# Patient Record
Sex: FEMALE | Race: WHITE | Employment: OTHER | URBAN - NONMETROPOLITAN AREA
[De-identification: names, ages, dates, MRNs, and addresses within clinical notes are randomized per-mention and may not be internally consistent; named-entity substitution may affect disease eponyms.]

---

## 2017-01-10 ENCOUNTER — APPOINTMENT (OUTPATIENT)
Dept: LAB | Age: 62
End: 2017-01-10
Attending: INTERNAL MEDICINE
Payer: COMMERCIAL

## 2017-01-10 DIAGNOSIS — I10 ESSENTIAL HYPERTENSION WITH GOAL BLOOD PRESSURE LESS THAN 140/90: ICD-10-CM

## 2017-01-10 DIAGNOSIS — E11.9 TYPE 2 DIABETES MELLITUS WITHOUT COMPLICATION, WITHOUT LONG-TERM CURRENT USE OF INSULIN (HCC): ICD-10-CM

## 2017-01-10 LAB
CREAT UR-SCNC: 206 MG/DL
MICROALBUMIN UR-MCNC: 7.98 MG/DL
MICROALBUMIN/CREAT 24H UR-RTO: 38.7 UG/MG (ref ?–30)

## 2017-01-10 PROCEDURE — 36415 COLL VENOUS BLD VENIPUNCTURE: CPT | Performed by: INTERNAL MEDICINE

## 2017-01-10 PROCEDURE — 83036 HEMOGLOBIN GLYCOSYLATED A1C: CPT | Performed by: INTERNAL MEDICINE

## 2017-01-10 PROCEDURE — 80053 COMPREHEN METABOLIC PANEL: CPT | Performed by: INTERNAL MEDICINE

## 2017-01-10 PROCEDURE — 82570 ASSAY OF URINE CREATININE: CPT

## 2017-01-10 PROCEDURE — 85025 COMPLETE CBC W/AUTO DIFF WBC: CPT | Performed by: INTERNAL MEDICINE

## 2017-01-10 PROCEDURE — 80061 LIPID PANEL: CPT | Performed by: INTERNAL MEDICINE

## 2017-01-10 PROCEDURE — 82043 UR ALBUMIN QUANTITATIVE: CPT

## 2017-01-11 ENCOUNTER — TELEPHONE (OUTPATIENT)
Dept: FAMILY MEDICINE CLINIC | Facility: CLINIC | Age: 62
End: 2017-01-11

## 2017-01-17 ENCOUNTER — OFFICE VISIT (OUTPATIENT)
Dept: FAMILY MEDICINE CLINIC | Facility: CLINIC | Age: 62
End: 2017-01-17

## 2017-01-17 VITALS
DIASTOLIC BLOOD PRESSURE: 70 MMHG | SYSTOLIC BLOOD PRESSURE: 120 MMHG | WEIGHT: 232 LBS | HEIGHT: 61 IN | BODY MASS INDEX: 43.8 KG/M2 | TEMPERATURE: 98 F | RESPIRATION RATE: 16 BRPM

## 2017-01-17 DIAGNOSIS — E11.9 TYPE 2 DIABETES MELLITUS WITHOUT COMPLICATION, WITHOUT LONG-TERM CURRENT USE OF INSULIN (HCC): Primary | ICD-10-CM

## 2017-01-17 DIAGNOSIS — I10 ESSENTIAL HYPERTENSION WITH GOAL BLOOD PRESSURE LESS THAN 140/90: ICD-10-CM

## 2017-01-17 DIAGNOSIS — E78.00 HYPERCHOLESTEREMIA: ICD-10-CM

## 2017-01-17 PROCEDURE — 99214 OFFICE O/P EST MOD 30 MIN: CPT | Performed by: INTERNAL MEDICINE

## 2017-01-17 RX ORDER — SIMVASTATIN 20 MG
20 TABLET ORAL NIGHTLY
Qty: 90 TABLET | Refills: 3 | Status: SHIPPED | OUTPATIENT
Start: 2017-01-17 | End: 2017-02-10

## 2017-01-17 RX ORDER — LISINOPRIL 20 MG/1
20 TABLET ORAL DAILY
Qty: 90 TABLET | Refills: 3 | Status: SHIPPED | OUTPATIENT
Start: 2017-01-17 | End: 2018-01-19

## 2017-01-17 RX ORDER — METFORMIN HYDROCHLORIDE 500 MG/1
500 TABLET, EXTENDED RELEASE ORAL DAILY
Qty: 90 TABLET | Refills: 3 | Status: SHIPPED | OUTPATIENT
Start: 2017-01-17 | End: 2017-02-10

## 2017-01-17 RX ORDER — BISOPROLOL FUMARATE AND HYDROCHLOROTHIAZIDE 6.25; 1 MG/1; MG/1
2 TABLET ORAL DAILY
Qty: 180 TABLET | Refills: 3 | Status: SHIPPED | OUTPATIENT
Start: 2017-01-17 | End: 2018-01-19

## 2017-01-17 NOTE — PROGRESS NOTES
HPI:   Laura Jane is a 64year old female who presents for recheck of her diabetes. Patient’s FBS have been 140. Last visit with ophthalmologist OVERDUE. Pt has been checking her feet on a regular basis. Pt denies any tingling of the feet.  Pt de CHOLECYSTECTOMY        Social History:   Smoking Status: Never Smoker                      Alcohol Use: Yes           0.0 oz/week       0 Standard drinks or equivalent per week       Comment: occ    Exercise: none.   Diet: doesn't watch     REVIEW OF SYS

## 2017-02-10 ENCOUNTER — TELEPHONE (OUTPATIENT)
Dept: FAMILY MEDICINE CLINIC | Facility: CLINIC | Age: 62
End: 2017-02-10

## 2017-02-10 DIAGNOSIS — I10 ESSENTIAL HYPERTENSION WITH GOAL BLOOD PRESSURE LESS THAN 140/90: ICD-10-CM

## 2017-02-10 DIAGNOSIS — E78.00 HYPERCHOLESTEREMIA: ICD-10-CM

## 2017-02-10 DIAGNOSIS — E11.9 TYPE 2 DIABETES MELLITUS WITHOUT COMPLICATION, WITHOUT LONG-TERM CURRENT USE OF INSULIN (HCC): Primary | ICD-10-CM

## 2017-02-10 RX ORDER — METFORMIN HYDROCHLORIDE 500 MG/1
500 TABLET, EXTENDED RELEASE ORAL DAILY
Qty: 90 TABLET | Refills: 3 | Status: SHIPPED | OUTPATIENT
Start: 2017-02-10 | End: 2017-04-13

## 2017-02-10 RX ORDER — SIMVASTATIN 20 MG
20 TABLET ORAL NIGHTLY
Qty: 90 TABLET | Refills: 3 | Status: SHIPPED | OUTPATIENT
Start: 2017-02-10 | End: 2017-02-10

## 2017-02-10 RX ORDER — METFORMIN HYDROCHLORIDE 500 MG/1
500 TABLET, EXTENDED RELEASE ORAL DAILY
Qty: 90 TABLET | Refills: 3 | Status: SHIPPED | OUTPATIENT
Start: 2017-02-10 | End: 2017-02-10

## 2017-02-10 RX ORDER — SIMVASTATIN 20 MG
20 TABLET ORAL NIGHTLY
Qty: 90 TABLET | Refills: 3 | Status: SHIPPED | OUTPATIENT
Start: 2017-02-10 | End: 2017-07-27

## 2017-02-17 ENCOUNTER — PATIENT OUTREACH (OUTPATIENT)
Dept: FAMILY MEDICINE CLINIC | Facility: CLINIC | Age: 62
End: 2017-02-17

## 2017-04-11 ENCOUNTER — APPOINTMENT (OUTPATIENT)
Dept: LAB | Age: 62
End: 2017-04-11
Attending: INTERNAL MEDICINE
Payer: COMMERCIAL

## 2017-04-11 DIAGNOSIS — E11.9 TYPE 2 DIABETES MELLITUS WITHOUT COMPLICATION, WITHOUT LONG-TERM CURRENT USE OF INSULIN (HCC): ICD-10-CM

## 2017-04-11 PROCEDURE — 80053 COMPREHEN METABOLIC PANEL: CPT | Performed by: INTERNAL MEDICINE

## 2017-04-11 PROCEDURE — 36415 COLL VENOUS BLD VENIPUNCTURE: CPT | Performed by: INTERNAL MEDICINE

## 2017-04-11 PROCEDURE — 83036 HEMOGLOBIN GLYCOSYLATED A1C: CPT | Performed by: INTERNAL MEDICINE

## 2017-04-13 ENCOUNTER — OFFICE VISIT (OUTPATIENT)
Dept: FAMILY MEDICINE CLINIC | Facility: CLINIC | Age: 62
End: 2017-04-13

## 2017-04-13 ENCOUNTER — TELEPHONE (OUTPATIENT)
Dept: FAMILY MEDICINE CLINIC | Facility: CLINIC | Age: 62
End: 2017-04-13

## 2017-04-13 VITALS
BODY MASS INDEX: 43 KG/M2 | WEIGHT: 230 LBS | TEMPERATURE: 98 F | SYSTOLIC BLOOD PRESSURE: 124 MMHG | DIASTOLIC BLOOD PRESSURE: 74 MMHG | OXYGEN SATURATION: 96 % | HEART RATE: 82 BPM

## 2017-04-13 DIAGNOSIS — I10 ESSENTIAL HYPERTENSION: ICD-10-CM

## 2017-04-13 DIAGNOSIS — E11.9 TYPE 2 DIABETES MELLITUS WITHOUT COMPLICATION, WITHOUT LONG-TERM CURRENT USE OF INSULIN (HCC): Primary | ICD-10-CM

## 2017-04-13 DIAGNOSIS — E66.01 MORBID OBESITY WITH BMI OF 40.0-44.9, ADULT (HCC): ICD-10-CM

## 2017-04-13 DIAGNOSIS — E78.00 HYPERCHOLESTEREMIA: ICD-10-CM

## 2017-04-13 PROCEDURE — 99214 OFFICE O/P EST MOD 30 MIN: CPT | Performed by: INTERNAL MEDICINE

## 2017-04-13 RX ORDER — METFORMIN HYDROCHLORIDE 750 MG/1
750 TABLET, EXTENDED RELEASE ORAL DAILY
Qty: 90 TABLET | Refills: 3 | Status: SHIPPED | OUTPATIENT
Start: 2017-04-13 | End: 2017-04-13

## 2017-04-13 RX ORDER — METFORMIN HYDROCHLORIDE 750 MG/1
750 TABLET, EXTENDED RELEASE ORAL DAILY
Qty: 90 TABLET | Refills: 3 | Status: SHIPPED | OUTPATIENT
Start: 2017-04-13 | End: 2018-01-19

## 2017-04-15 NOTE — PROGRESS NOTES
HPI:   Trung Fall is a 64year old female who presents for recheck of her diabetes. Patient’s FBS have been 150's. Last visit with ophthalmologist was last year. Pt has been checking her feet on a regular basis.  Pt denies any tingling of the fee Unspecified essential hypertension    • Other and unspecified hyperlipidemia           Past Surgical History    CHOLECYSTECTOMY        Social History:   Smoking Status: Never Smoker                      Alcohol Use: Yes           0.0 oz/week       0 Standa

## 2017-04-21 ENCOUNTER — MED REC SCAN ONLY (OUTPATIENT)
Dept: FAMILY MEDICINE CLINIC | Facility: CLINIC | Age: 62
End: 2017-04-21

## 2017-07-26 ENCOUNTER — APPOINTMENT (OUTPATIENT)
Dept: LAB | Age: 62
End: 2017-07-26
Attending: INTERNAL MEDICINE
Payer: COMMERCIAL

## 2017-07-26 DIAGNOSIS — M79.10 MYALGIA: ICD-10-CM

## 2017-07-26 DIAGNOSIS — E11.9 TYPE 2 DIABETES MELLITUS WITHOUT COMPLICATION, WITHOUT LONG-TERM CURRENT USE OF INSULIN (HCC): Primary | ICD-10-CM

## 2017-07-26 DIAGNOSIS — E11.9 TYPE 2 DIABETES MELLITUS WITHOUT COMPLICATION, WITHOUT LONG-TERM CURRENT USE OF INSULIN (HCC): ICD-10-CM

## 2017-07-26 LAB
ALBUMIN SERPL-MCNC: 3.7 G/DL (ref 3.5–4.8)
ALP LIVER SERPL-CCNC: 60 U/L (ref 50–130)
ALT SERPL-CCNC: 36 U/L (ref 14–54)
AST SERPL-CCNC: 19 U/L (ref 15–41)
BILIRUB SERPL-MCNC: 0.7 MG/DL (ref 0.1–2)
BUN BLD-MCNC: 18 MG/DL (ref 8–20)
CALCIUM BLD-MCNC: 9.6 MG/DL (ref 8.3–10.3)
CHLORIDE: 101 MMOL/L (ref 101–111)
CO2: 28 MMOL/L (ref 22–32)
CREAT BLD-MCNC: 0.82 MG/DL (ref 0.55–1.02)
EST. AVERAGE GLUCOSE BLD GHB EST-MCNC: 163 MG/DL (ref 68–126)
GLUCOSE BLD-MCNC: 193 MG/DL (ref 70–99)
HBA1C MFR BLD HPLC: 7.3 % (ref ?–5.7)
M PROTEIN MFR SERPL ELPH: 7.2 G/DL (ref 6.1–8.3)
POTASSIUM SERPL-SCNC: 4.4 MMOL/L (ref 3.6–5.1)
SODIUM SERPL-SCNC: 139 MMOL/L (ref 136–144)

## 2017-07-26 PROCEDURE — 80053 COMPREHEN METABOLIC PANEL: CPT | Performed by: INTERNAL MEDICINE

## 2017-07-26 PROCEDURE — 83036 HEMOGLOBIN GLYCOSYLATED A1C: CPT | Performed by: INTERNAL MEDICINE

## 2017-07-26 PROCEDURE — 80061 LIPID PANEL: CPT | Performed by: INTERNAL MEDICINE

## 2017-07-26 PROCEDURE — 36415 COLL VENOUS BLD VENIPUNCTURE: CPT | Performed by: INTERNAL MEDICINE

## 2017-07-27 ENCOUNTER — OFFICE VISIT (OUTPATIENT)
Dept: FAMILY MEDICINE CLINIC | Facility: CLINIC | Age: 62
End: 2017-07-27

## 2017-07-27 VITALS
TEMPERATURE: 98 F | WEIGHT: 231 LBS | DIASTOLIC BLOOD PRESSURE: 70 MMHG | SYSTOLIC BLOOD PRESSURE: 122 MMHG | RESPIRATION RATE: 18 BRPM | HEIGHT: 61 IN | HEART RATE: 90 BPM | BODY MASS INDEX: 43.61 KG/M2

## 2017-07-27 DIAGNOSIS — E78.00 HYPERCHOLESTEREMIA: ICD-10-CM

## 2017-07-27 DIAGNOSIS — M79.10 MYALGIA: Primary | ICD-10-CM

## 2017-07-27 DIAGNOSIS — E11.9 TYPE 2 DIABETES MELLITUS WITHOUT COMPLICATION, WITHOUT LONG-TERM CURRENT USE OF INSULIN (HCC): ICD-10-CM

## 2017-07-27 LAB
CHOLEST SMN-MCNC: 234 MG/DL (ref ?–200)
HDLC SERPL-MCNC: 61 MG/DL (ref 45–?)
HDLC SERPL: 3.84 {RATIO} (ref ?–4.44)
LDLC SERPL CALC-MCNC: 132 MG/DL (ref ?–130)
LDLC SERPL-MCNC: 41 MG/DL (ref 5–40)
NONHDLC SERPL-MCNC: 173 MG/DL (ref ?–130)
TRIGLYCERIDES: 203 MG/DL (ref ?–150)

## 2017-07-27 PROCEDURE — 99214 OFFICE O/P EST MOD 30 MIN: CPT | Performed by: INTERNAL MEDICINE

## 2017-07-27 NOTE — PROGRESS NOTES
Love Jacobs is a 58year old female. HPI:   Patient presents for recheck of her hypertension.  Pt has been taking medications as instructed, no medication side effects, home BP monitoring in the range of 233-391'P systolic and 27-53'G diastolic Smokeless tobacco: Never Used                      Alcohol use: Yes           0.0 oz/week     Comment: occ        REVIEW OF SYSTEMS:   GENERAL HEALTH: feels well otherwise  SKIN: denies any unusual skin lesions or rashes  RESPIRATORY: denies shortness

## 2018-01-16 ENCOUNTER — LABORATORY ENCOUNTER (OUTPATIENT)
Dept: LAB | Age: 63
End: 2018-01-16
Attending: INTERNAL MEDICINE
Payer: COMMERCIAL

## 2018-01-16 DIAGNOSIS — M79.10 MYALGIA: ICD-10-CM

## 2018-01-16 DIAGNOSIS — E11.9 TYPE 2 DIABETES MELLITUS WITHOUT COMPLICATIONS (HCC): ICD-10-CM

## 2018-01-16 DIAGNOSIS — I10 ESSENTIAL HYPERTENSION: Primary | ICD-10-CM

## 2018-01-16 LAB
ALBUMIN SERPL-MCNC: 3.6 G/DL (ref 3.5–4.8)
ALP LIVER SERPL-CCNC: 62 U/L (ref 50–130)
ALT SERPL-CCNC: 47 U/L (ref 14–54)
AST SERPL-CCNC: 30 U/L (ref 15–41)
BILIRUB SERPL-MCNC: 0.7 MG/DL (ref 0.1–2)
BUN BLD-MCNC: 16 MG/DL (ref 8–20)
CALCIUM BLD-MCNC: 9.5 MG/DL (ref 8.3–10.3)
CHLORIDE: 98 MMOL/L (ref 101–111)
CHOLEST SMN-MCNC: 276 MG/DL (ref ?–200)
CO2: 29 MMOL/L (ref 22–32)
CREAT BLD-MCNC: 0.82 MG/DL (ref 0.55–1.02)
EST. AVERAGE GLUCOSE BLD GHB EST-MCNC: 171 MG/DL (ref 68–126)
GLUCOSE BLD-MCNC: 195 MG/DL (ref 70–99)
HBA1C MFR BLD HPLC: 7.6 % (ref ?–5.7)
HDLC SERPL-MCNC: 64 MG/DL (ref 45–?)
HDLC SERPL: 4.31 {RATIO} (ref ?–4.44)
LDLC SERPL CALC-MCNC: 158 MG/DL (ref ?–130)
M PROTEIN MFR SERPL ELPH: 7.3 G/DL (ref 6.1–8.3)
NONHDLC SERPL-MCNC: 212 MG/DL (ref ?–130)
POTASSIUM SERPL-SCNC: 4.6 MMOL/L (ref 3.6–5.1)
SODIUM SERPL-SCNC: 137 MMOL/L (ref 136–144)
TRIGL SERPL-MCNC: 270 MG/DL (ref ?–150)
TSI SER-ACNC: 4.87 MIU/ML (ref 0.35–5.5)
VLDLC SERPL CALC-MCNC: 54 MG/DL (ref 5–40)

## 2018-01-16 PROCEDURE — 80053 COMPREHEN METABOLIC PANEL: CPT | Performed by: INTERNAL MEDICINE

## 2018-01-16 PROCEDURE — 83036 HEMOGLOBIN GLYCOSYLATED A1C: CPT | Performed by: INTERNAL MEDICINE

## 2018-01-16 PROCEDURE — 80061 LIPID PANEL: CPT | Performed by: INTERNAL MEDICINE

## 2018-01-16 PROCEDURE — 84443 ASSAY THYROID STIM HORMONE: CPT | Performed by: INTERNAL MEDICINE

## 2018-01-16 PROCEDURE — 36415 COLL VENOUS BLD VENIPUNCTURE: CPT | Performed by: INTERNAL MEDICINE

## 2018-01-18 ENCOUNTER — OFFICE VISIT (OUTPATIENT)
Dept: FAMILY MEDICINE CLINIC | Facility: CLINIC | Age: 63
End: 2018-01-18

## 2018-01-18 VITALS
BODY MASS INDEX: 43.23 KG/M2 | HEART RATE: 73 BPM | OXYGEN SATURATION: 95 % | HEIGHT: 61 IN | SYSTOLIC BLOOD PRESSURE: 140 MMHG | TEMPERATURE: 98 F | WEIGHT: 229 LBS | DIASTOLIC BLOOD PRESSURE: 90 MMHG

## 2018-01-18 DIAGNOSIS — E11.9 TYPE 2 DIABETES MELLITUS WITHOUT COMPLICATION, WITHOUT LONG-TERM CURRENT USE OF INSULIN (HCC): ICD-10-CM

## 2018-01-18 DIAGNOSIS — Z00.00 WELL ADULT EXAM: Primary | ICD-10-CM

## 2018-01-18 PROCEDURE — 99396 PREV VISIT EST AGE 40-64: CPT | Performed by: INTERNAL MEDICINE

## 2018-01-18 NOTE — PROGRESS NOTES
HPI:   Carmen Yanez is a 58year old female who presents for a complete physical exam. Symptoms: denies discharge, itching, burning or dysuria, is menopausal. Patient complains of nothing.        Immunization History  Administered            Date(s) other[other] [OTHER] Father      AAA      Social History:   Smoking status: Never Smoker                                                              Smokeless tobacco: Never Used                      Alcohol use: Yes           0.0 oz/week     Comment: occ oil.  Pt referred for screening colonoscopy. Pt info handouts given for: exercise, low fat diet and breast self-exam. Pt' s weight is Body mass index is 43.27 kg/m². , recommended low fat diet and aerobic exercise 30 minutes three times weekly.   The patient

## 2018-01-19 ENCOUNTER — TELEPHONE (OUTPATIENT)
Dept: FAMILY MEDICINE CLINIC | Facility: CLINIC | Age: 63
End: 2018-01-19

## 2018-01-19 DIAGNOSIS — I10 ESSENTIAL HYPERTENSION WITH GOAL BLOOD PRESSURE LESS THAN 140/90: ICD-10-CM

## 2018-01-19 DIAGNOSIS — E78.00 HYPERCHOLESTEREMIA: ICD-10-CM

## 2018-01-19 DIAGNOSIS — E11.9 TYPE 2 DIABETES MELLITUS WITHOUT COMPLICATION, WITHOUT LONG-TERM CURRENT USE OF INSULIN (HCC): ICD-10-CM

## 2018-01-19 RX ORDER — LISINOPRIL 20 MG/1
20 TABLET ORAL DAILY
Qty: 90 TABLET | Refills: 0 | Status: SHIPPED | OUTPATIENT
Start: 2018-01-19 | End: 2018-04-16

## 2018-01-19 RX ORDER — METFORMIN HYDROCHLORIDE 750 MG/1
750 TABLET, EXTENDED RELEASE ORAL DAILY
Qty: 90 TABLET | Refills: 0 | Status: SHIPPED | OUTPATIENT
Start: 2018-01-19 | End: 2018-04-16

## 2018-01-19 RX ORDER — BISOPROLOL FUMARATE AND HYDROCHLOROTHIAZIDE 6.25; 1 MG/1; MG/1
2 TABLET ORAL DAILY
Qty: 180 TABLET | Refills: 0 | Status: SHIPPED | OUTPATIENT
Start: 2018-01-19 | End: 2018-04-16

## 2018-01-19 NOTE — TELEPHONE ENCOUNTER
bisoprolol-hydrochlorothiazide 10-6.25 MG Oral Tab   lisinopril 20 MG Oral Tab   MetFORMIN HCl  MG Oral Tablet 24 Hr   PLEASE SEND REFILL TO [facility] IN [place], DR ARAMBULA TOLD HER SHE WOULD SEND THEM IN YESTERDAY AND THE PHARMACY NEVER GOT THEM.   SHE N

## 2018-04-16 DIAGNOSIS — I10 ESSENTIAL HYPERTENSION WITH GOAL BLOOD PRESSURE LESS THAN 140/90: ICD-10-CM

## 2018-04-16 DIAGNOSIS — E11.9 TYPE 2 DIABETES MELLITUS WITHOUT COMPLICATION, WITHOUT LONG-TERM CURRENT USE OF INSULIN (HCC): ICD-10-CM

## 2018-04-16 DIAGNOSIS — E78.00 HYPERCHOLESTEREMIA: ICD-10-CM

## 2018-04-16 RX ORDER — BISOPROLOL FUMARATE AND HYDROCHLOROTHIAZIDE 6.25; 1 MG/1; MG/1
TABLET ORAL
Qty: 180 TABLET | Refills: 0 | Status: SHIPPED | OUTPATIENT
Start: 2018-04-16 | End: 2018-07-18

## 2018-04-16 RX ORDER — LISINOPRIL 20 MG/1
TABLET ORAL
Qty: 90 TABLET | Refills: 0 | Status: SHIPPED | OUTPATIENT
Start: 2018-04-16 | End: 2018-07-18

## 2018-04-16 RX ORDER — METFORMIN HYDROCHLORIDE 750 MG/1
TABLET, EXTENDED RELEASE ORAL
Qty: 90 TABLET | Refills: 0 | Status: SHIPPED | OUTPATIENT
Start: 2018-04-16 | End: 2018-07-10

## 2018-04-16 NOTE — TELEPHONE ENCOUNTER
Last office visit:  1/18/2018  Last A1C 1/16/2018:  7.6  Last CMP: 1/16/2018  Last B/P 1/18/2018:  140/90    Last refill for Metformin HCL ER: 1/19/2018  Last refill for Bisoprolol-Hydrochlorothiazide:  1/19/2018  Last refill for Lisinopril:  1/19/2018

## 2018-07-10 ENCOUNTER — LABORATORY ENCOUNTER (OUTPATIENT)
Dept: LAB | Age: 63
End: 2018-07-10
Attending: INTERNAL MEDICINE
Payer: COMMERCIAL

## 2018-07-10 DIAGNOSIS — E11.9 DIABETES MELLITUS (HCC): ICD-10-CM

## 2018-07-10 DIAGNOSIS — M79.10 MYALGIA: Primary | ICD-10-CM

## 2018-07-10 DIAGNOSIS — I10 ESSENTIAL HYPERTENSION: ICD-10-CM

## 2018-07-10 LAB
ALBUMIN SERPL-MCNC: 3.4 G/DL (ref 3.5–4.8)
ALP LIVER SERPL-CCNC: 55 U/L (ref 50–130)
ALT SERPL-CCNC: 35 U/L (ref 14–54)
AST SERPL-CCNC: 18 U/L (ref 15–41)
BILIRUB SERPL-MCNC: 0.5 MG/DL (ref 0.1–2)
BUN BLD-MCNC: 15 MG/DL (ref 8–20)
CALCIUM BLD-MCNC: 9.3 MG/DL (ref 8.3–10.3)
CHLORIDE: 102 MMOL/L (ref 101–111)
CHOLEST SMN-MCNC: 247 MG/DL (ref ?–200)
CO2: 28 MMOL/L (ref 22–32)
CREAT BLD-MCNC: 0.81 MG/DL (ref 0.55–1.02)
EST. AVERAGE GLUCOSE BLD GHB EST-MCNC: 171 MG/DL (ref 68–126)
GLUCOSE BLD-MCNC: 182 MG/DL (ref 70–99)
HBA1C MFR BLD HPLC: 7.6 % (ref ?–5.7)
HDLC SERPL-MCNC: 54 MG/DL (ref 45–?)
HDLC SERPL: 4.57 {RATIO} (ref ?–4.44)
LDLC SERPL CALC-MCNC: 143 MG/DL (ref ?–130)
M PROTEIN MFR SERPL ELPH: 7.1 G/DL (ref 6.1–8.3)
NONHDLC SERPL-MCNC: 193 MG/DL (ref ?–130)
POTASSIUM SERPL-SCNC: 4.1 MMOL/L (ref 3.6–5.1)
SODIUM SERPL-SCNC: 141 MMOL/L (ref 136–144)
TRIGL SERPL-MCNC: 252 MG/DL (ref ?–150)
VLDLC SERPL CALC-MCNC: 50 MG/DL (ref 5–40)

## 2018-07-10 PROCEDURE — 83036 HEMOGLOBIN GLYCOSYLATED A1C: CPT | Performed by: INTERNAL MEDICINE

## 2018-07-10 PROCEDURE — 36415 COLL VENOUS BLD VENIPUNCTURE: CPT | Performed by: INTERNAL MEDICINE

## 2018-07-10 PROCEDURE — 80053 COMPREHEN METABOLIC PANEL: CPT | Performed by: INTERNAL MEDICINE

## 2018-07-10 PROCEDURE — 80061 LIPID PANEL: CPT | Performed by: INTERNAL MEDICINE

## 2018-07-11 ENCOUNTER — OFFICE VISIT (OUTPATIENT)
Dept: FAMILY MEDICINE CLINIC | Facility: CLINIC | Age: 63
End: 2018-07-11

## 2018-07-11 VITALS
OXYGEN SATURATION: 94 % | TEMPERATURE: 98 F | DIASTOLIC BLOOD PRESSURE: 90 MMHG | BODY MASS INDEX: 43.47 KG/M2 | SYSTOLIC BLOOD PRESSURE: 140 MMHG | WEIGHT: 230.25 LBS | HEART RATE: 70 BPM | HEIGHT: 61 IN

## 2018-07-11 DIAGNOSIS — G47.33 OSA (OBSTRUCTIVE SLEEP APNEA): ICD-10-CM

## 2018-07-11 DIAGNOSIS — E11.9 TYPE 2 DIABETES MELLITUS WITHOUT COMPLICATION, WITHOUT LONG-TERM CURRENT USE OF INSULIN (HCC): ICD-10-CM

## 2018-07-11 DIAGNOSIS — Z01.810 PREOP CARDIOVASCULAR EXAM: Primary | ICD-10-CM

## 2018-07-11 PROCEDURE — 93000 ELECTROCARDIOGRAM COMPLETE: CPT | Performed by: INTERNAL MEDICINE

## 2018-07-11 PROCEDURE — 99244 OFF/OP CNSLTJ NEW/EST MOD 40: CPT | Performed by: INTERNAL MEDICINE

## 2018-07-11 NOTE — PROGRESS NOTES
Ina Villarreal is a 61year old female who presents for a pre-operative physical exam. Patient is to have cataract surgery, to be done by Dr. Karon Szymanski at [address] [place] [address] on 8/1 and 8/15. HPI:   Pt complains of poor vision at night.    She has well co chest pain on exertion  GI: denies abdominal pain,denies heartburn  : denies dysuria, vaginal discharge or itching,periods regular denies nocturia or changes in stream  MUSCULOSKELETAL: denies back pain  NEURO: denies headaches  PSYCHE: denies depression

## 2018-07-13 ENCOUNTER — TELEPHONE (OUTPATIENT)
Dept: FAMILY MEDICINE CLINIC | Facility: CLINIC | Age: 63
End: 2018-07-13

## 2018-07-13 NOTE — TELEPHONE ENCOUNTER
Pt called stating she received a call from our office about her sleep study. There is no documentation stating our office called. A referral was placed from Dr. Yola Duffy for sleep study with Dr. Darby Rodrigez at [facility].  Pt was provided with Dr. Anthony Fisher of

## 2018-07-18 DIAGNOSIS — I10 ESSENTIAL HYPERTENSION WITH GOAL BLOOD PRESSURE LESS THAN 140/90: ICD-10-CM

## 2018-07-18 DIAGNOSIS — E11.9 TYPE 2 DIABETES MELLITUS WITHOUT COMPLICATION, WITHOUT LONG-TERM CURRENT USE OF INSULIN (HCC): ICD-10-CM

## 2018-07-18 DIAGNOSIS — E78.00 HYPERCHOLESTEREMIA: ICD-10-CM

## 2018-07-18 RX ORDER — LISINOPRIL 20 MG/1
20 TABLET ORAL DAILY
Qty: 90 TABLET | Refills: 0 | Status: SHIPPED | OUTPATIENT
Start: 2018-07-18 | End: 2018-10-14

## 2018-07-18 RX ORDER — BISOPROLOL FUMARATE AND HYDROCHLOROTHIAZIDE 6.25; 1 MG/1; MG/1
2 TABLET ORAL DAILY
Qty: 180 TABLET | Refills: 0
Start: 2018-07-18

## 2018-07-18 RX ORDER — BISOPROLOL FUMARATE AND HYDROCHLOROTHIAZIDE 6.25; 1 MG/1; MG/1
2 TABLET ORAL DAILY
Qty: 180 TABLET | Refills: 0 | Status: SHIPPED | OUTPATIENT
Start: 2018-07-18 | End: 2018-10-14

## 2018-07-18 NOTE — TELEPHONE ENCOUNTER
From: Valentino Davis  Sent: 7/18/2018 9:43 AM CDT  Subject: Medication Renewal Request    Glendale Ryan would like a refill of the following medications:     bisoprolol-hydrochlorothiazide 10-6.25 MG Oral Tab Lizet Foster MD]    Preferred ph

## 2018-07-18 NOTE — TELEPHONE ENCOUNTER
From: Aubrey Benitez  Sent: 7/18/2018 9:05 AM CDT  Subject: Medication Renewal Request    Aubrey Benitez would like a refill of the following medications:     BISOPROLOL-HYDROCHLOROTHIAZIDE 10-6.25 MG Oral Tab Robby Chadwick MD]    Preferred ph

## 2018-07-18 NOTE — TELEPHONE ENCOUNTER
From: Ricarda Krabbe  Sent: 7/18/2018 9:09 AM CDT  Subject: Medication Renewal Request    Ricarda Krabbe would like a refill of the following medications:     LISINOPRIL 20 MG Oral Tab Andie Gabriel MD]    Preferred pharmacy: [facility]

## 2018-07-19 ENCOUNTER — OFFICE VISIT (OUTPATIENT)
Dept: FAMILY MEDICINE CLINIC | Facility: CLINIC | Age: 63
End: 2018-07-19
Payer: COMMERCIAL

## 2018-07-19 VITALS
RESPIRATION RATE: 20 BRPM | HEART RATE: 72 BPM | DIASTOLIC BLOOD PRESSURE: 74 MMHG | BODY MASS INDEX: 43.57 KG/M2 | WEIGHT: 227.81 LBS | SYSTOLIC BLOOD PRESSURE: 126 MMHG | TEMPERATURE: 99 F | HEIGHT: 60.75 IN

## 2018-07-19 DIAGNOSIS — G47.10 HYPERSOMNIA: ICD-10-CM

## 2018-07-19 DIAGNOSIS — R06.83 SNORING: Primary | ICD-10-CM

## 2018-07-19 PROCEDURE — 99214 OFFICE O/P EST MOD 30 MIN: CPT | Performed by: FAMILY MEDICINE

## 2018-07-19 RX ORDER — BISOPROLOL FUMARATE AND HYDROCHLOROTHIAZIDE 6.25; 1 MG/1; MG/1
1 TABLET ORAL DAILY
Refills: 0 | COMMUNITY
Start: 2018-07-18 | End: 2019-11-05

## 2018-07-19 RX ORDER — LISINOPRIL 20 MG/1
20 TABLET ORAL DAILY
Refills: 0 | COMMUNITY
Start: 2018-07-18 | End: 2019-11-05

## 2018-07-19 RX ORDER — METFORMIN HYDROCHLORIDE 750 MG/1
750 TABLET, EXTENDED RELEASE ORAL
Refills: 0 | COMMUNITY
Start: 2018-07-11 | End: 2018-10-18

## 2018-07-19 RX ORDER — LATANOPROST 50 UG/ML
1 SOLUTION/ DROPS OPHTHALMIC NIGHTLY
COMMUNITY

## 2018-07-19 NOTE — PROGRESS NOTES
[facility]  SLEEP PROGRESS NOTE        HPI:   This is a 61year old female coming in for Patient presents with:  Consult: Sleep Consult      HPI:   Pt states that she is here because she notices that she doesn't feel rested when she wake be heard through closed doors)? 1   Do you often feel tired, fatigued, or sleepy during the day? 1   Has anyone observed you stop breathing during your sleep? 1   Do you have or are you being treated for high blood pressure?  1   BMI more than 35kg/mg2? 1 both eyes nightly. Disp:  Rfl:    bisoprolol-hydrochlorothiazide 10-6.25 MG Oral Tab Take 2 tablets by mouth daily. Disp: 180 tablet Rfl: 0   lisinopril 20 MG Oral Tab Take 1 tablet (20 mg total) by mouth daily.  Disp: 90 tablet Rfl: 0   MetFORMIN HCl ER 75 140/90        Vital signs reviewed. Physical Exam   Constitutional: She is oriented to person, place, and time. She appears well-developed and well-nourished. No distress. HENT:   Head: Normocephalic and atraumatic.    Right Ear: External ear normal.   L are no Patient Instructions on file for this visit. Advised if still with sleep apnea and not using CPAP has a  7 fold increase in risk of heart attack, stroke, abnormal heart rhythm  and death,  increased risk of driving accidents.    Advised to

## 2018-07-25 ENCOUNTER — OFFICE VISIT (OUTPATIENT)
Dept: SLEEP CENTER | Facility: HOSPITAL | Age: 63
End: 2018-07-25
Attending: FAMILY MEDICINE
Payer: COMMERCIAL

## 2018-07-25 DIAGNOSIS — G47.10 HYPERSOMNIA: ICD-10-CM

## 2018-07-25 DIAGNOSIS — R06.83 SNORING: ICD-10-CM

## 2018-07-25 PROCEDURE — 95806 SLEEP STUDY UNATT&RESP EFFT: CPT

## 2018-07-30 NOTE — PROCEDURES
[address] [address],[facility] [address]       Accredited by the [facility] of Sleep Medicine (AASM)    PATIENT'S NAME:        Paddy Roberts  ATTENDING PHYSICIAN:   Luiz Bhandari. MD Migel  REFERRING PHYSICIAN:   Luiz Bhandari.  Leeroy Mejia MD severity of sleep-disordered breathing, I recommend that patient undergo definitive therapy by CPAP titration in the lab. 2.   The patient should avoid alcohol and sedative medications as they may worsen her obstructive sleep apnea.     3.   The patient

## 2018-07-31 ENCOUNTER — TELEPHONE (OUTPATIENT)
Dept: FAMILY MEDICINE CLINIC | Facility: CLINIC | Age: 63
End: 2018-07-31

## 2018-07-31 NOTE — TELEPHONE ENCOUNTER
----- Message from Zulema Hickman MD sent at 7/31/2018  8:37 AM CDT -----  Severe sleep apnea. Please update flow sheet and assure patient is scheduled for follow up. Alternatively patient can be scheduled for cpap titration study if she desires.

## 2018-07-31 NOTE — TELEPHONE ENCOUNTER
Flowsheet updated      Your appointments     Date & Time Appointment Department [facility])    Aug 09, 2018 11:00 AM CDT Sleep Follow Up with Nury Butt MD [address] Maty Guevara ([address])        Agnes Franco

## 2018-08-09 ENCOUNTER — OFFICE VISIT (OUTPATIENT)
Dept: FAMILY MEDICINE CLINIC | Facility: CLINIC | Age: 63
End: 2018-08-09
Payer: COMMERCIAL

## 2018-08-09 VITALS
HEIGHT: 61 IN | SYSTOLIC BLOOD PRESSURE: 126 MMHG | WEIGHT: 227.38 LBS | DIASTOLIC BLOOD PRESSURE: 80 MMHG | HEART RATE: 64 BPM | BODY MASS INDEX: 42.93 KG/M2 | RESPIRATION RATE: 20 BRPM | TEMPERATURE: 99 F

## 2018-08-09 DIAGNOSIS — G47.33 OBSTRUCTIVE SLEEP APNEA: ICD-10-CM

## 2018-08-09 DIAGNOSIS — G47.33 OSA (OBSTRUCTIVE SLEEP APNEA): Primary | ICD-10-CM

## 2018-08-09 PROCEDURE — 99214 OFFICE O/P EST MOD 30 MIN: CPT | Performed by: FAMILY MEDICINE

## 2018-08-09 RX ORDER — BROMFENAC SODIUM 0.7 MG/ML
SOLUTION/ DROPS OPHTHALMIC
Refills: 0 | COMMUNITY
Start: 2018-07-26 | End: 2018-10-18 | Stop reason: ALTCHOICE

## 2018-08-09 RX ORDER — MOXIFLOXACIN 5 MG/ML
SOLUTION/ DROPS OPHTHALMIC
Refills: 0 | COMMUNITY
Start: 2018-08-07 | End: 2018-10-18 | Stop reason: ALTCHOICE

## 2018-08-09 RX ORDER — PREDNISOLONE ACETATE 10 MG/ML
SUSPENSION/ DROPS OPHTHALMIC
Refills: 0 | COMMUNITY
Start: 2018-08-08 | End: 2018-10-18 | Stop reason: ALTCHOICE

## 2018-08-09 NOTE — PROGRESS NOTES
[facility]        HPI:   This is a 61year old female coming in for Patient presents with: Follow - Up: f/up on home sleep study      HPI:  Patient is present with  and here to discuss sleep study.    Reviewed equivalent: 1 - 2 per week     Comment: occ    Family History:  Family History   Problem Relation Age of Onset   • Heart Disorder Father    • Other[other] [OTHER] Father      AD   • other[other] [OTHER] Father      AAA     Allergies:    Prednisone apnea. Cardiovascular: Negative. Gastrointestinal: Negative. Endocrine: Negative. Genitourinary: Negative. Musculoskeletal: Negative. Skin: Negative. Allergic/Immunologic: Negative. Neurological: Negative.     Hematological: Negative normal reflexes. Skin: Skin is warm and dry. She is not diaphoretic. Psychiatric: She has a normal mood and affect.  Her behavior is normal. Judgment and thought content normal.       ASSESSMENT AND PLAN:   1. SARBJIT (obstructive sleep apnea)  - OP REFERAL notified to call with any questions, complications, allergies, or worsening or changing symptoms. Parent is to call with any side effects or complications from the treatments as a result of today.        Riddhi Valdes MD  8/9/2018  3:02 PM

## 2018-08-09 NOTE — PATIENT INSTRUCTIONS
You have been scheduled for a sleep study at the [address] [place] [address]. You will receive a phone call from the Sleep lab to review forms and will receive forms to fill out in the mail.      You should have heard from our precert department within the next

## 2018-08-23 ENCOUNTER — OFFICE VISIT (OUTPATIENT)
Dept: SLEEP CENTER | Age: 63
End: 2018-08-23
Attending: FAMILY MEDICINE
Payer: COMMERCIAL

## 2018-08-23 ENCOUNTER — TELEPHONE (OUTPATIENT)
Dept: FAMILY MEDICINE CLINIC | Facility: CLINIC | Age: 63
End: 2018-08-23

## 2018-08-23 DIAGNOSIS — G47.33 OSA (OBSTRUCTIVE SLEEP APNEA): ICD-10-CM

## 2018-08-23 PROCEDURE — 95811 POLYSOM 6/>YRS CPAP 4/> PARM: CPT

## 2018-08-23 NOTE — TELEPHONE ENCOUNTER
----- Message from King De Leon sent at 8/23/2018  2:21 PM CDT -----  Regarding: No approval Necessary [id number]  Per Gregoria Jenkins at [facility] [phone] this members plan does not require Moreno Vergara REF # [id number]

## 2018-08-23 NOTE — TELEPHONE ENCOUNTER
Per Jenelle Rutledge at [facility] Referral prior auth done on patient's sleep study  Patient and her  both notified  Patient is good to go to her appointment tonight for sleep study at [facility] sleep lab    Patient and  expressed understanding    Darlene Greenberg

## 2018-08-23 NOTE — TELEPHONE ENCOUNTER
wants to know if the sleep study for tonight is going to be covered by his insurance, said she was told she was supposed to get a call to let her know but has not yet

## 2018-08-28 NOTE — PROCEDURES
[address] [address],[facility] [address]       Accredited by the [facility] of Sleep Medicine (AASM)    PATIENT'S NAME:        Chadwick Melissa  ATTENDING PHYSICIAN:   Aamir Johnson. MD Migel  REFERRING PHYSICIAN:   Aamir Johnson.  Edinson Weeks MD final pressure of 15 cm of water, the patient's AHI was 4. Her lowest oxygen saturation nini was 89.7%. Baseline oxygen saturation was 92.7%, the lowest oxygenation was 74%.     LIMB ACTIVITY:  There were 18 limb movements recorded, 8 were classified as pm for a CPAP Titration study. Patient was oriented to testing procedure and testing bedroom. Previous  sleep study was an HST performed on  07/25/2018 and showed an overall AHI of 60.5, and an SAO2 nini of  61%.   Patient Medical History: kraig, snoring, With Snoring 41 9.2 11 9.5 52 9.3   Spontaneous 8 1.8 1 0.9 9 1.6   Total 79 17.8 14 12.1 93 16.6       OXYGEN SATURATION SUMMARY     Wake REM NREM Total Record   Minimum OSat (%) 84.0% 74.0% 80.1% 74.0%   Maximum OSat (%) 97.6% 96.3% 97.8% 97.8%   [place] NREM (min) Wake (min) Apnea Index Hypop Index AHI Desat Index Sleep Eff% Min OSat   5 44.5 0.0 13.5 31.0 - 17.8 17.8 17.8 30.3% 87.8   6 21.0 0.0 18.0 3.0 - 13.3 13.3 10.0 85.7% 87.2   7 16.0 0.0 13.0 3.0 - 27.7 27.7 36.9 81.3% 87.3   8 63.5 0.0 13.5 50.0 English

## 2018-08-30 ENCOUNTER — TELEPHONE (OUTPATIENT)
Dept: FAMILY MEDICINE CLINIC | Facility: CLINIC | Age: 63
End: 2018-08-30

## 2018-08-30 DIAGNOSIS — G47.33 OSA (OBSTRUCTIVE SLEEP APNEA): Primary | ICD-10-CM

## 2018-08-30 NOTE — TELEPHONE ENCOUNTER
----- Message from Gemma Perez MD sent at 8/30/2018  1:49 PM CDT -----  Please notify patient. Have orders for CPAP at 15 sent to [facility]. cflex 3. Update flowsheet,   Follow up appointment 2 weeks after using machine.    See if we can set her up in

## 2018-09-01 NOTE — TELEPHONE ENCOUNTER
Script and all related documents faxed to [facility] for   Patient notified and expressed understanding    June Forrest, 09/01/18, 12:41 PM

## 2018-10-14 DIAGNOSIS — E78.00 HYPERCHOLESTEREMIA: ICD-10-CM

## 2018-10-14 DIAGNOSIS — E11.9 TYPE 2 DIABETES MELLITUS WITHOUT COMPLICATION, WITHOUT LONG-TERM CURRENT USE OF INSULIN (HCC): ICD-10-CM

## 2018-10-14 DIAGNOSIS — I10 ESSENTIAL HYPERTENSION WITH GOAL BLOOD PRESSURE LESS THAN 140/90: ICD-10-CM

## 2018-10-15 RX ORDER — LISINOPRIL 20 MG/1
TABLET ORAL
Qty: 90 TABLET | Refills: 0 | Status: SHIPPED | OUTPATIENT
Start: 2018-10-15 | End: 2018-10-18

## 2018-10-15 RX ORDER — BISOPROLOL FUMARATE AND HYDROCHLOROTHIAZIDE 6.25; 1 MG/1; MG/1
TABLET ORAL
Qty: 180 TABLET | Refills: 0 | Status: SHIPPED | OUTPATIENT
Start: 2018-10-15 | End: 2018-10-18

## 2018-10-18 ENCOUNTER — OFFICE VISIT (OUTPATIENT)
Dept: FAMILY MEDICINE CLINIC | Facility: CLINIC | Age: 63
End: 2018-10-18
Payer: COMMERCIAL

## 2018-10-18 VITALS
DIASTOLIC BLOOD PRESSURE: 78 MMHG | HEIGHT: 61 IN | WEIGHT: 232 LBS | OXYGEN SATURATION: 95 % | SYSTOLIC BLOOD PRESSURE: 110 MMHG | RESPIRATION RATE: 22 BRPM | BODY MASS INDEX: 43.8 KG/M2 | TEMPERATURE: 98 F | HEART RATE: 66 BPM

## 2018-10-18 DIAGNOSIS — G47.33 OBSTRUCTIVE SLEEP APNEA: Primary | ICD-10-CM

## 2018-10-18 PROCEDURE — 99214 OFFICE O/P EST MOD 30 MIN: CPT | Performed by: FAMILY MEDICINE

## 2018-10-18 NOTE — PROGRESS NOTES
[facility]        HPI:   This is a 61year old female coming in for Patient presents with:  Sleep Problem: SARBJIT follow up      HPI: Patient is overall doing well with her CPAP.   She feels much better and has more ener Past Medical History:   Diagnosis Date   • Diabetes ([facility] [address]    • Essential hypertension    • Other and unspecified hyperlipidemia    • Type II or unspecified type diabetes mellitus without mention of complication, not stated as uncontrolled     [address] Morbid obesity with BMI of 40.0-44.9, adult ([facility] [address]     Obstructive sleep apnea      REVIEW OF SYSTEMS:   Review of Systems   Constitutional: Positive for fatigue. HENT: Negative. Negative for congestion. Eyes: Negative.     Respiratory: Positive for ap motion. Neck supple. No JVD present. No tracheal deviation present. No thyromegaly present. Cardiovascular: Normal rate, regular rhythm, normal heart sounds and intact distal pulses.    Pulmonary/Chest: Effort normal.   Lymphadenopathy:     She has no cer complications, allergies, or worsening or changing symptoms. Parent is to call with any side effects or complications from the treatments as a result of today.        Dat Mullen MD  10/18/2018  1:05 PM

## 2018-11-29 ENCOUNTER — TELEPHONE (OUTPATIENT)
Dept: FAMILY MEDICINE CLINIC | Facility: CLINIC | Age: 63
End: 2018-11-29

## 2018-11-29 DIAGNOSIS — Z12.31 SCREENING MAMMOGRAM, ENCOUNTER FOR: Primary | ICD-10-CM

## 2018-11-29 NOTE — TELEPHONE ENCOUNTER
PT WOULD LIKE TO SCHEDULE HER ROMANA AT 2018 [facility]. ORDERS NEED TO BE UPDATED IN CHART.  PLEASE CALL

## 2018-12-03 ENCOUNTER — HOSPITAL ENCOUNTER (OUTPATIENT)
Dept: MAMMOGRAPHY | Age: 63
Discharge: HOME | End: 2018-12-03
Attending: INTERNAL MEDICINE
Payer: COMMERCIAL

## 2018-12-03 DIAGNOSIS — Z12.31 SCREENING MAMMOGRAM, ENCOUNTER FOR: ICD-10-CM

## 2018-12-03 PROCEDURE — 77067 SCR MAMMO BI INCL CAD: CPT | Performed by: INTERNAL MEDICINE

## 2019-01-03 DIAGNOSIS — I10 ESSENTIAL HYPERTENSION WITH GOAL BLOOD PRESSURE LESS THAN 140/90: ICD-10-CM

## 2019-01-03 DIAGNOSIS — E11.9 TYPE 2 DIABETES MELLITUS WITHOUT COMPLICATION, WITHOUT LONG-TERM CURRENT USE OF INSULIN (HCC): ICD-10-CM

## 2019-01-03 DIAGNOSIS — E78.00 HYPERCHOLESTEREMIA: ICD-10-CM

## 2019-01-03 RX ORDER — BISOPROLOL FUMARATE AND HYDROCHLOROTHIAZIDE 6.25; 1 MG/1; MG/1
TABLET ORAL
Qty: 180 TABLET | Refills: 0 | Status: SHIPPED | OUTPATIENT
Start: 2019-01-03 | End: 2019-03-29

## 2019-01-03 RX ORDER — METFORMIN HYDROCHLORIDE 750 MG/1
TABLET, EXTENDED RELEASE ORAL
Qty: 180 TABLET | Refills: 1 | Status: SHIPPED | OUTPATIENT
Start: 2019-01-03 | End: 2019-07-08

## 2019-01-03 RX ORDER — LISINOPRIL 20 MG/1
TABLET ORAL
Qty: 90 TABLET | Refills: 0 | Status: SHIPPED | OUTPATIENT
Start: 2019-01-03 | End: 2019-04-19

## 2019-01-03 NOTE — TELEPHONE ENCOUNTER
Last OV 7/13/18  Last B/P 110/78 on 10/18/18  Last labs were 7/10/18  Last ordered : Bisoprolol-hctz 10/15/18 #180  Lisinopril 10/15/18 #90  Metformin 750 was ordered bid with notation she takes qd with breakfast.

## 2019-03-14 ENCOUNTER — LABORATORY ENCOUNTER (OUTPATIENT)
Dept: LAB | Age: 64
End: 2019-03-14
Attending: INTERNAL MEDICINE
Payer: COMMERCIAL

## 2019-03-14 DIAGNOSIS — I10 ESSENTIAL HYPERTENSION: ICD-10-CM

## 2019-03-14 DIAGNOSIS — E78.00 HYPERCHOLESTEREMIA: Primary | ICD-10-CM

## 2019-03-14 DIAGNOSIS — E11.9 TYPE 2 DIABETES MELLITUS WITHOUT COMPLICATION, WITHOUT LONG-TERM CURRENT USE OF INSULIN (HCC): ICD-10-CM

## 2019-03-14 DIAGNOSIS — E66.01 MORBID OBESITY WITH BMI OF 40.0-44.9, ADULT (HCC): ICD-10-CM

## 2019-03-14 LAB
ALBUMIN SERPL-MCNC: 3.8 G/DL (ref 3.4–5)
ALBUMIN/GLOB SERPL: 1.1 {RATIO} (ref 1–2)
ALP LIVER SERPL-CCNC: 56 U/L (ref 50–130)
ALT SERPL-CCNC: 42 U/L (ref 13–56)
ANION GAP SERPL CALC-SCNC: 7 MMOL/L (ref 0–18)
AST SERPL-CCNC: 18 U/L (ref 15–37)
BASOPHILS # BLD AUTO: 0.05 X10(3) UL (ref 0–0.2)
BASOPHILS NFR BLD AUTO: 0.7 %
BILIRUB SERPL-MCNC: 0.6 MG/DL (ref 0.1–2)
BUN BLD-MCNC: 16 MG/DL (ref 7–18)
BUN/CREAT SERPL: 19.5 (ref 10–20)
CALCIUM BLD-MCNC: 9.4 MG/DL (ref 8.5–10.1)
CHLORIDE SERPL-SCNC: 102 MMOL/L (ref 98–107)
CHOLEST SMN-MCNC: 265 MG/DL (ref ?–200)
CO2 SERPL-SCNC: 29 MMOL/L (ref 21–32)
CREAT BLD-MCNC: 0.82 MG/DL (ref 0.55–1.02)
CREAT UR-SCNC: 128 MG/DL
DEPRECATED RDW RBC AUTO: 44.4 FL (ref 35.1–46.3)
EOSINOPHIL # BLD AUTO: 0.3 X10(3) UL (ref 0–0.7)
EOSINOPHIL NFR BLD AUTO: 4.1 %
ERYTHROCYTE [DISTWIDTH] IN BLOOD BY AUTOMATED COUNT: 12.6 % (ref 11–15)
EST. AVERAGE GLUCOSE BLD GHB EST-MCNC: 148 MG/DL (ref 68–126)
GLOBULIN PLAS-MCNC: 3.6 G/DL (ref 2.8–4.4)
GLUCOSE BLD-MCNC: 159 MG/DL (ref 70–99)
HBA1C MFR BLD HPLC: 6.8 % (ref ?–5.7)
HCT VFR BLD AUTO: 41.4 % (ref 35–48)
HDLC SERPL-MCNC: 64 MG/DL (ref 40–59)
HGB BLD-MCNC: 13.3 G/DL (ref 12–16)
IMM GRANULOCYTES # BLD AUTO: 0.03 X10(3) UL (ref 0–1)
IMM GRANULOCYTES NFR BLD: 0.4 %
LDLC SERPL CALC-MCNC: 152 MG/DL (ref ?–100)
LYMPHOCYTES # BLD AUTO: 2.03 X10(3) UL (ref 1–4)
LYMPHOCYTES NFR BLD AUTO: 28 %
M PROTEIN MFR SERPL ELPH: 7.4 G/DL (ref 6.4–8.2)
MCH RBC QN AUTO: 30.8 PG (ref 26–34)
MCHC RBC AUTO-ENTMCNC: 32.1 G/DL (ref 31–37)
MCV RBC AUTO: 95.8 FL (ref 80–100)
MICROALBUMIN UR-MCNC: 2.23 MG/DL
MICROALBUMIN/CREAT 24H UR-RTO: 17.4 UG/MG (ref ?–30)
MONOCYTES # BLD AUTO: 0.53 X10(3) UL (ref 0.1–1)
MONOCYTES NFR BLD AUTO: 7.3 %
NEUTROPHILS # BLD AUTO: 4.3 X10 (3) UL (ref 1.5–7.7)
NEUTROPHILS # BLD AUTO: 4.3 X10(3) UL (ref 1.5–7.7)
NEUTROPHILS NFR BLD AUTO: 59.5 %
NONHDLC SERPL-MCNC: 201 MG/DL (ref ?–130)
OSMOLALITY SERPL CALC.SUM OF ELEC: 291 MOSM/KG (ref 275–295)
PLATELET # BLD AUTO: 234 10(3)UL (ref 150–450)
POTASSIUM SERPL-SCNC: 3.9 MMOL/L (ref 3.5–5.1)
RBC # BLD AUTO: 4.32 X10(6)UL (ref 3.8–5.3)
SODIUM SERPL-SCNC: 138 MMOL/L (ref 136–145)
TRIGL SERPL-MCNC: 247 MG/DL (ref 30–149)
VLDLC SERPL CALC-MCNC: 49 MG/DL (ref 0–30)
WBC # BLD AUTO: 7.2 X10(3) UL (ref 4–11)

## 2019-03-14 PROCEDURE — 85025 COMPLETE CBC W/AUTO DIFF WBC: CPT | Performed by: INTERNAL MEDICINE

## 2019-03-14 PROCEDURE — 83036 HEMOGLOBIN GLYCOSYLATED A1C: CPT | Performed by: INTERNAL MEDICINE

## 2019-03-14 PROCEDURE — 80053 COMPREHEN METABOLIC PANEL: CPT | Performed by: INTERNAL MEDICINE

## 2019-03-14 PROCEDURE — 80061 LIPID PANEL: CPT | Performed by: INTERNAL MEDICINE

## 2019-03-14 PROCEDURE — 82570 ASSAY OF URINE CREATININE: CPT | Performed by: INTERNAL MEDICINE

## 2019-03-14 PROCEDURE — 82043 UR ALBUMIN QUANTITATIVE: CPT | Performed by: INTERNAL MEDICINE

## 2019-03-14 PROCEDURE — 36415 COLL VENOUS BLD VENIPUNCTURE: CPT | Performed by: INTERNAL MEDICINE

## 2019-03-15 ENCOUNTER — TELEPHONE (OUTPATIENT)
Dept: FAMILY MEDICINE CLINIC | Facility: CLINIC | Age: 64
End: 2019-03-15

## 2019-03-15 NOTE — TELEPHONE ENCOUNTER
IN PT LAB SUMMARY, DR Susie Darden SUGGESTED PT TRY NEW MEDICATION FOR CHOLESTEROL.  PT HAS QUESTIONS, PLEASE CALL

## 2019-03-29 DIAGNOSIS — I10 ESSENTIAL HYPERTENSION WITH GOAL BLOOD PRESSURE LESS THAN 140/90: ICD-10-CM

## 2019-03-29 DIAGNOSIS — E11.9 TYPE 2 DIABETES MELLITUS WITHOUT COMPLICATION, WITHOUT LONG-TERM CURRENT USE OF INSULIN (HCC): ICD-10-CM

## 2019-03-29 DIAGNOSIS — E78.00 HYPERCHOLESTEREMIA: ICD-10-CM

## 2019-03-29 RX ORDER — BISOPROLOL FUMARATE AND HYDROCHLOROTHIAZIDE 6.25; 1 MG/1; MG/1
TABLET ORAL
Qty: 180 TABLET | Refills: 0 | Status: SHIPPED | OUTPATIENT
Start: 2019-03-29 | End: 2019-04-19

## 2019-03-29 NOTE — TELEPHONE ENCOUNTER
Bisoprolol-HCTZ    Last office visit: 7/11/2018  Last refill: 1/3/2019, #180  Labs completed: 3/14/2019    Future Appointments   Date Time Provider Trista Dunn   4/19/2019 10:00 AM ZENON Hernandez [facility] [place]   5/9/2019  8:50 AM Mock,

## 2019-04-19 ENCOUNTER — OFFICE VISIT (OUTPATIENT)
Dept: FAMILY MEDICINE CLINIC | Facility: CLINIC | Age: 64
End: 2019-04-19
Payer: COMMERCIAL

## 2019-04-19 VITALS
BODY MASS INDEX: 43.52 KG/M2 | DIASTOLIC BLOOD PRESSURE: 80 MMHG | HEIGHT: 61 IN | TEMPERATURE: 98 F | RESPIRATION RATE: 16 BRPM | SYSTOLIC BLOOD PRESSURE: 138 MMHG | HEART RATE: 66 BPM | WEIGHT: 230.5 LBS | OXYGEN SATURATION: 96 %

## 2019-04-19 DIAGNOSIS — I10 ESSENTIAL HYPERTENSION: ICD-10-CM

## 2019-04-19 DIAGNOSIS — Z01.419 ROUTINE GYNECOLOGICAL EXAMINATION: Primary | ICD-10-CM

## 2019-04-19 DIAGNOSIS — E78.00 HYPERCHOLESTEREMIA: ICD-10-CM

## 2019-04-19 DIAGNOSIS — E11.9 TYPE 2 DIABETES MELLITUS WITHOUT COMPLICATION, WITHOUT LONG-TERM CURRENT USE OF INSULIN (HCC): ICD-10-CM

## 2019-04-19 DIAGNOSIS — Z85.828 HISTORY OF BASAL CELL CARCINOMA OF EYELID: ICD-10-CM

## 2019-04-19 PROCEDURE — 99396 PREV VISIT EST AGE 40-64: CPT | Performed by: NURSE PRACTITIONER

## 2019-04-19 PROCEDURE — 87624 HPV HI-RISK TYP POOLED RSLT: CPT | Performed by: NURSE PRACTITIONER

## 2019-04-19 PROCEDURE — 88175 CYTOPATH C/V AUTO FLUID REDO: CPT | Performed by: NURSE PRACTITIONER

## 2019-04-19 NOTE — PROGRESS NOTES
HPI:   Here for annual gyne exam. She had blood work last month and was started on Crestor. She is tolerating the medication well.        Wt Readings from Last 6 Encounters:  04/19/19 : 230 lb 8 oz  10/18/18 : 232 lb  08/09/18 : 227 lb 6.4 oz  07/19/18 : 22 Types: 1 - 2 Standard drinks or equivalent per week      Comment: occ    Drug use: No       REVIEW OF SYSTEMS:   Review of Systems   Constitutional: Negative for appetite change, chills, fatigue, fever and unexpected weight change.    HENT: Negative for Pulmonary/Chest: Effort normal and breath sounds normal. No respiratory distress. Abdominal: Soft. Bowel sounds are normal. There is no hepatosplenomegaly.    Genitourinary: Vagina normal and uterus normal. There is no rash or tenderness on the right labi

## 2019-05-05 DIAGNOSIS — E11.9 TYPE 2 DIABETES MELLITUS WITHOUT COMPLICATION, WITHOUT LONG-TERM CURRENT USE OF INSULIN (HCC): ICD-10-CM

## 2019-05-05 DIAGNOSIS — E78.00 HYPERCHOLESTEREMIA: ICD-10-CM

## 2019-05-05 DIAGNOSIS — I10 ESSENTIAL HYPERTENSION WITH GOAL BLOOD PRESSURE LESS THAN 140/90: ICD-10-CM

## 2019-05-06 RX ORDER — LISINOPRIL 20 MG/1
TABLET ORAL
Qty: 90 TABLET | Refills: 0 | Status: SHIPPED | OUTPATIENT
Start: 2019-05-06 | End: 2019-05-09

## 2019-05-06 NOTE — TELEPHONE ENCOUNTER
1/3/19 90 day   Last BP 4/19/19   138/80  Last OV with Dr Eulalia Sawyer 7/11/18  Labs done march 2019

## 2019-05-09 ENCOUNTER — OFFICE VISIT (OUTPATIENT)
Dept: FAMILY MEDICINE CLINIC | Facility: CLINIC | Age: 64
End: 2019-05-09
Payer: COMMERCIAL

## 2019-05-09 VITALS
DIASTOLIC BLOOD PRESSURE: 82 MMHG | SYSTOLIC BLOOD PRESSURE: 128 MMHG | HEART RATE: 60 BPM | OXYGEN SATURATION: 94 % | WEIGHT: 227.38 LBS | TEMPERATURE: 97 F | BODY MASS INDEX: 43 KG/M2

## 2019-05-09 DIAGNOSIS — G47.33 OBSTRUCTIVE SLEEP APNEA: Primary | ICD-10-CM

## 2019-05-09 PROCEDURE — 99214 OFFICE O/P EST MOD 30 MIN: CPT | Performed by: FAMILY MEDICINE

## 2019-05-09 NOTE — PROGRESS NOTES
[facility]        HPI:   This is a 61year old female coming in for Patient presents with:  Obstructive Sleep Apnea (SARBJIT): 6 month follow up      HPI:  States she is doing well with her cpap.   She is having a harder sleep? - -   Do you have or are you being treated for high blood pressure? - -   BMI more than 35kg/mg2? - -   Age over 48years old? - -   Neck circumference >16 inches (40 cm)?  - -   Gender Male? - -   Score and SARBJIT Risk - -   Obstructive Sleep Apnea Ris 0   latanoprost 0.005 % Ophthalmic Solution Place 1 drop into both eyes nightly.  Disp:  Rfl:       Counseling given: Not Answered         Problem List:  Patient Active Problem List:     Type 2 diabetes mellitus without complication (HCC)     ABUNDIO (hard of h normal.   Mouth/Throat: Oropharynx is clear and moist. No oropharyngeal exudate. Mal 4 tonsils 0. B/l cerumen,   B/l hearing aids. Eyes: Conjunctivae and EOM are normal. Right eye exhibits no discharge. Left eye exhibits no discharge.  No scleral ict any questions, complications, allergies, or worsening or changing symptoms. Parent is to call with any side effects or complications from the treatments as a result of today.      \" This note was created utilizing Dragon speech recognition software. Eren Casiano

## 2019-05-10 ENCOUNTER — OFFICE VISIT (OUTPATIENT)
Dept: FAMILY MEDICINE CLINIC | Facility: CLINIC | Age: 64
End: 2019-05-10
Payer: COMMERCIAL

## 2019-05-10 VITALS
OXYGEN SATURATION: 94 % | SYSTOLIC BLOOD PRESSURE: 122 MMHG | DIASTOLIC BLOOD PRESSURE: 88 MMHG | BODY MASS INDEX: 43.8 KG/M2 | WEIGHT: 232 LBS | HEART RATE: 68 BPM | TEMPERATURE: 98 F | HEIGHT: 61 IN

## 2019-05-10 DIAGNOSIS — H61.23 BILATERAL IMPACTED CERUMEN: Primary | ICD-10-CM

## 2019-05-10 PROCEDURE — 99213 OFFICE O/P EST LOW 20 MIN: CPT | Performed by: FAMILY MEDICINE

## 2019-05-10 NOTE — PROGRESS NOTES
Rhett Elder is a 61year old female. Patient presents with:  Ear Wax: right .  inrm 6    Chief Complaint Reviewed and Verified  Nursing Notes Reviewed and Verified  Tobacco Reviewed  Allergies Reviewed  Medications Reviewed  Problem List Reviewe 110/78  08/09/18 : 126/80  07/19/18 : 126/74      Wt Readings from Last 6 Encounters:  05/10/19 : 232 lb  05/09/19 : 227 lb 6.4 oz  04/19/19 : 230 lb 8 oz  10/18/18 : 232 lb  08/09/18 : 227 lb 6.4 oz  07/19/18 : 227 lb 12.8 oz      REVIEW OF SYSTEMS:   GEN prescriptions requested or ordered in this encounter           Claudeen Brodie, M.D., FAAFP      The patient indicates understanding of these issues and agrees to the plan.

## 2019-06-14 RX ORDER — ROSUVASTATIN CALCIUM 10 MG/1
TABLET, COATED ORAL
Qty: 90 TABLET | Refills: 0 | Status: SHIPPED | OUTPATIENT
Start: 2019-06-14 | End: 2019-08-26

## 2019-06-14 NOTE — TELEPHONE ENCOUNTER
LOV- 4-19-19 with PJ for wellness  Last refill- 3/15/2019 #90 with no refills  Labs- 3/14/2019  Medication refilled per protocol.

## 2019-07-08 DIAGNOSIS — E11.9 TYPE 2 DIABETES MELLITUS WITHOUT COMPLICATION, WITHOUT LONG-TERM CURRENT USE OF INSULIN (HCC): ICD-10-CM

## 2019-07-08 DIAGNOSIS — E78.00 HYPERCHOLESTEREMIA: ICD-10-CM

## 2019-07-08 DIAGNOSIS — I10 ESSENTIAL HYPERTENSION WITH GOAL BLOOD PRESSURE LESS THAN 140/90: ICD-10-CM

## 2019-07-08 RX ORDER — BISOPROLOL FUMARATE AND HYDROCHLOROTHIAZIDE 6.25; 1 MG/1; MG/1
TABLET ORAL
Qty: 180 TABLET | Refills: 1 | Status: SHIPPED | OUTPATIENT
Start: 2019-07-08 | End: 2020-01-06

## 2019-07-08 RX ORDER — METFORMIN HYDROCHLORIDE 750 MG/1
TABLET, EXTENDED RELEASE ORAL
Qty: 180 TABLET | Refills: 1 | Status: SHIPPED | OUTPATIENT
Start: 2019-07-08 | End: 2020-01-06

## 2019-07-08 NOTE — TELEPHONE ENCOUNTER
Last office visit: 5/10/2019  Last refill:    Bisoprolol-HCTZ: 3/29/2019, #180   Metformin: 1/3/2019, #180, 1 refill  Labs completed: 3/14/2019    Future Appointments   Date Time Provider Trista Dunn   11/14/2019  9:30 AM Jessica Ricks MD [facility]

## 2019-08-02 DIAGNOSIS — E78.00 HYPERCHOLESTEREMIA: ICD-10-CM

## 2019-08-02 DIAGNOSIS — I10 ESSENTIAL HYPERTENSION WITH GOAL BLOOD PRESSURE LESS THAN 140/90: ICD-10-CM

## 2019-08-02 DIAGNOSIS — E11.9 TYPE 2 DIABETES MELLITUS WITHOUT COMPLICATION, WITHOUT LONG-TERM CURRENT USE OF INSULIN (HCC): ICD-10-CM

## 2019-08-02 RX ORDER — LISINOPRIL 20 MG/1
TABLET ORAL
Qty: 90 TABLET | Refills: 0 | Status: SHIPPED | OUTPATIENT
Start: 2019-08-02 | End: 2019-10-28

## 2019-08-02 NOTE — TELEPHONE ENCOUNTER
Lov 5/10/19  Last refill # 20 x 0  Last lab 3/14/19 Cmp  Future Appointments   Date Time Provider Trista Dunn   11/14/2019  9:30 AM Reji De La Paz MD [facility] Elinor Loud

## 2019-08-26 RX ORDER — ROSUVASTATIN CALCIUM 10 MG/1
TABLET, COATED ORAL
Qty: 90 TABLET | Refills: 0 | Status: SHIPPED | OUTPATIENT
Start: 2019-08-26 | End: 2019-12-14

## 2019-08-26 NOTE — TELEPHONE ENCOUNTER
Last office visit: 5/10/19  Last refill: 6/14/19  Last lab: 3/14/19  Future Appointments   Date Time Provider Trista Marshalli   9/5/2019 10:00 AM Abiola Aguilar MD [facility] Kaylen Toddrt   11/14/2019  9:30 AM Verner Cones, MD [facility] Tonja Srinivasan

## 2019-10-28 DIAGNOSIS — I10 ESSENTIAL HYPERTENSION WITH GOAL BLOOD PRESSURE LESS THAN 140/90: ICD-10-CM

## 2019-10-28 DIAGNOSIS — E11.9 TYPE 2 DIABETES MELLITUS WITHOUT COMPLICATION, WITHOUT LONG-TERM CURRENT USE OF INSULIN (HCC): ICD-10-CM

## 2019-10-28 DIAGNOSIS — E78.00 HYPERCHOLESTEREMIA: ICD-10-CM

## 2019-10-28 RX ORDER — LISINOPRIL 20 MG/1
TABLET ORAL
Qty: 90 TABLET | Refills: 0 | Status: SHIPPED | OUTPATIENT
Start: 2019-10-28 | End: 2020-01-13

## 2019-10-29 ENCOUNTER — APPOINTMENT (OUTPATIENT)
Dept: LAB | Age: 64
End: 2019-10-29
Attending: INTERNAL MEDICINE
Payer: COMMERCIAL

## 2019-10-29 DIAGNOSIS — E11.9 TYPE 2 DIABETES MELLITUS WITHOUT COMPLICATION, WITHOUT LONG-TERM CURRENT USE OF INSULIN (HCC): ICD-10-CM

## 2019-10-29 DIAGNOSIS — E11.9 TYPE 2 DIABETES MELLITUS WITHOUT COMPLICATION, WITHOUT LONG-TERM CURRENT USE OF INSULIN (HCC): Primary | ICD-10-CM

## 2019-10-29 DIAGNOSIS — E78.00 HYPERCHOLESTEREMIA: ICD-10-CM

## 2019-10-29 PROCEDURE — 83036 HEMOGLOBIN GLYCOSYLATED A1C: CPT | Performed by: INTERNAL MEDICINE

## 2019-10-29 PROCEDURE — 80061 LIPID PANEL: CPT | Performed by: INTERNAL MEDICINE

## 2019-10-29 PROCEDURE — 36415 COLL VENOUS BLD VENIPUNCTURE: CPT | Performed by: INTERNAL MEDICINE

## 2019-10-29 PROCEDURE — 80053 COMPREHEN METABOLIC PANEL: CPT | Performed by: INTERNAL MEDICINE

## 2019-11-05 ENCOUNTER — OFFICE VISIT (OUTPATIENT)
Dept: FAMILY MEDICINE CLINIC | Facility: CLINIC | Age: 64
End: 2019-11-05
Payer: COMMERCIAL

## 2019-11-05 VITALS
DIASTOLIC BLOOD PRESSURE: 70 MMHG | BODY MASS INDEX: 43.79 KG/M2 | SYSTOLIC BLOOD PRESSURE: 130 MMHG | HEIGHT: 62 IN | WEIGHT: 238 LBS | RESPIRATION RATE: 20 BRPM | HEART RATE: 82 BPM | OXYGEN SATURATION: 96 % | TEMPERATURE: 97 F

## 2019-11-05 DIAGNOSIS — E11.9 TYPE 2 DIABETES MELLITUS WITHOUT COMPLICATION, WITHOUT LONG-TERM CURRENT USE OF INSULIN (HCC): Primary | ICD-10-CM

## 2019-11-05 DIAGNOSIS — M17.0 PRIMARY OSTEOARTHRITIS OF BOTH KNEES: ICD-10-CM

## 2019-11-05 PROCEDURE — 99214 OFFICE O/P EST MOD 30 MIN: CPT | Performed by: INTERNAL MEDICINE

## 2019-11-05 RX ORDER — MELOXICAM 15 MG/1
15 TABLET ORAL DAILY
Qty: 90 TABLET | Refills: 0 | Status: SHIPPED | OUTPATIENT
Start: 2019-11-05 | End: 2020-02-10

## 2019-11-05 NOTE — PROGRESS NOTES
HPI:   Eusebio Pandya is a 59year old female who presents for recheck of her diabetes. Patient’s FBS have been unknown. Last visit with ophthalmologist was last year. Pt has been checking her feet on a regular basis.  Pt denies any tingling of the Tablet 24 Hr TAKE 2 TABLETS BY MOUTH ONCE DAILY WITH BREAKFAST 180 tablet 1   • BISOPROLOL-HYDROCHLOROTHIAZIDE 10-6.25 MG Oral Tab TAKE 2 TABLETS BY MOUTH ONCE DAILY 180 tablet 1   • latanoprost 0.005 % Ophthalmic Solution Place 1 drop into both eyes night year old female who presents for a recheck of her diabetes. Diabetic control is worsening.   Recommendations are: continue present meds, check HgbA1C, check fasting lipids and CMP, lose wgt with carbohydrate controlled diet and exercise, refer to [facility]

## 2019-11-14 ENCOUNTER — OFFICE VISIT (OUTPATIENT)
Dept: FAMILY MEDICINE CLINIC | Facility: CLINIC | Age: 64
End: 2019-11-14
Payer: COMMERCIAL

## 2019-11-14 VITALS
OXYGEN SATURATION: 95 % | HEIGHT: 61 IN | BODY MASS INDEX: 45.16 KG/M2 | SYSTOLIC BLOOD PRESSURE: 134 MMHG | HEART RATE: 64 BPM | RESPIRATION RATE: 20 BRPM | WEIGHT: 239.19 LBS | TEMPERATURE: 98 F | DIASTOLIC BLOOD PRESSURE: 80 MMHG

## 2019-11-14 DIAGNOSIS — G47.33 OBSTRUCTIVE SLEEP APNEA: Primary | ICD-10-CM

## 2019-11-14 PROCEDURE — 99213 OFFICE O/P EST LOW 20 MIN: CPT | Performed by: NURSE PRACTITIONER

## 2019-11-15 NOTE — PROGRESS NOTES
[facility]        HPI:   This is a 59year old female coming in for Patient presents with:  Obstructive Sleep Apnea (SARBJIT): compliance f/u      HPI:     Tresa Quezada is present to review her sleep therapy.   States that sh Surgical History:   Procedure Laterality Date   • ADENOIDECTOMY     • CHOLECYSTECTOMY     •      • TONSILLECTOMY       Social History:  Social History    Patient does not qualify to have social determinant information on file (likely too young).     Soc Negative. HENT: Negative. Eyes: Negative. Respiratory: Negative. Cardiovascular: Negative. Musculoskeletal: Negative. Skin: Negative. Neurological: Negative. Psychiatric/Behavioral: Negative.         EXAM:   /80   Pulse 64   Te therapy. Follow-up in 6 months - sooner if needed. Advised if still with sleep apnea and not using CPAP has a  7 fold increase in risk of heart attack, stroke, abnormal heart rhythm  and death,  increased risk of driving accidents.    Advised to refra

## 2019-12-14 RX ORDER — ROSUVASTATIN CALCIUM 10 MG/1
TABLET, COATED ORAL
Qty: 90 TABLET | Refills: 0 | Status: SHIPPED | OUTPATIENT
Start: 2019-12-14 | End: 2020-03-02

## 2020-01-06 DIAGNOSIS — I10 ESSENTIAL HYPERTENSION WITH GOAL BLOOD PRESSURE LESS THAN 140/90: ICD-10-CM

## 2020-01-06 DIAGNOSIS — E78.00 HYPERCHOLESTEREMIA: ICD-10-CM

## 2020-01-06 DIAGNOSIS — E11.9 TYPE 2 DIABETES MELLITUS WITHOUT COMPLICATION, WITHOUT LONG-TERM CURRENT USE OF INSULIN (HCC): ICD-10-CM

## 2020-01-06 RX ORDER — METFORMIN HYDROCHLORIDE 750 MG/1
TABLET, EXTENDED RELEASE ORAL
Qty: 180 TABLET | Refills: 0 | Status: SHIPPED | OUTPATIENT
Start: 2020-01-06 | End: 2020-04-02

## 2020-01-06 RX ORDER — BISOPROLOL FUMARATE AND HYDROCHLOROTHIAZIDE 6.25; 1 MG/1; MG/1
TABLET ORAL
Qty: 180 TABLET | Refills: 0 | Status: SHIPPED | OUTPATIENT
Start: 2020-01-06 | End: 2020-04-02

## 2020-01-06 NOTE — TELEPHONE ENCOUNTER
Last office visit: 11/5/19  Last refill: 7/8/19  Last labs: 10/29/19  Future Appointments   Date Time Provider Trista Dunn   5/28/2020  9:30 AM Mike Garcia APRN [facility] Rodgers

## 2020-01-13 DIAGNOSIS — I10 ESSENTIAL HYPERTENSION WITH GOAL BLOOD PRESSURE LESS THAN 140/90: ICD-10-CM

## 2020-01-13 DIAGNOSIS — E78.00 HYPERCHOLESTEREMIA: ICD-10-CM

## 2020-01-13 DIAGNOSIS — E11.9 TYPE 2 DIABETES MELLITUS WITHOUT COMPLICATION, WITHOUT LONG-TERM CURRENT USE OF INSULIN (HCC): ICD-10-CM

## 2020-01-13 RX ORDER — LISINOPRIL 20 MG/1
TABLET ORAL
Qty: 90 TABLET | Refills: 0 | Status: SHIPPED | OUTPATIENT
Start: 2020-01-13 | End: 2020-04-02

## 2020-01-13 NOTE — TELEPHONE ENCOUNTER
Last office visit: 11/5/19 /90  Last refill: 10/28/19  Last labs: 10/29/19  Future Appointments   Date Time Provider Trista Dunn   5/28/2020  9:30 AM Wendy Garcia APRN [facility] Raquel Cutting

## 2020-02-10 DIAGNOSIS — M17.0 PRIMARY OSTEOARTHRITIS OF BOTH KNEES: ICD-10-CM

## 2020-02-10 RX ORDER — MELOXICAM 15 MG/1
TABLET ORAL
Qty: 90 TABLET | Refills: 0 | Status: SHIPPED | OUTPATIENT
Start: 2020-02-10 | End: 2020-05-12

## 2020-02-10 NOTE — TELEPHONE ENCOUNTER
Last refill #30 on 9/5/19  Last office visit on 11/5/19  Future Appointments   Date Time Provider Trista Dunn   5/28/2020  9:30 AM Vivien Garcia APRN [facility] Carmine Karvonen

## 2020-03-02 DIAGNOSIS — E11.9 TYPE 2 DIABETES MELLITUS WITHOUT COMPLICATION, WITHOUT LONG-TERM CURRENT USE OF INSULIN (HCC): ICD-10-CM

## 2020-03-02 DIAGNOSIS — I10 ESSENTIAL HYPERTENSION: ICD-10-CM

## 2020-03-02 DIAGNOSIS — E78.00 HYPERCHOLESTEREMIA: Primary | ICD-10-CM

## 2020-03-02 RX ORDER — ROSUVASTATIN CALCIUM 10 MG/1
TABLET, COATED ORAL
Qty: 90 TABLET | Refills: 0 | Status: SHIPPED | OUTPATIENT
Start: 2020-03-02 | End: 2020-06-01

## 2020-03-02 NOTE — TELEPHONE ENCOUNTER
Last office visit: 11/05/2019  Last Lipid: 10/29/2019  Last refill: 12/14/2019  Requested Prescriptions     Pending Prescriptions Disp Refills   • ROSUVASTATIN CALCIUM 10 MG Oral Tab [Pharmacy Med Name: Rosuvastatin Calcium 10 MG Oral Tablet] 90 tablet 0

## 2020-03-04 ENCOUNTER — APPOINTMENT (OUTPATIENT)
Dept: LAB | Age: 65
End: 2020-03-04
Attending: INTERNAL MEDICINE
Payer: COMMERCIAL

## 2020-03-04 DIAGNOSIS — I10 ESSENTIAL HYPERTENSION: ICD-10-CM

## 2020-03-04 DIAGNOSIS — E78.00 HYPERCHOLESTEREMIA: ICD-10-CM

## 2020-03-04 DIAGNOSIS — E11.9 TYPE 2 DIABETES MELLITUS WITHOUT COMPLICATION, WITHOUT LONG-TERM CURRENT USE OF INSULIN (HCC): ICD-10-CM

## 2020-03-04 LAB
ALBUMIN SERPL-MCNC: 3.6 G/DL (ref 3.4–5)
ALBUMIN/GLOB SERPL: 1 {RATIO} (ref 1–2)
ALP LIVER SERPL-CCNC: 63 U/L (ref 50–130)
ALT SERPL-CCNC: 59 U/L (ref 13–56)
ANION GAP SERPL CALC-SCNC: 5 MMOL/L (ref 0–18)
AST SERPL-CCNC: 25 U/L (ref 15–37)
BILIRUB SERPL-MCNC: 0.7 MG/DL (ref 0.1–2)
BUN BLD-MCNC: 15 MG/DL (ref 7–18)
BUN/CREAT SERPL: 17.9 (ref 10–20)
CALCIUM BLD-MCNC: 9.4 MG/DL (ref 8.5–10.1)
CHLORIDE SERPL-SCNC: 104 MMOL/L (ref 98–112)
CHOLEST SMN-MCNC: 137 MG/DL (ref ?–200)
CO2 SERPL-SCNC: 30 MMOL/L (ref 21–32)
CREAT BLD-MCNC: 0.84 MG/DL (ref 0.55–1.02)
CREAT UR-SCNC: 55.8 MG/DL
EST. AVERAGE GLUCOSE BLD GHB EST-MCNC: 166 MG/DL (ref 68–126)
GLOBULIN PLAS-MCNC: 3.7 G/DL (ref 2.8–4.4)
GLUCOSE BLD-MCNC: 169 MG/DL (ref 70–99)
HBA1C MFR BLD HPLC: 7.4 % (ref ?–5.7)
HDLC SERPL-MCNC: 70 MG/DL (ref 40–59)
LDLC SERPL CALC-MCNC: 42 MG/DL (ref ?–100)
M PROTEIN MFR SERPL ELPH: 7.3 G/DL (ref 6.4–8.2)
MICROALBUMIN UR-MCNC: 0.76 MG/DL
MICROALBUMIN/CREAT 24H UR-RTO: 13.6 UG/MG (ref ?–30)
NONHDLC SERPL-MCNC: 67 MG/DL (ref ?–130)
OSMOLALITY SERPL CALC.SUM OF ELEC: 293 MOSM/KG (ref 275–295)
PATIENT FASTING Y/N/NP: YES
PATIENT FASTING Y/N/NP: YES
POTASSIUM SERPL-SCNC: 4.3 MMOL/L (ref 3.5–5.1)
SODIUM SERPL-SCNC: 139 MMOL/L (ref 136–145)
TRIGL SERPL-MCNC: 123 MG/DL (ref 30–149)
VLDLC SERPL CALC-MCNC: 25 MG/DL (ref 0–30)

## 2020-03-04 PROCEDURE — 80053 COMPREHEN METABOLIC PANEL: CPT | Performed by: INTERNAL MEDICINE

## 2020-03-04 PROCEDURE — 36415 COLL VENOUS BLD VENIPUNCTURE: CPT | Performed by: INTERNAL MEDICINE

## 2020-03-04 PROCEDURE — 82043 UR ALBUMIN QUANTITATIVE: CPT | Performed by: INTERNAL MEDICINE

## 2020-03-04 PROCEDURE — 82570 ASSAY OF URINE CREATININE: CPT | Performed by: INTERNAL MEDICINE

## 2020-03-04 PROCEDURE — 80061 LIPID PANEL: CPT | Performed by: INTERNAL MEDICINE

## 2020-03-04 PROCEDURE — 83036 HEMOGLOBIN GLYCOSYLATED A1C: CPT | Performed by: INTERNAL MEDICINE

## 2020-04-02 DIAGNOSIS — I10 ESSENTIAL HYPERTENSION WITH GOAL BLOOD PRESSURE LESS THAN 140/90: ICD-10-CM

## 2020-04-02 DIAGNOSIS — E11.9 TYPE 2 DIABETES MELLITUS WITHOUT COMPLICATION, WITHOUT LONG-TERM CURRENT USE OF INSULIN (HCC): ICD-10-CM

## 2020-04-02 DIAGNOSIS — E78.00 HYPERCHOLESTEREMIA: ICD-10-CM

## 2020-04-02 RX ORDER — METFORMIN HYDROCHLORIDE 750 MG/1
TABLET, EXTENDED RELEASE ORAL
Qty: 180 TABLET | Refills: 0 | Status: SHIPPED | OUTPATIENT
Start: 2020-04-02 | End: 2020-06-29

## 2020-04-02 RX ORDER — LISINOPRIL 20 MG/1
TABLET ORAL
Qty: 90 TABLET | Refills: 0 | Status: SHIPPED | OUTPATIENT
Start: 2020-04-02 | End: 2020-06-29

## 2020-04-02 RX ORDER — BISOPROLOL FUMARATE AND HYDROCHLOROTHIAZIDE 6.25; 1 MG/1; MG/1
TABLET ORAL
Qty: 180 TABLET | Refills: 0 | Status: SHIPPED | OUTPATIENT
Start: 2020-04-02 | End: 2020-06-29

## 2020-04-02 NOTE — TELEPHONE ENCOUNTER
Metformin     Last refill: 01/06/20  Qty: 180  W/ 0 refills  Last ov: 11/05/19    Bisoprolol-hydrochlorothiazide    Last refill: 01/06/20  Qty: 180  W/ 0 refills  Last ov: 11/05/19      Requested Prescriptions     Pending Prescriptions Disp Refills   • BIS

## 2020-04-02 NOTE — TELEPHONE ENCOUNTER
Last refill: 01/13/20  Qty: 90  W/ 0 refills  Last ov: 11/05/19    Requested Prescriptions     Pending Prescriptions Disp Refills   • LISINOPRIL 20 MG Oral Tab [Pharmacy Med Name: Lisinopril 20 MG Oral Tablet] 90 tablet 0     Sig: Take 1 tablet by mouth on

## 2020-05-12 DIAGNOSIS — M17.0 PRIMARY OSTEOARTHRITIS OF BOTH KNEES: ICD-10-CM

## 2020-05-12 RX ORDER — MELOXICAM 15 MG/1
TABLET ORAL
Qty: 90 TABLET | Refills: 0 | Status: SHIPPED | OUTPATIENT
Start: 2020-05-12 | End: 2020-08-12

## 2020-05-12 NOTE — TELEPHONE ENCOUNTER
Last refill: 2/10/20 #90 w/ 0 refill  Last OV: 11/5/19    Future Appointments   Date Time Provider Trista Dunn   5/28/2020  9:30 AM Bibiana Garcia APRN [address] [facility] Mendel Rojas     DM labs 3/4/20

## 2020-05-28 ENCOUNTER — VIRTUAL PHONE E/M (OUTPATIENT)
Dept: FAMILY MEDICINE CLINIC | Facility: CLINIC | Age: 65
End: 2020-05-28
Payer: COMMERCIAL

## 2020-05-28 DIAGNOSIS — G47.33 OBSTRUCTIVE SLEEP APNEA: Primary | ICD-10-CM

## 2020-05-28 PROCEDURE — 99212 OFFICE O/P EST SF 10 MIN: CPT | Performed by: NURSE PRACTITIONER

## 2020-06-01 ENCOUNTER — TELEPHONE (OUTPATIENT)
Dept: FAMILY MEDICINE CLINIC | Facility: CLINIC | Age: 65
End: 2020-06-01

## 2020-06-01 RX ORDER — ROSUVASTATIN CALCIUM 10 MG/1
10 TABLET, COATED ORAL NIGHTLY
Qty: 90 TABLET | Refills: 0 | Status: SHIPPED | OUTPATIENT
Start: 2020-06-01 | End: 2020-06-01

## 2020-06-01 RX ORDER — ROSUVASTATIN CALCIUM 10 MG/1
10 TABLET, COATED ORAL NIGHTLY
Qty: 90 TABLET | Refills: 0 | Status: SHIPPED | OUTPATIENT
Start: 2020-06-01 | End: 2020-08-26

## 2020-06-01 RX ORDER — GLIMEPIRIDE 2 MG/1
2 TABLET ORAL
Qty: 90 TABLET | Refills: 0 | Status: SHIPPED | OUTPATIENT
Start: 2020-06-01 | End: 2020-06-01

## 2020-06-01 RX ORDER — GLIMEPIRIDE 2 MG/1
2 TABLET ORAL
Qty: 90 TABLET | Refills: 0 | Status: SHIPPED | OUTPATIENT
Start: 2020-06-01 | End: 2020-08-26

## 2020-06-01 NOTE — TELEPHONE ENCOUNTER
Last Ov w/Dr Jackelyn De Leon 11/5/19  Last Lipid, Hgba1c and CMP 3/4/20  Last refills 3/2/20 and 3/5/20 #90  Patient advised she is overdue for a physical. Does she also need fasting labs or can a Hgba1c fingerstick be done at the appointment?

## 2020-06-11 ENCOUNTER — MED REC SCAN ONLY (OUTPATIENT)
Dept: FAMILY MEDICINE CLINIC | Facility: CLINIC | Age: 65
End: 2020-06-11

## 2020-06-18 ENCOUNTER — OFFICE VISIT (OUTPATIENT)
Dept: FAMILY MEDICINE CLINIC | Facility: CLINIC | Age: 65
End: 2020-06-18
Payer: MEDICARE

## 2020-06-18 DIAGNOSIS — Z11.59 NEED FOR HEPATITIS C SCREENING TEST: ICD-10-CM

## 2020-06-18 DIAGNOSIS — E78.00 HYPERCHOLESTEREMIA: ICD-10-CM

## 2020-06-18 DIAGNOSIS — Z12.31 VISIT FOR SCREENING MAMMOGRAM: ICD-10-CM

## 2020-06-18 DIAGNOSIS — G47.33 OSA (OBSTRUCTIVE SLEEP APNEA): ICD-10-CM

## 2020-06-18 DIAGNOSIS — I10 ESSENTIAL HYPERTENSION: ICD-10-CM

## 2020-06-18 DIAGNOSIS — E11.9 TYPE 2 DIABETES MELLITUS WITHOUT COMPLICATION, WITHOUT LONG-TERM CURRENT USE OF INSULIN (HCC): ICD-10-CM

## 2020-06-18 DIAGNOSIS — H61.23 BILATERAL IMPACTED CERUMEN: ICD-10-CM

## 2020-06-18 DIAGNOSIS — M17.0 PRIMARY OSTEOARTHRITIS OF BOTH KNEES: Primary | ICD-10-CM

## 2020-06-18 DIAGNOSIS — Z00.00 ENCOUNTER FOR ANNUAL HEALTH EXAMINATION: ICD-10-CM

## 2020-06-18 PROCEDURE — 90732 PPSV23 VACC 2 YRS+ SUBQ/IM: CPT | Performed by: INTERNAL MEDICINE

## 2020-06-18 PROCEDURE — G0402 INITIAL PREVENTIVE EXAM: HCPCS | Performed by: INTERNAL MEDICINE

## 2020-06-18 PROCEDURE — 83036 HEMOGLOBIN GLYCOSYLATED A1C: CPT | Performed by: INTERNAL MEDICINE

## 2020-06-18 PROCEDURE — G0009 ADMIN PNEUMOCOCCAL VACCINE: HCPCS | Performed by: INTERNAL MEDICINE

## 2020-06-18 PROCEDURE — G0403 EKG FOR INITIAL PREVENT EXAM: HCPCS | Performed by: INTERNAL MEDICINE

## 2020-06-18 NOTE — PATIENT INSTRUCTIONS
Ankush Ruiz's SCREENING SCHEDULE   Tests on this list are recommended by your physician but may not be covered, or covered at this frequency, by your insurer. Please check with your insurance carrier before scheduling to verify coverage.    PRE Covered every 10 years- more often if abnormal Colonoscopy due on 04/18/2022 Update Health Maintenance if applicable    Flex Sigmoidoscopy Screen  Covered every 5 years No results found for this or any previous visit. No flowsheet data found.      Fecal O (Pneumovax)  Covered Once after 65 No orders found for this or any previous visit. Please get once after your 65th birthday    Hepatitis B for Moderate/High Risk       No orders found for this or any previous visit.  Medium/high risk factors:   End-stage re

## 2020-06-18 NOTE — PROGRESS NOTES
HPI:   Donna Watson is a 59year old female who presents for a Medicare Initial Preventative Physical Exam (Welcome to Medicare- < 12 months on Medicare). Pt has been well, gained weight over the quarantine. New to medicare.   Needs EKG and complication (HCC)     [place] (hard of hearing)     Hypercholesteremia     Cerumen impaction     Essential hypertension     Morbid obesity with BMI of 40.0-44.9, adult (HCC)     Obstructive sleep apnea    Wt Readings from Last 3 Encounters:  06/18/20 : 241 lb exertion  CARDIOVASCULAR: denies chest pain on exertion  GI: denies abdominal pain, denies heartburn  : denies dysuria, vaginal discharge or itching, no complaint of urinary incontinence  MUSCULOSKELETAL: denies back pain  NEURO: denies headaches  PSYCHE Nose: Nares normal, septum midline,mucosa normal, no drainage or sinus tenderness   Throat: Lips, mucosa, and tongue normal; teeth and gums normal   Neck: Supple, symmetrical, trachea midline, no adenopathy;  thyroid: not enlarged, symmetric, no tenderne 1. Encounter for annual health examination  done  - EKG FOR INITIAL PREVENT EXAM    2. Visit for screening mammogram  done  - [facility] SCREENING BILAT (CPT=77067); Future    3. Need for hepatitis C screening test  Next blood draw  - HCV ANTIBODY; Future    4. Cardiovascular Disease Screening     LDL Annually LDL Cholesterol (mg/dL)   Date Value   03/04/2020 42        EKG - w/ Initial Preventative Physical Exam only, or if medically necessary Electrocardiogram date07/11/2018       Colorectal Cancer Screening Homosexual men   Illicit injectable drug abusers     Tetanus Toxoid  Only covered with a cut with metal- TD and TDaP Not covered by Medicare Part B No vaccine history found This may be covered with your prescription benefits, but Medicare does not cover

## 2020-06-22 VITALS
TEMPERATURE: 97 F | SYSTOLIC BLOOD PRESSURE: 130 MMHG | HEIGHT: 61 IN | WEIGHT: 241 LBS | DIASTOLIC BLOOD PRESSURE: 80 MMHG | OXYGEN SATURATION: 98 % | BODY MASS INDEX: 45.5 KG/M2

## 2020-06-29 DIAGNOSIS — E78.00 HYPERCHOLESTEREMIA: ICD-10-CM

## 2020-06-29 DIAGNOSIS — E11.9 TYPE 2 DIABETES MELLITUS WITHOUT COMPLICATION, WITHOUT LONG-TERM CURRENT USE OF INSULIN (HCC): ICD-10-CM

## 2020-06-29 DIAGNOSIS — I10 ESSENTIAL HYPERTENSION WITH GOAL BLOOD PRESSURE LESS THAN 140/90: ICD-10-CM

## 2020-06-29 RX ORDER — LISINOPRIL 20 MG/1
20 TABLET ORAL DAILY
Qty: 90 TABLET | Refills: 0 | Status: SHIPPED | OUTPATIENT
Start: 2020-06-29 | End: 2020-09-28

## 2020-06-29 RX ORDER — BISOPROLOL FUMARATE AND HYDROCHLOROTHIAZIDE 6.25; 1 MG/1; MG/1
2 TABLET ORAL DAILY
Qty: 180 TABLET | Refills: 0 | Status: SHIPPED | OUTPATIENT
Start: 2020-06-29 | End: 2020-09-28

## 2020-06-29 RX ORDER — METFORMIN HYDROCHLORIDE 750 MG/1
1500 TABLET, EXTENDED RELEASE ORAL
Qty: 180 TABLET | Refills: 0 | Status: SHIPPED | OUTPATIENT
Start: 2020-06-29 | End: 2020-09-28

## 2020-06-29 NOTE — TELEPHONE ENCOUNTER
BISOPROLOL-HYDROCHLOROTHIAZIDE 10-6.25 MG Oral Tab    LISINOPRIL 20 MG Oral Tab    METFORMIN HCL  MG Oral Tablet 24 Hr - PATIENT WOULD LIKE TO SPEAK WITH A NURSE REGARDING THIS MEDICATION AND THE RECALL.      [facility] [id number]

## 2020-06-29 NOTE — TELEPHONE ENCOUNTER
Patient said that she received something from [employer] about the Metformin being recalled. The pharmacist said that there are only certain brands of the Metformin that are being recalled.  He said that this dose should be available right now and patient can

## 2020-08-12 DIAGNOSIS — M17.0 PRIMARY OSTEOARTHRITIS OF BOTH KNEES: ICD-10-CM

## 2020-08-12 RX ORDER — MELOXICAM 15 MG/1
15 TABLET ORAL DAILY
Qty: 90 TABLET | Refills: 0 | Status: SHIPPED | OUTPATIENT
Start: 2020-08-12 | End: 2020-11-07

## 2020-08-12 NOTE — TELEPHONE ENCOUNTER
Last refill #90 on 5/12/2020  Last office visit on 6/18/2020  Future Appointments   Date Time Provider Trista Dunn   11/30/2020 10:30 AM Zev Garcia APRN [facility] [place] [facility] [place]

## 2020-08-26 RX ORDER — ROSUVASTATIN CALCIUM 10 MG/1
TABLET, COATED ORAL
Qty: 90 TABLET | Refills: 0 | Status: SHIPPED | OUTPATIENT
Start: 2020-08-26 | End: 2020-11-23

## 2020-08-26 RX ORDER — GLIMEPIRIDE 2 MG/1
TABLET ORAL
Qty: 90 TABLET | Refills: 0 | Status: SHIPPED | OUTPATIENT
Start: 2020-08-26 | End: 2020-11-23

## 2020-08-26 NOTE — TELEPHONE ENCOUNTER
LOV 6/18/20    LAST LAB routine labs 3/4/20, repeated hgba1c 6/18/20    LAST RX both ordered 6/1/20 x 90 days/orf    Next OV None scheduled    PROTOCOL

## 2020-09-26 DIAGNOSIS — I10 ESSENTIAL HYPERTENSION WITH GOAL BLOOD PRESSURE LESS THAN 140/90: ICD-10-CM

## 2020-09-26 DIAGNOSIS — E11.9 TYPE 2 DIABETES MELLITUS WITHOUT COMPLICATION, WITHOUT LONG-TERM CURRENT USE OF INSULIN (HCC): ICD-10-CM

## 2020-09-26 DIAGNOSIS — E78.00 HYPERCHOLESTEREMIA: ICD-10-CM

## 2020-09-28 RX ORDER — METFORMIN HYDROCHLORIDE 750 MG/1
TABLET, EXTENDED RELEASE ORAL
Qty: 180 TABLET | Refills: 0 | Status: SHIPPED | OUTPATIENT
Start: 2020-09-28 | End: 2020-12-24

## 2020-09-28 RX ORDER — BISOPROLOL FUMARATE AND HYDROCHLOROTHIAZIDE 6.25; 1 MG/1; MG/1
2 TABLET ORAL DAILY
Qty: 180 TABLET | Refills: 0 | Status: SHIPPED | OUTPATIENT
Start: 2020-09-28 | End: 2020-12-24

## 2020-09-28 RX ORDER — LISINOPRIL 20 MG/1
TABLET ORAL
Qty: 90 TABLET | Refills: 0 | Status: SHIPPED | OUTPATIENT
Start: 2020-09-28 | End: 2020-12-24

## 2020-09-28 NOTE — TELEPHONE ENCOUNTER
Last refill on metformin #180 on 6/29/2020  Last refill on lisinopril #90 on 6/29/2020  Last refill on bisoprolol/hctz #180 on 6/29/2020  Last office visit on 6/18/2020  Future Appointments   Date Time Provider Trista Dunn   11/30/2020 10:30 AM Jesusita

## 2020-11-07 DIAGNOSIS — M17.0 PRIMARY OSTEOARTHRITIS OF BOTH KNEES: ICD-10-CM

## 2020-11-07 RX ORDER — MELOXICAM 15 MG/1
TABLET ORAL
Qty: 90 TABLET | Refills: 0 | Status: SHIPPED | OUTPATIENT
Start: 2020-11-07 | End: 2021-02-05

## 2020-11-07 NOTE — TELEPHONE ENCOUNTER
Last OV: 6/18/20  Last refill: 8/12/20 #90 Tablets w/ 0 refills  Requested Prescriptions     Pending Prescriptions Disp Refills   • MELOXICAM 15 MG Oral Tab [Pharmacy Med Name: MELOXICAM 15MG TABLETS] 90 tablet 0     Sig: TAKE 1 TABLET(15 MG) BY MOUTH KAVYA

## 2020-11-16 ENCOUNTER — TELEPHONE (OUTPATIENT)
Dept: FAMILY MEDICINE CLINIC | Facility: CLINIC | Age: 65
End: 2020-11-16

## 2020-11-21 RX ORDER — ROSUVASTATIN CALCIUM 10 MG/1
TABLET, COATED ORAL
Qty: 90 TABLET | Refills: 0 | Status: CANCELLED | OUTPATIENT
Start: 2020-11-21

## 2020-11-21 RX ORDER — GLIMEPIRIDE 2 MG/1
TABLET ORAL
Qty: 90 TABLET | Refills: 0 | Status: CANCELLED | OUTPATIENT
Start: 2020-11-21

## 2020-11-23 RX ORDER — GLIMEPIRIDE 2 MG/1
TABLET ORAL
Qty: 90 TABLET | Refills: 0 | Status: SHIPPED | OUTPATIENT
Start: 2020-11-23 | End: 2021-02-18

## 2020-11-23 RX ORDER — ROSUVASTATIN CALCIUM 10 MG/1
TABLET, COATED ORAL
Qty: 90 TABLET | Refills: 0 | Status: SHIPPED | OUTPATIENT
Start: 2020-11-23 | End: 2021-02-18

## 2020-11-25 NOTE — PROGRESS NOTES
[address]  SLEEP PROGRESS NOTE    Ligia Ruiz  verbally consents to a Virtual/Telephone Check-In service on 11/25/2020.     Patient understands and accepts financial responsibility for any deductible, co-insurance and/or co-pa Sleep Apnea Risk - -         Past Medical History:   Diagnosis Date   • Essential hypertension    • Type II or unspecified type diabetes mellitus without mention of complication, not stated as uncontrolled     20012     Past Surgical History:   Procedure L (hard of hearing)     Hypercholesteremia     Cerumen impaction     Essential hypertension     Morbid obesity with BMI of 40.0-44.9, adult (HCC)     Obstructive sleep apnea      REVIEW OF SYSTEMS:   Review of Systems   Constitutional: Negative.     HENT: Neg .      Advised patient to change filters,masks,hoses  and tubes and equiptment on a  regular schedule.   Filters and seals shall be changed every 1 month,  Hoses every 3 months,   CPAP mask and humidifier  chamber changed every 6 month  with the Durable med

## 2020-11-30 ENCOUNTER — TELEMEDICINE (OUTPATIENT)
Dept: FAMILY MEDICINE CLINIC | Facility: CLINIC | Age: 65
End: 2020-11-30
Payer: MEDICARE

## 2020-11-30 DIAGNOSIS — G47.33 OBSTRUCTIVE SLEEP APNEA: Primary | ICD-10-CM

## 2020-11-30 PROCEDURE — 99442 PHONE E/M BY PHYS 11-20 MIN: CPT | Performed by: NURSE PRACTITIONER

## 2020-11-30 NOTE — PROGRESS NOTES
[facility]  [facility]        HPI:   This is a 72year old female coming in for Patient presents with:  Obstructive Sleep Apnea (SARBJIT)      HPI:     Phone visit done with patient after unable to connect on video.   Patient states mention of complication, not stated as uncontrolled          Past Surgical History:   Procedure Laterality Date   • ADENOIDECTOMY     • CHOLECYSTECTOMY     •      • TONSILLECTOMY       Social History:  Social History    Social History Narrative Obstructive sleep apnea      REVIEW OF SYSTEMS:   Review of Systems   Constitutional: Negative. HENT: Negative. Eyes: Negative. Respiratory: Negative. Cardiovascular: Negative. Musculoskeletal: Negative. Skin: Negative.     Neurological: N month,  Hoses every 3 months,   CPAP mask and humidifier  chamber changed every 6 month  with the Durable medical equipment provider.          Advised if still with sleep apnea and not using CPAP has a  7 fold increase in risk of heart attack, stroke, abnor

## 2020-12-24 DIAGNOSIS — E11.9 TYPE 2 DIABETES MELLITUS WITHOUT COMPLICATION, WITHOUT LONG-TERM CURRENT USE OF INSULIN (HCC): ICD-10-CM

## 2020-12-24 DIAGNOSIS — I10 ESSENTIAL HYPERTENSION WITH GOAL BLOOD PRESSURE LESS THAN 140/90: ICD-10-CM

## 2020-12-24 DIAGNOSIS — E78.00 HYPERCHOLESTEREMIA: ICD-10-CM

## 2020-12-24 RX ORDER — BISOPROLOL FUMARATE AND HYDROCHLOROTHIAZIDE 6.25; 1 MG/1; MG/1
2 TABLET ORAL DAILY
Qty: 180 TABLET | Refills: 0 | Status: SHIPPED | OUTPATIENT
Start: 2020-12-24 | End: 2021-03-18

## 2020-12-24 RX ORDER — METFORMIN HYDROCHLORIDE 750 MG/1
1500 TABLET, EXTENDED RELEASE ORAL
Qty: 180 TABLET | Refills: 0 | Status: SHIPPED | OUTPATIENT
Start: 2020-12-24 | End: 2021-03-18

## 2020-12-24 RX ORDER — LISINOPRIL 20 MG/1
20 TABLET ORAL DAILY
Qty: 90 TABLET | Refills: 0 | Status: SHIPPED | OUTPATIENT
Start: 2020-12-24 | End: 2021-03-18

## 2020-12-24 NOTE — TELEPHONE ENCOUNTER
Diabetic Medication Protocol [id number]/24/2020 12:02 PM   HgBA1C procedure resulted in past 6 months Protocol Details    Last HgBA1C < 7.5     Appointment in past 6 or next 3 months     Microalbumin procedure in past 12 months or taking ACE/ARB      Last OV

## 2021-01-21 ENCOUNTER — TELEPHONE (OUTPATIENT)
Dept: FAMILY MEDICINE CLINIC | Facility: CLINIC | Age: 66
End: 2021-01-21

## 2021-01-21 DIAGNOSIS — E78.00 HYPERCHOLESTEREMIA: ICD-10-CM

## 2021-01-21 DIAGNOSIS — E11.9 TYPE 2 DIABETES MELLITUS WITHOUT COMPLICATION, WITHOUT LONG-TERM CURRENT USE OF INSULIN (HCC): Primary | ICD-10-CM

## 2021-01-21 DIAGNOSIS — I10 ESSENTIAL HYPERTENSION WITH GOAL BLOOD PRESSURE LESS THAN 140/90: ICD-10-CM

## 2021-01-28 ENCOUNTER — LAB ENCOUNTER (OUTPATIENT)
Dept: LAB | Age: 66
End: 2021-01-28
Attending: INTERNAL MEDICINE
Payer: MEDICARE

## 2021-01-28 DIAGNOSIS — E11.9 TYPE 2 DIABETES MELLITUS WITHOUT COMPLICATION, WITHOUT LONG-TERM CURRENT USE OF INSULIN (HCC): ICD-10-CM

## 2021-01-28 DIAGNOSIS — E78.00 HYPERCHOLESTEREMIA: ICD-10-CM

## 2021-01-28 DIAGNOSIS — I10 ESSENTIAL HYPERTENSION WITH GOAL BLOOD PRESSURE LESS THAN 140/90: ICD-10-CM

## 2021-01-28 LAB
ALBUMIN SERPL-MCNC: 3.8 G/DL (ref 3.4–5)
ALBUMIN/GLOB SERPL: 1.1 {RATIO} (ref 1–2)
ALP LIVER SERPL-CCNC: 62 U/L
ALT SERPL-CCNC: 52 U/L
ANION GAP SERPL CALC-SCNC: 5 MMOL/L (ref 0–18)
AST SERPL-CCNC: 29 U/L (ref 15–37)
BASOPHILS # BLD AUTO: 0.04 X10(3) UL (ref 0–0.2)
BASOPHILS NFR BLD AUTO: 0.5 %
BILIRUB SERPL-MCNC: 0.8 MG/DL (ref 0.1–2)
BUN BLD-MCNC: 14 MG/DL (ref 7–18)
BUN/CREAT SERPL: 16.7 (ref 10–20)
CALCIUM BLD-MCNC: 9.4 MG/DL (ref 8.5–10.1)
CHLORIDE SERPL-SCNC: 106 MMOL/L (ref 98–112)
CHOLEST SMN-MCNC: 141 MG/DL (ref ?–200)
CO2 SERPL-SCNC: 29 MMOL/L (ref 21–32)
CREAT BLD-MCNC: 0.84 MG/DL
CREAT UR-SCNC: 88 MG/DL
DEPRECATED RDW RBC AUTO: 46.5 FL (ref 35.1–46.3)
EOSINOPHIL # BLD AUTO: 0.22 X10(3) UL (ref 0–0.7)
EOSINOPHIL NFR BLD AUTO: 2.9 %
ERYTHROCYTE [DISTWIDTH] IN BLOOD BY AUTOMATED COUNT: 13 % (ref 11–15)
EST. AVERAGE GLUCOSE BLD GHB EST-MCNC: 148 MG/DL (ref 68–126)
GLOBULIN PLAS-MCNC: 3.5 G/DL (ref 2.8–4.4)
GLUCOSE BLD-MCNC: 170 MG/DL (ref 70–99)
HBA1C MFR BLD HPLC: 6.8 % (ref ?–5.7)
HCT VFR BLD AUTO: 42.1 %
HDLC SERPL-MCNC: 67 MG/DL (ref 40–59)
HGB BLD-MCNC: 13.1 G/DL
IMM GRANULOCYTES # BLD AUTO: 0.02 X10(3) UL (ref 0–1)
IMM GRANULOCYTES NFR BLD: 0.3 %
LDLC SERPL CALC-MCNC: 40 MG/DL (ref ?–100)
LYMPHOCYTES # BLD AUTO: 2.19 X10(3) UL (ref 1–4)
LYMPHOCYTES NFR BLD AUTO: 28.9 %
M PROTEIN MFR SERPL ELPH: 7.3 G/DL (ref 6.4–8.2)
MCH RBC QN AUTO: 30.1 PG (ref 26–34)
MCHC RBC AUTO-ENTMCNC: 31.1 G/DL (ref 31–37)
MCV RBC AUTO: 96.8 FL
MICROALBUMIN UR-MCNC: 3.04 MG/DL
MICROALBUMIN/CREAT 24H UR-RTO: 34.5 UG/MG (ref ?–30)
MONOCYTES # BLD AUTO: 0.6 X10(3) UL (ref 0.1–1)
MONOCYTES NFR BLD AUTO: 7.9 %
NEUTROPHILS # BLD AUTO: 4.5 X10 (3) UL (ref 1.5–7.7)
NEUTROPHILS # BLD AUTO: 4.5 X10(3) UL (ref 1.5–7.7)
NEUTROPHILS NFR BLD AUTO: 59.5 %
NONHDLC SERPL-MCNC: 74 MG/DL (ref ?–130)
OSMOLALITY SERPL CALC.SUM OF ELEC: 294 MOSM/KG (ref 275–295)
PATIENT FASTING Y/N/NP: YES
PATIENT FASTING Y/N/NP: YES
PLATELET # BLD AUTO: 228 10(3)UL (ref 150–450)
POTASSIUM SERPL-SCNC: 4.4 MMOL/L (ref 3.5–5.1)
RBC # BLD AUTO: 4.35 X10(6)UL
SODIUM SERPL-SCNC: 140 MMOL/L (ref 136–145)
TRIGL SERPL-MCNC: 171 MG/DL (ref 30–149)
VLDLC SERPL CALC-MCNC: 34 MG/DL (ref 0–30)
WBC # BLD AUTO: 7.6 X10(3) UL (ref 4–11)

## 2021-01-28 PROCEDURE — 85025 COMPLETE CBC W/AUTO DIFF WBC: CPT

## 2021-01-28 PROCEDURE — 83036 HEMOGLOBIN GLYCOSYLATED A1C: CPT

## 2021-01-28 PROCEDURE — 80061 LIPID PANEL: CPT

## 2021-01-28 PROCEDURE — 80053 COMPREHEN METABOLIC PANEL: CPT

## 2021-01-28 PROCEDURE — 82570 ASSAY OF URINE CREATININE: CPT

## 2021-01-28 PROCEDURE — 36415 COLL VENOUS BLD VENIPUNCTURE: CPT

## 2021-01-28 PROCEDURE — 82043 UR ALBUMIN QUANTITATIVE: CPT

## 2021-02-02 DIAGNOSIS — Z23 NEED FOR VACCINATION: ICD-10-CM

## 2021-02-05 DIAGNOSIS — M17.0 PRIMARY OSTEOARTHRITIS OF BOTH KNEES: ICD-10-CM

## 2021-02-06 ENCOUNTER — OFFICE VISIT (OUTPATIENT)
Dept: FAMILY MEDICINE CLINIC | Facility: CLINIC | Age: 66
End: 2021-02-06
Payer: MEDICARE

## 2021-02-06 VITALS
HEIGHT: 61 IN | TEMPERATURE: 98 F | WEIGHT: 243 LBS | HEART RATE: 68 BPM | DIASTOLIC BLOOD PRESSURE: 80 MMHG | SYSTOLIC BLOOD PRESSURE: 132 MMHG | RESPIRATION RATE: 20 BRPM | BODY MASS INDEX: 45.88 KG/M2 | OXYGEN SATURATION: 96 %

## 2021-02-06 DIAGNOSIS — Z97.4 WEARS HEARING AID: ICD-10-CM

## 2021-02-06 DIAGNOSIS — H61.22 EXCESSIVE CERUMEN IN LEFT EAR CANAL: Primary | ICD-10-CM

## 2021-02-06 PROCEDURE — 69210 REMOVE IMPACTED EAR WAX UNI: CPT | Performed by: NURSE PRACTITIONER

## 2021-02-06 RX ORDER — MELOXICAM 15 MG/1
15 TABLET ORAL DAILY
Qty: 90 TABLET | Refills: 0 | Status: SHIPPED | OUTPATIENT
Start: 2021-02-06 | End: 2021-05-04

## 2021-02-06 NOTE — PROGRESS NOTES
HPI: HPI   Patient is here to have her ears checked. Last night she thinks she may have lost a piece of her hearing aid in her ear canal. She also gets a lot of ear wax and wants to make sure her ear canals are clear.     Current Outpatient Medications kg/m²   Physical Exam    HENT:   Right Ear: Hearing, tympanic membrane and ear canal normal.   Hearing aid tip lodged deep in ear canal, attempted removal with cerumen spoon and irrigation but was unsuccessful.  Using small forceps, able to grasp and remove

## 2021-02-12 ENCOUNTER — TELEPHONE (OUTPATIENT)
Dept: FAMILY MEDICINE CLINIC | Facility: CLINIC | Age: 66
End: 2021-02-12

## 2021-02-12 ENCOUNTER — OFFICE VISIT (OUTPATIENT)
Dept: FAMILY MEDICINE CLINIC | Facility: CLINIC | Age: 66
End: 2021-02-12
Payer: MEDICARE

## 2021-02-12 VITALS
TEMPERATURE: 97 F | WEIGHT: 245.25 LBS | OXYGEN SATURATION: 96 % | RESPIRATION RATE: 18 BRPM | DIASTOLIC BLOOD PRESSURE: 78 MMHG | SYSTOLIC BLOOD PRESSURE: 136 MMHG | BODY MASS INDEX: 46.3 KG/M2 | HEART RATE: 68 BPM | HEIGHT: 61 IN

## 2021-02-12 DIAGNOSIS — H60.392 OTHER INFECTIVE ACUTE OTITIS EXTERNA OF LEFT EAR: Primary | ICD-10-CM

## 2021-02-12 PROCEDURE — 99213 OFFICE O/P EST LOW 20 MIN: CPT | Performed by: NURSE PRACTITIONER

## 2021-02-12 RX ORDER — OFLOXACIN 3 MG/ML
10 SOLUTION AURICULAR (OTIC) DAILY
Qty: 1 BOTTLE | Refills: 0 | Status: SHIPPED | OUTPATIENT
Start: 2021-02-12

## 2021-02-12 NOTE — TELEPHONE ENCOUNTER
Patient was in on 2/6/2021. Patient had had some clear discharge draining  from the ear since Saturday. She washed her hair yesterday and since, she has had pain in the ear that was flushed. She is also hearing a \"clicking\" sound.   Patient believes it

## 2021-02-12 NOTE — PROGRESS NOTES
HPI:   Ear Pain   There is pain in the left ear. This is a new problem. Episode onset: last few days. The problem has been gradually worsening. There has been no fever.  Associated symptoms include ear discharge (clear) and rhinorrhea (slightly last 2 days, Constitutional: Negative for chills. HENT: Positive for ear discharge (clear), ear pain, rhinorrhea (slightly last 2 days, none today) and sneezing. Respiratory: Negative for cough and shortness of breath.     Cardiovascular: Negative for chest pain

## 2021-02-12 NOTE — TELEPHONE ENCOUNTER
Future Appointments   Date Time Provider Trista Dunn   2/12/2021 10:30 AM ZENON Teague EMGSW [facility], [facility].     Appointment scheduled.

## 2021-02-12 NOTE — TELEPHONE ENCOUNTER
Patient was in and seen Eusebia Mcclure NP for part of her hearing aid was stuck in her ear. Patient thnks she now has an infection in it. Would like to know if Mary or Dr Divina Lucas could prescribe her some ear drops or some ABX for it? Please call patient to advise.

## 2021-02-18 DIAGNOSIS — E11.9 TYPE 2 DIABETES MELLITUS WITHOUT COMPLICATION, WITHOUT LONG-TERM CURRENT USE OF INSULIN (HCC): Primary | ICD-10-CM

## 2021-02-18 DIAGNOSIS — E78.00 HYPERCHOLESTEREMIA: ICD-10-CM

## 2021-02-18 RX ORDER — ROSUVASTATIN CALCIUM 10 MG/1
10 TABLET, COATED ORAL NIGHTLY
Qty: 90 TABLET | Refills: 1 | Status: SHIPPED | OUTPATIENT
Start: 2021-02-18 | End: 2021-08-10

## 2021-02-18 RX ORDER — GLIMEPIRIDE 2 MG/1
2 TABLET ORAL
Qty: 90 TABLET | Refills: 1 | Status: SHIPPED | OUTPATIENT
Start: 2021-02-18 | End: 2021-08-10

## 2021-03-15 ENCOUNTER — PATIENT OUTREACH (OUTPATIENT)
Dept: FAMILY MEDICINE CLINIC | Facility: CLINIC | Age: 66
End: 2021-03-15

## 2021-03-18 DIAGNOSIS — E78.00 HYPERCHOLESTEREMIA: ICD-10-CM

## 2021-03-18 DIAGNOSIS — I10 ESSENTIAL HYPERTENSION WITH GOAL BLOOD PRESSURE LESS THAN 140/90: ICD-10-CM

## 2021-03-18 DIAGNOSIS — E11.9 TYPE 2 DIABETES MELLITUS WITHOUT COMPLICATION, WITHOUT LONG-TERM CURRENT USE OF INSULIN (HCC): ICD-10-CM

## 2021-03-18 NOTE — TELEPHONE ENCOUNTER
Last refill on metformin #180 on 12/24/2020  Last refill on bisoprolol/hctz #90 on 12/24/2020  Last refill on lisinopril #90 on 12/24/2020    Last office visit pertaining to refill on 6/18/2020  No future appointments.   BP Readings from Last 3 Encounters:

## 2021-03-21 RX ORDER — METFORMIN HYDROCHLORIDE 750 MG/1
1500 TABLET, EXTENDED RELEASE ORAL
Qty: 180 TABLET | Refills: 0 | Status: SHIPPED | OUTPATIENT
Start: 2021-03-21 | End: 2021-06-15

## 2021-03-21 RX ORDER — BISOPROLOL FUMARATE AND HYDROCHLOROTHIAZIDE 6.25; 1 MG/1; MG/1
2 TABLET ORAL DAILY
Qty: 180 TABLET | Refills: 0 | Status: SHIPPED | OUTPATIENT
Start: 2021-03-21 | End: 2021-06-15

## 2021-03-21 RX ORDER — LISINOPRIL 20 MG/1
20 TABLET ORAL DAILY
Qty: 90 TABLET | Refills: 0 | Status: SHIPPED | OUTPATIENT
Start: 2021-03-21 | End: 2021-06-15

## 2021-05-04 DIAGNOSIS — M17.0 PRIMARY OSTEOARTHRITIS OF BOTH KNEES: ICD-10-CM

## 2021-05-04 RX ORDER — MELOXICAM 15 MG/1
TABLET ORAL
Qty: 90 TABLET | Refills: 0 | Status: SHIPPED | OUTPATIENT
Start: 2021-05-04 | End: 2021-07-30

## 2021-05-13 ENCOUNTER — OFFICE VISIT (OUTPATIENT)
Dept: FAMILY MEDICINE CLINIC | Facility: CLINIC | Age: 66
End: 2021-05-13
Payer: MEDICARE

## 2021-05-13 VITALS
HEIGHT: 61 IN | TEMPERATURE: 98 F | OXYGEN SATURATION: 97 % | DIASTOLIC BLOOD PRESSURE: 82 MMHG | SYSTOLIC BLOOD PRESSURE: 126 MMHG | RESPIRATION RATE: 18 BRPM | BODY MASS INDEX: 46.71 KG/M2 | HEART RATE: 64 BPM | WEIGHT: 247.38 LBS

## 2021-05-13 DIAGNOSIS — G47.33 OBSTRUCTIVE SLEEP APNEA: Primary | ICD-10-CM

## 2021-05-13 PROCEDURE — 99214 OFFICE O/P EST MOD 30 MIN: CPT | Performed by: FAMILY MEDICINE

## 2021-05-13 NOTE — PROGRESS NOTES
[facility]  [facility]        HPI:   This is a 72year old female coming in for Patient presents with: Follow - Up  Obstructive Sleep Apnea (SARBJIT): 6 months       HPI: she is doing well and sleeping better with cpap.    She notes found for: PEDRO LUIS  No results found for: VITD, QVITD, VITD25, LUHS56IK  No results found for: B12, VITB12      Past Medical History:   Diagnosis Date   • Essential hypertension    • Type II or unspecified type diabetes mellitus without mention of complication • latanoprost 0.005 % Ophthalmic Solution Place 1 drop into both eyes nightly.         Counseling given: Not Answered         Problem List:  Patient Active Problem List:     Type 2 diabetes mellitus without complication ([address]     [place] (hard of hearing) external ear normal.      Left Ear: Tympanic membrane and external ear normal.      Ears:      Comments: Left cerumenosis. Bilateral hearing aids removed.         Nose: Nose normal.      Mouth/Throat:      Mouth: Mucous membranes are moist.      Comments: provider. Independent interpretation of Sleep Download as defined above. Continue with Rx management of Sleep apnea with Pap therapy.         Meds & Refills for this Visit:  Requested Prescriptions      No prescriptions requested or ordered in this enc

## 2021-05-18 ENCOUNTER — PATIENT OUTREACH (OUTPATIENT)
Dept: FAMILY MEDICINE CLINIC | Facility: CLINIC | Age: 66
End: 2021-05-18

## 2021-06-15 DIAGNOSIS — E78.00 HYPERCHOLESTEREMIA: ICD-10-CM

## 2021-06-15 DIAGNOSIS — E11.9 TYPE 2 DIABETES MELLITUS WITHOUT COMPLICATION, WITHOUT LONG-TERM CURRENT USE OF INSULIN (HCC): ICD-10-CM

## 2021-06-15 DIAGNOSIS — I10 ESSENTIAL HYPERTENSION WITH GOAL BLOOD PRESSURE LESS THAN 140/90: ICD-10-CM

## 2021-06-15 RX ORDER — METFORMIN HYDROCHLORIDE 750 MG/1
TABLET, EXTENDED RELEASE ORAL
Qty: 180 TABLET | Refills: 0 | Status: SHIPPED | OUTPATIENT
Start: 2021-06-15 | End: 2021-08-18

## 2021-06-15 RX ORDER — LISINOPRIL 20 MG/1
TABLET ORAL
Qty: 90 TABLET | Refills: 0 | Status: SHIPPED | OUTPATIENT
Start: 2021-06-15 | End: 2021-08-18

## 2021-06-15 RX ORDER — BISOPROLOL FUMARATE AND HYDROCHLOROTHIAZIDE 6.25; 1 MG/1; MG/1
2 TABLET ORAL DAILY
Qty: 180 TABLET | Refills: 0 | Status: SHIPPED | OUTPATIENT
Start: 2021-06-15 | End: 2021-09-11

## 2021-06-15 NOTE — TELEPHONE ENCOUNTER
LOV 02/12/2021-erasmo    LAST LAB 01/28/2021    LAST RX  Lisinopril #90 R0 03/21/2021  Metformin #180 R0 03/21/2021  Bisoprolol #180 R0 03/21/2021     Next OV   Future Appointments   Date Time Provider Trista Dunn   11/16/2021 10:50 AM Nury Butt

## 2021-07-15 ENCOUNTER — TELEPHONE (OUTPATIENT)
Dept: FAMILY MEDICINE CLINIC | Facility: CLINIC | Age: 66
End: 2021-07-15

## 2021-07-15 NOTE — TELEPHONE ENCOUNTER
Patient advised regarding recall and Dr. David Elder statement on continuing CPAP use, stopping Soclean system, inserting HEPA filter. Patient has already registered machine and contacted DME.

## 2021-07-30 DIAGNOSIS — M17.0 PRIMARY OSTEOARTHRITIS OF BOTH KNEES: ICD-10-CM

## 2021-07-30 RX ORDER — MELOXICAM 15 MG/1
15 TABLET ORAL DAILY
Qty: 90 TABLET | Refills: 3 | Status: SHIPPED | OUTPATIENT
Start: 2021-07-30 | End: 2022-07-26

## 2021-08-10 DIAGNOSIS — Z78.0 POSTMENOPAUSAL: Primary | ICD-10-CM

## 2021-08-10 DIAGNOSIS — Z12.31 ENCOUNTER FOR SCREENING MAMMOGRAM FOR BREAST CANCER: ICD-10-CM

## 2021-08-10 DIAGNOSIS — I10 HYPERTENSION, UNSPECIFIED TYPE: ICD-10-CM

## 2021-08-10 DIAGNOSIS — Z12.31 ENCOUNTER FOR SCREENING MAMMOGRAM FOR MALIGNANT NEOPLASM OF BREAST: ICD-10-CM

## 2021-08-10 DIAGNOSIS — E11.9 TYPE 2 DIABETES MELLITUS WITHOUT COMPLICATION, WITHOUT LONG-TERM CURRENT USE OF INSULIN (HCC): ICD-10-CM

## 2021-08-10 DIAGNOSIS — E78.00 HYPERCHOLESTEREMIA: ICD-10-CM

## 2021-08-10 RX ORDER — ROSUVASTATIN CALCIUM 10 MG/1
TABLET, COATED ORAL
Qty: 90 TABLET | Refills: 1 | Status: SHIPPED | OUTPATIENT
Start: 2021-08-10 | End: 2022-02-03

## 2021-08-10 RX ORDER — GLIMEPIRIDE 2 MG/1
TABLET ORAL
Qty: 90 TABLET | Refills: 1 | Status: SHIPPED | OUTPATIENT
Start: 2021-08-10 | End: 2021-08-18

## 2021-08-10 NOTE — TELEPHONE ENCOUNTER
Cholesterol Medication Protocol Yobfkj38/10/2021 09:07 AM   ALT < 80 Protocol Details    ALT resulted within past year     Lipid panel within past 12 months     Appointment within past 12 or next 3 months      Last refill - 2/18/21 - #90 with 1 refill  Last ALT - 1/28/21 - 52  Last lipid panel - 1/28/21  Last office visit - 3/15/21    Diabetic Medication Protocol Hmuxhi98/10/2021 12:56 PM   HgBA1C procedure resulted in past 6 months Protocol Details    Last HgBA1C < 7.5     Microalbumin procedure in past 12 months or taking ACE/ARB     Appointment in past 6 or next 3 months      Last refill of Glimepiride - 2/18/21 - #90 with 1 refill  Last A1c - 1/28/21 - 6.8  Last microalbumin - 1/28/21  Last office visit - 3/15/21

## 2021-08-17 ENCOUNTER — LAB ENCOUNTER (OUTPATIENT)
Dept: LAB | Age: 66
End: 2021-08-17
Attending: INTERNAL MEDICINE
Payer: MEDICARE

## 2021-08-17 ENCOUNTER — OFFICE VISIT (OUTPATIENT)
Dept: FAMILY MEDICINE CLINIC | Facility: CLINIC | Age: 66
End: 2021-08-17
Payer: MEDICARE

## 2021-08-17 VITALS
TEMPERATURE: 98 F | OXYGEN SATURATION: 97 % | HEART RATE: 67 BPM | HEIGHT: 61 IN | DIASTOLIC BLOOD PRESSURE: 74 MMHG | SYSTOLIC BLOOD PRESSURE: 130 MMHG | BODY MASS INDEX: 45.97 KG/M2 | RESPIRATION RATE: 18 BRPM | WEIGHT: 243.5 LBS

## 2021-08-17 DIAGNOSIS — Z11.59 NEED FOR HEPATITIS C SCREENING TEST: ICD-10-CM

## 2021-08-17 DIAGNOSIS — G47.33 OBSTRUCTIVE SLEEP APNEA: ICD-10-CM

## 2021-08-17 DIAGNOSIS — E11.9 TYPE 2 DIABETES MELLITUS WITHOUT COMPLICATION, WITHOUT LONG-TERM CURRENT USE OF INSULIN (HCC): Primary | ICD-10-CM

## 2021-08-17 DIAGNOSIS — E11.9 TYPE 2 DIABETES MELLITUS WITHOUT COMPLICATION, WITHOUT LONG-TERM CURRENT USE OF INSULIN (HCC): ICD-10-CM

## 2021-08-17 DIAGNOSIS — I10 HYPERTENSION, UNSPECIFIED TYPE: ICD-10-CM

## 2021-08-17 DIAGNOSIS — E78.00 HYPERCHOLESTEREMIA: ICD-10-CM

## 2021-08-17 DIAGNOSIS — Z00.00 ENCOUNTER FOR ANNUAL HEALTH EXAMINATION: ICD-10-CM

## 2021-08-17 LAB
ALBUMIN SERPL-MCNC: 3.9 G/DL (ref 3.4–5)
ALBUMIN/GLOB SERPL: 1.2 {RATIO} (ref 1–2)
ALP LIVER SERPL-CCNC: 56 U/L
ALT SERPL-CCNC: 54 U/L
ANION GAP SERPL CALC-SCNC: 5 MMOL/L (ref 0–18)
AST SERPL-CCNC: 27 U/L (ref 15–37)
BASOPHILS # BLD AUTO: 0.05 X10(3) UL (ref 0–0.2)
BASOPHILS NFR BLD AUTO: 0.8 %
BILIRUB SERPL-MCNC: 0.9 MG/DL (ref 0.1–2)
BUN BLD-MCNC: 13 MG/DL (ref 7–18)
CALCIUM BLD-MCNC: 9.7 MG/DL (ref 8.5–10.1)
CHLORIDE SERPL-SCNC: 103 MMOL/L (ref 98–112)
CHOLEST SMN-MCNC: 143 MG/DL (ref ?–200)
CO2 SERPL-SCNC: 30 MMOL/L (ref 21–32)
CREAT BLD-MCNC: 0.79 MG/DL
CREAT UR-SCNC: 66 MG/DL
EOSINOPHIL # BLD AUTO: 0.27 X10(3) UL (ref 0–0.7)
EOSINOPHIL NFR BLD AUTO: 4.1 %
ERYTHROCYTE [DISTWIDTH] IN BLOOD BY AUTOMATED COUNT: 13 %
EST. AVERAGE GLUCOSE BLD GHB EST-MCNC: 169 MG/DL (ref 68–126)
GLOBULIN PLAS-MCNC: 3.3 G/DL (ref 2.8–4.4)
GLUCOSE BLD-MCNC: 179 MG/DL (ref 70–99)
HBA1C MFR BLD HPLC: 7.5 % (ref ?–5.7)
HCT VFR BLD AUTO: 39.7 %
HCV AB SERPL QL IA: NONREACTIVE
HDLC SERPL-MCNC: 58 MG/DL (ref 40–59)
HGB BLD-MCNC: 12.9 G/DL
IMM GRANULOCYTES # BLD AUTO: 0.02 X10(3) UL (ref 0–1)
IMM GRANULOCYTES NFR BLD: 0.3 %
LDLC SERPL CALC-MCNC: 59 MG/DL (ref ?–100)
LYMPHOCYTES # BLD AUTO: 1.99 X10(3) UL (ref 1–4)
LYMPHOCYTES NFR BLD AUTO: 30.2 %
M PROTEIN MFR SERPL ELPH: 7.2 G/DL (ref 6.4–8.2)
MCH RBC QN AUTO: 30.1 PG (ref 26–34)
MCHC RBC AUTO-ENTMCNC: 32.5 G/DL (ref 31–37)
MCV RBC AUTO: 92.8 FL
MICROALBUMIN UR-MCNC: 1.23 MG/DL
MICROALBUMIN/CREAT 24H UR-RTO: 18.6 UG/MG (ref ?–30)
MONOCYTES # BLD AUTO: 0.5 X10(3) UL (ref 0.1–1)
MONOCYTES NFR BLD AUTO: 7.6 %
NEUTROPHILS # BLD AUTO: 3.76 X10 (3) UL (ref 1.5–7.7)
NEUTROPHILS # BLD AUTO: 3.76 X10(3) UL (ref 1.5–7.7)
NEUTROPHILS NFR BLD AUTO: 57 %
NONHDLC SERPL-MCNC: 85 MG/DL (ref ?–130)
OSMOLALITY SERPL CALC.SUM OF ELEC: 291 MOSM/KG (ref 275–295)
PATIENT FASTING Y/N/NP: YES
PATIENT FASTING Y/N/NP: YES
PLATELET # BLD AUTO: 215 10(3)UL (ref 150–450)
POTASSIUM SERPL-SCNC: 4 MMOL/L (ref 3.5–5.1)
RBC # BLD AUTO: 4.28 X10(6)UL
SODIUM SERPL-SCNC: 138 MMOL/L (ref 136–145)
TRIGL SERPL-MCNC: 152 MG/DL (ref 30–149)
VLDLC SERPL CALC-MCNC: 22 MG/DL (ref 0–30)
WBC # BLD AUTO: 6.6 X10(3) UL (ref 4–11)

## 2021-08-17 PROCEDURE — 82043 UR ALBUMIN QUANTITATIVE: CPT

## 2021-08-17 PROCEDURE — 80053 COMPREHEN METABOLIC PANEL: CPT

## 2021-08-17 PROCEDURE — 85025 COMPLETE CBC W/AUTO DIFF WBC: CPT

## 2021-08-17 PROCEDURE — 82570 ASSAY OF URINE CREATININE: CPT

## 2021-08-17 PROCEDURE — 36415 COLL VENOUS BLD VENIPUNCTURE: CPT

## 2021-08-17 PROCEDURE — 83036 HEMOGLOBIN GLYCOSYLATED A1C: CPT

## 2021-08-17 PROCEDURE — 80061 LIPID PANEL: CPT

## 2021-08-17 PROCEDURE — G0438 PPPS, INITIAL VISIT: HCPCS | Performed by: INTERNAL MEDICINE

## 2021-08-17 PROCEDURE — 86803 HEPATITIS C AB TEST: CPT

## 2021-08-17 NOTE — PATIENT INSTRUCTIONS
Darby Ruiz's SCREENING SCHEDULE   Tests on this list are recommended by your physician but may not be covered, or covered at this frequency, by your insurer. Please check with your insurance carrier before scheduling to verify coverage.    P visit.      John Solis Never done   Pap and Pelvic    Pap   Covered every 2 years for women at normal risk;  Annually if at high risk -  No recommendations at this time    Chlamydia Annually if high risk -  No recommendations at this time   Screening Mammogr Component Value Date    CREATSERUM 0.84 01/28/2021         BUN Annually Lab Results   Component Value Date    BUN 14 01/28/2021       Drug Serum Conc Annually No results found for: DIGOXIN, DIG, VALP              Recommended Websites for [employer]

## 2021-08-17 NOTE — PROGRESS NOTES
HPI:   Adilene Garcia is a 77year old female who presents for a Medicare Subsequent Annual Wellness visit (Pt already had Initial Annual Wellness). Pt has been feeling good. She has knee pain that keeps her from doing what she wants.  She nee obesity with BMI of 40.0-44.9, adult (HCC)     Obstructive sleep apnea    Wt Readings from Last 3 Encounters:  08/17/21 : 243 lb 8 oz (110.5 kg)  05/13/21 : 247 lb 6.4 oz (112.2 kg)  02/12/21 : 245 lb 4 oz (111.2 kg)     Last Cholesterol Labs:   Lab Result drinks of alcohol per week. She reports that she does not use drugs.      REVIEW OF SYSTEMS:   GENERAL: feels well otherwise  SKIN: denies any unusual skin lesions  EYES: denies blurred vision or double vision  HEENT: denies nasal congestion, sinus pain or non-tender, bowel sounds active all four quadrants,  no masses, no organomegaly   Pelvic: Deferred   Extremities: Extremities normal, atraumatic, no cyanosis or edema   Pulses: 2+ and symmetric   Skin: Skin color, texture, turgor normal, no rashes or lesio Encounter for annual health examination  done    6. Need for hepatitis C screening test  Ordered. - HCV ANTIBODY; Future    Diet assessment: good     PLAN:  The patient indicates understanding of these issues and agrees to the plan.   Reinforced healthy d Ultrasound Screening for Abdominal Aortic Aneurysm (AAA) Covered once in a lifetime for one of the following risk factors   • Men who are 73-68 years old and have ever smoked   • Anyone with a family history -     Colorectal Cancer Screening  Covered for a covered by Medicare Part B; may be covered with your pharmacy  prescription benefits -  Zoster Vaccines(1 of 2) Never done       Diabetes      Hemoglobin A1C Annually; if last result is elevated, may be asked to retest more frequently.     Medicare covers e

## 2021-08-19 ENCOUNTER — HOSPITAL ENCOUNTER (OUTPATIENT)
Dept: MAMMOGRAPHY | Age: 66
Discharge: HOME | End: 2021-08-19
Attending: INTERNAL MEDICINE
Payer: MEDICARE

## 2021-08-19 DIAGNOSIS — Z12.31 ENCOUNTER FOR SCREENING MAMMOGRAM FOR MALIGNANT NEOPLASM OF BREAST: ICD-10-CM

## 2021-08-19 DIAGNOSIS — Z12.31 ENCOUNTER FOR SCREENING MAMMOGRAM FOR BREAST CANCER: ICD-10-CM

## 2021-08-19 PROCEDURE — 77067 SCR MAMMO BI INCL CAD: CPT | Performed by: INTERNAL MEDICINE

## 2021-08-20 ENCOUNTER — HOSPITAL ENCOUNTER (OUTPATIENT)
Dept: BONE DENSITY | Age: 66
Discharge: HOME | End: 2021-08-20
Attending: INTERNAL MEDICINE
Payer: MEDICARE

## 2021-08-20 DIAGNOSIS — Z78.0 POSTMENOPAUSAL: ICD-10-CM

## 2021-08-20 PROCEDURE — 77080 DXA BONE DENSITY AXIAL: CPT | Performed by: INTERNAL MEDICINE

## 2021-09-10 DIAGNOSIS — E78.00 HYPERCHOLESTEREMIA: ICD-10-CM

## 2021-09-10 DIAGNOSIS — E11.9 TYPE 2 DIABETES MELLITUS WITHOUT COMPLICATION, WITHOUT LONG-TERM CURRENT USE OF INSULIN (HCC): ICD-10-CM

## 2021-09-10 DIAGNOSIS — I10 ESSENTIAL HYPERTENSION WITH GOAL BLOOD PRESSURE LESS THAN 140/90: ICD-10-CM

## 2021-09-11 RX ORDER — BISOPROLOL FUMARATE AND HYDROCHLOROTHIAZIDE 6.25; 1 MG/1; MG/1
2 TABLET ORAL DAILY
Qty: 180 TABLET | Refills: 0 | Status: SHIPPED | OUTPATIENT
Start: 2021-09-11 | End: 2021-12-07

## 2021-09-11 RX ORDER — METFORMIN HYDROCHLORIDE 750 MG/1
TABLET, EXTENDED RELEASE ORAL
Qty: 180 TABLET | Refills: 3 | Status: SHIPPED | OUTPATIENT
Start: 2021-09-11

## 2021-10-09 ENCOUNTER — TELEPHONE (OUTPATIENT)
Dept: FAMILY MEDICINE CLINIC | Facility: CLINIC | Age: 66
End: 2021-10-09

## 2021-10-12 ENCOUNTER — OFFICE VISIT (OUTPATIENT)
Dept: FAMILY MEDICINE CLINIC | Facility: CLINIC | Age: 66
End: 2021-10-12
Payer: MEDICARE

## 2021-10-12 VITALS
TEMPERATURE: 98 F | OXYGEN SATURATION: 96 % | HEART RATE: 63 BPM | RESPIRATION RATE: 18 BRPM | BODY MASS INDEX: 46.09 KG/M2 | HEIGHT: 61 IN | WEIGHT: 244.13 LBS | SYSTOLIC BLOOD PRESSURE: 128 MMHG | DIASTOLIC BLOOD PRESSURE: 76 MMHG

## 2021-10-12 DIAGNOSIS — H61.20 IMPACTED CERUMEN, UNSPECIFIED LATERALITY: Primary | ICD-10-CM

## 2021-10-12 DIAGNOSIS — E66.01 MORBID OBESITY WITH BMI OF 40.0-44.9, ADULT (HCC): ICD-10-CM

## 2021-10-12 PROCEDURE — 99214 OFFICE O/P EST MOD 30 MIN: CPT | Performed by: INTERNAL MEDICINE

## 2021-10-12 NOTE — PROGRESS NOTES
HPI:   Laura Richard is a 77year old female who wears hearing aids and needs ears flushed per audiology.      Current Outpatient Medications   Medication Sig Dispense Refill   • BISOPROLOL-HYDROCHLOROTHIAZIDE 10-6.25 MG Oral Tab TAKE 2 TABLETS BY headaches    EXAM:   /76   Pulse 63   Temp 97.5 °F (36.4 °C) (Temporal)   Resp 18   Ht 5' 1\" (1.549 m)   Wt 244 lb 2 oz (110.7 kg)   SpO2 96%   BMI 46.13 kg/m²   GENERAL: well developed, well nourished,in no apparent distress  SKIN: no rashes,no veronique

## 2021-11-17 ENCOUNTER — TELEPHONE (OUTPATIENT)
Dept: FAMILY MEDICINE CLINIC | Facility: CLINIC | Age: 66
End: 2021-11-17

## 2021-11-17 NOTE — TELEPHONE ENCOUNTER
Patient called in to let us know she was running late. I checked with the provider to see if they would still see her. Provider said no that they would have to reschedule.     Patient upset with this and I also informed her that she would be charged a n

## 2021-12-07 DIAGNOSIS — E11.9 TYPE 2 DIABETES MELLITUS WITHOUT COMPLICATION, WITHOUT LONG-TERM CURRENT USE OF INSULIN (HCC): ICD-10-CM

## 2021-12-07 DIAGNOSIS — E78.00 HYPERCHOLESTEREMIA: ICD-10-CM

## 2021-12-07 DIAGNOSIS — I10 ESSENTIAL HYPERTENSION WITH GOAL BLOOD PRESSURE LESS THAN 140/90: ICD-10-CM

## 2021-12-07 RX ORDER — BISOPROLOL FUMARATE AND HYDROCHLOROTHIAZIDE 6.25; 1 MG/1; MG/1
2 TABLET ORAL DAILY
Qty: 180 TABLET | Refills: 1 | Status: SHIPPED | OUTPATIENT
Start: 2021-12-07

## 2021-12-07 NOTE — TELEPHONE ENCOUNTER
Hypertension Medications Protocol Passed 12/07/2021 09:00 AM   Protocol Details  CMP or BMP in past 12 months    Last serum creatinine< 2.0    Appointment in past 6 or next 3 months     Last refill - 9/11/21 - #180  Last CMP - 8/17/21 - creatinine - 0.79

## 2021-12-26 DIAGNOSIS — E11.9 TYPE 2 DIABETES MELLITUS WITHOUT COMPLICATION, WITHOUT LONG-TERM CURRENT USE OF INSULIN (HCC): ICD-10-CM

## 2021-12-27 RX ORDER — GLIMEPIRIDE 2 MG/1
TABLET ORAL
Qty: 180 TABLET | Refills: 0 | Status: SHIPPED | OUTPATIENT
Start: 2021-12-27

## 2021-12-27 NOTE — TELEPHONE ENCOUNTER
Diabetic Medication Protocol Failed 12/26/2021 02:29 PM   Protocol Details  Last HgBA1C < 7.5    HgBA1C procedure resulted in past 6 months    Microalbumin procedure in past 12 months or taking ACE/ARB    Appointment in past 6 or next 3 months     Last ref

## 2022-02-03 RX ORDER — ROSUVASTATIN CALCIUM 10 MG/1
TABLET, COATED ORAL
Qty: 90 TABLET | Refills: 1 | Status: SHIPPED | OUTPATIENT
Start: 2022-02-03

## 2022-03-25 RX ORDER — GLIMEPIRIDE 2 MG/1
TABLET ORAL
Qty: 180 TABLET | Refills: 0 | Status: SHIPPED | OUTPATIENT
Start: 2022-03-25

## 2022-03-25 NOTE — TELEPHONE ENCOUNTER
Diabetic Medication Protocol Failed 03/25/2022 12:33 PM   Protocol Details  HgBA1C procedure resulted in past 6 months    Last HgBA1C < 7.5    Microalbumin procedure in past 12 months or taking ACE/ARB    Appointment in past 6 or next 3 months     Last refill - 12/27/21 - #180   Last A1c - 8/17/21 - 7.5  Last office visit - 10/12/21  No future appointments.   [employer] message sent - due for office visit

## 2022-05-26 ENCOUNTER — LAB ENCOUNTER (OUTPATIENT)
Dept: LAB | Age: 67
End: 2022-05-26
Attending: INTERNAL MEDICINE
Payer: MEDICARE

## 2022-05-26 DIAGNOSIS — I10 ESSENTIAL HYPERTENSION WITH GOAL BLOOD PRESSURE LESS THAN 140/90: ICD-10-CM

## 2022-05-26 DIAGNOSIS — E11.9 TYPE 2 DIABETES MELLITUS WITHOUT COMPLICATION, WITHOUT LONG-TERM CURRENT USE OF INSULIN (HCC): ICD-10-CM

## 2022-05-26 DIAGNOSIS — E11.9 TYPE 2 DIABETES MELLITUS WITHOUT COMPLICATION, WITHOUT LONG-TERM CURRENT USE OF INSULIN (HCC): Primary | ICD-10-CM

## 2022-05-26 DIAGNOSIS — E78.00 HYPERCHOLESTEREMIA: ICD-10-CM

## 2022-05-26 LAB
ALBUMIN SERPL-MCNC: 3.8 G/DL (ref 3.4–5)
ALBUMIN/GLOB SERPL: 1.1 {RATIO} (ref 1–2)
ALP LIVER SERPL-CCNC: 68 U/L
ALT SERPL-CCNC: 51 U/L
ANION GAP SERPL CALC-SCNC: 4 MMOL/L (ref 0–18)
AST SERPL-CCNC: 28 U/L (ref 15–37)
BASOPHILS # BLD AUTO: 0.06 X10(3) UL (ref 0–0.2)
BASOPHILS NFR BLD AUTO: 0.9 %
BILIRUB SERPL-MCNC: 0.9 MG/DL (ref 0.1–2)
BUN BLD-MCNC: 13 MG/DL (ref 7–18)
CALCIUM BLD-MCNC: 9.5 MG/DL (ref 8.5–10.1)
CHLORIDE SERPL-SCNC: 104 MMOL/L (ref 98–112)
CHOLEST SERPL-MCNC: 148 MG/DL (ref ?–200)
CO2 SERPL-SCNC: 27 MMOL/L (ref 21–32)
CREAT BLD-MCNC: 0.82 MG/DL
EOSINOPHIL # BLD AUTO: 0.27 X10(3) UL (ref 0–0.7)
EOSINOPHIL NFR BLD AUTO: 3.9 %
ERYTHROCYTE [DISTWIDTH] IN BLOOD BY AUTOMATED COUNT: 12.8 %
EST. AVERAGE GLUCOSE BLD GHB EST-MCNC: 171 MG/DL (ref 68–126)
FASTING PATIENT LIPID ANSWER: YES
FASTING STATUS PATIENT QL REPORTED: YES
GLOBULIN PLAS-MCNC: 3.5 G/DL (ref 2.8–4.4)
GLUCOSE BLD-MCNC: 178 MG/DL (ref 70–99)
HBA1C MFR BLD: 7.6 % (ref ?–5.7)
HCT VFR BLD AUTO: 40.5 %
HDLC SERPL-MCNC: 64 MG/DL (ref 40–59)
HGB BLD-MCNC: 13 G/DL
IMM GRANULOCYTES # BLD AUTO: 0.03 X10(3) UL (ref 0–1)
IMM GRANULOCYTES NFR BLD: 0.4 %
LDLC SERPL CALC-MCNC: 53 MG/DL (ref ?–100)
LYMPHOCYTES # BLD AUTO: 2.33 X10(3) UL (ref 1–4)
LYMPHOCYTES NFR BLD AUTO: 33.9 %
MCH RBC QN AUTO: 30.6 PG (ref 26–34)
MCHC RBC AUTO-ENTMCNC: 32.1 G/DL (ref 31–37)
MCV RBC AUTO: 95.3 FL
MONOCYTES # BLD AUTO: 0.53 X10(3) UL (ref 0.1–1)
MONOCYTES NFR BLD AUTO: 7.7 %
NEUTROPHILS # BLD AUTO: 3.66 X10 (3) UL (ref 1.5–7.7)
NEUTROPHILS # BLD AUTO: 3.66 X10(3) UL (ref 1.5–7.7)
NEUTROPHILS NFR BLD AUTO: 53.2 %
NONHDLC SERPL-MCNC: 84 MG/DL (ref ?–130)
OSMOLALITY SERPL CALC.SUM OF ELEC: 285 MOSM/KG (ref 275–295)
PLATELET # BLD AUTO: 213 10(3)UL (ref 150–450)
POTASSIUM SERPL-SCNC: 4 MMOL/L (ref 3.5–5.1)
PROT SERPL-MCNC: 7.3 G/DL (ref 6.4–8.2)
RBC # BLD AUTO: 4.25 X10(6)UL
SODIUM SERPL-SCNC: 135 MMOL/L (ref 136–145)
TRIGL SERPL-MCNC: 192 MG/DL (ref 30–149)
VLDLC SERPL CALC-MCNC: 28 MG/DL (ref 0–30)
WBC # BLD AUTO: 6.9 X10(3) UL (ref 4–11)

## 2022-05-26 PROCEDURE — 36415 COLL VENOUS BLD VENIPUNCTURE: CPT

## 2022-05-26 PROCEDURE — 80053 COMPREHEN METABOLIC PANEL: CPT

## 2022-05-26 PROCEDURE — 80061 LIPID PANEL: CPT

## 2022-05-26 PROCEDURE — 85025 COMPLETE CBC W/AUTO DIFF WBC: CPT

## 2022-05-26 PROCEDURE — 83036 HEMOGLOBIN GLYCOSYLATED A1C: CPT

## 2022-06-02 ENCOUNTER — OFFICE VISIT (OUTPATIENT)
Dept: FAMILY MEDICINE CLINIC | Facility: CLINIC | Age: 67
End: 2022-06-02
Payer: MEDICARE

## 2022-06-02 VITALS
OXYGEN SATURATION: 97 % | TEMPERATURE: 97 F | RESPIRATION RATE: 18 BRPM | BODY MASS INDEX: 48.94 KG/M2 | HEART RATE: 60 BPM | SYSTOLIC BLOOD PRESSURE: 132 MMHG | DIASTOLIC BLOOD PRESSURE: 76 MMHG | WEIGHT: 259.25 LBS | HEIGHT: 61 IN

## 2022-06-02 DIAGNOSIS — E11.9 TYPE 2 DIABETES MELLITUS WITHOUT COMPLICATION, WITHOUT LONG-TERM CURRENT USE OF INSULIN (HCC): Primary | ICD-10-CM

## 2022-06-02 DIAGNOSIS — E66.01 MORBID OBESITY WITH BMI OF 40.0-44.9, ADULT (HCC): ICD-10-CM

## 2022-06-02 PROCEDURE — 99214 OFFICE O/P EST MOD 30 MIN: CPT | Performed by: INTERNAL MEDICINE

## 2022-06-02 RX ORDER — PIOGLITAZONEHYDROCHLORIDE 30 MG/1
30 TABLET ORAL DAILY
Qty: 90 TABLET | Refills: 3 | Status: SHIPPED | OUTPATIENT
Start: 2022-06-02 | End: 2023-05-28

## 2022-06-05 PROBLEM — E66.01 MORBID (SEVERE) OBESITY DUE TO EXCESS CALORIES (HCC): Status: ACTIVE | Noted: 2022-06-05

## 2022-06-09 ENCOUNTER — TELEPHONE (OUTPATIENT)
Dept: FAMILY MEDICINE CLINIC | Facility: CLINIC | Age: 67
End: 2022-06-09

## 2022-06-24 ENCOUNTER — TELEPHONE (OUTPATIENT)
Dept: FAMILY MEDICINE CLINIC | Facility: CLINIC | Age: 67
End: 2022-06-24

## 2022-06-24 DIAGNOSIS — E78.00 HYPERCHOLESTEREMIA: ICD-10-CM

## 2022-06-24 DIAGNOSIS — E11.9 TYPE 2 DIABETES MELLITUS WITHOUT COMPLICATION, WITHOUT LONG-TERM CURRENT USE OF INSULIN (HCC): ICD-10-CM

## 2022-06-24 DIAGNOSIS — I10 ESSENTIAL HYPERTENSION WITH GOAL BLOOD PRESSURE LESS THAN 140/90: ICD-10-CM

## 2022-06-24 RX ORDER — METFORMIN HYDROCHLORIDE 750 MG/1
1500 TABLET, EXTENDED RELEASE ORAL
Qty: 180 TABLET | Refills: 3 | Status: SHIPPED | OUTPATIENT
Start: 2022-06-24

## 2022-06-24 RX ORDER — GLIMEPIRIDE 2 MG/1
4 TABLET ORAL
Qty: 180 TABLET | Refills: 0 | Status: SHIPPED | OUTPATIENT
Start: 2022-06-24

## 2022-06-24 RX ORDER — BISOPROLOL FUMARATE AND HYDROCHLOROTHIAZIDE 6.25; 1 MG/1; MG/1
2 TABLET ORAL DAILY
Qty: 180 TABLET | Refills: 1 | Status: SHIPPED | OUTPATIENT
Start: 2022-06-24

## 2022-06-24 NOTE — TELEPHONE ENCOUNTER
LOV 06/02/20222     LAST LAB 05/26/2022    LAST RX  Glimepiride #180 R0 03/25/2022  Metformin #180 R3 09/11/2021  Bisoprolol #180 R1 12/07/2021    Next OV   Future Appointments   Date Time Provider Trista Dunn   8/29/2022  1:00 PM Betty Adrian MD [id number] [address] [facility]     PROTOCOL

## 2022-06-24 NOTE — TELEPHONE ENCOUNTER
PT NEEDS REFILL ON-    GLIMEPIRIDE 2 MG Oral Tab    METFORMIN HCL  MG Oral Tablet 24 Hr    BISOPROLOL-HYDROCHLOROTHIAZIDE 10-6.25 MG Oral Tab      90 DAY SUPPLY PLEASE    [facility] #[id number] - [address] -  [address] [place] [address] ([address]), [phone], [phone]

## 2022-07-25 DIAGNOSIS — E78.00 HYPERCHOLESTEREMIA: ICD-10-CM

## 2022-07-25 DIAGNOSIS — M17.0 PRIMARY OSTEOARTHRITIS OF BOTH KNEES: ICD-10-CM

## 2022-07-26 RX ORDER — ROSUVASTATIN CALCIUM 10 MG/1
10 TABLET, COATED ORAL NIGHTLY
Qty: 90 TABLET | Refills: 1 | Status: SHIPPED | OUTPATIENT
Start: 2022-07-26

## 2022-07-26 RX ORDER — MELOXICAM 15 MG/1
15 TABLET ORAL DAILY
Qty: 90 TABLET | Refills: 3 | Status: SHIPPED | OUTPATIENT
Start: 2022-07-26

## 2022-08-23 ENCOUNTER — TELEMEDICINE (OUTPATIENT)
Dept: FAMILY MEDICINE CLINIC | Facility: CLINIC | Age: 67
End: 2022-08-23
Payer: MEDICARE

## 2022-08-23 DIAGNOSIS — G47.33 OSA (OBSTRUCTIVE SLEEP APNEA): Primary | ICD-10-CM

## 2022-08-23 PROCEDURE — 99214 OFFICE O/P EST MOD 30 MIN: CPT | Performed by: FAMILY MEDICINE

## 2022-08-23 NOTE — PATIENT INSTRUCTIONS
Please be advised:   Do not drive while sleepy   Take CPAP/BiPAP machine to any procedure that requires sedation     When should I clean my machine & supplies? Clean mask cushions or nasal pillow, headgear, tubing, and humidifier chamber with mild soap (Fiorella) and water   If water chamber has hard water buildup (white crust), soak in warm water & vinegar mix 50/50. Rinse and hang dry     DAILY   Wipe mask cushions or nasal pillow   Empty & rinse water chamber- refill with distilled water     WEEKLY   Clean mask cushions or nasal pillow, headgear, tubing, and humidifier chamber with mild soap (Fiorella) and water   If water chamber has hard water buildup (white crust), soak in warm water & vinegar mix 50/50. Rinse and hang dry     When should supplies be replaced? Contact your home care company for replacement supplies, or if your machine is malfunctioning   *Below is a general guideline of what we recommend. Replacement of supplies differs depending on your insurance company*   MONTHLY: Replace filter and mask cushion   6 MONTHS: Replace headgear and tubing     Travel Tips   Keep CPAP/BiPAP in original bag when traveling, and place luggage tag on bag   Most airlines consider CPAP/BiPAP to be a medical device, therefore it is a free carry-on item   If unable to get distilled water, bottled water is safe while traveling.  DO NOT use tap water   When traveling outside the [place] only a power adapter is necessary (CPAP can operate without a converter), bring an extension cord   Consider purchasing or renting a travel CPAP (not covered by insurance)     Dry Mouth/Nose   Turn up the humidity on your machine (select \"Options\" from the home screen)   Place a cool mist humidifier at your bedside   Over-the-counter remedies: Biotene, XyliMelts, NasoGel     Air Leak   Try adjusting your mask/headgear while laying in sleeping position vs. sitting up   Wash and dry your face prior to putting your mask on   If applicable: shave facial hair at night (or before wearing CPAP)   Purchase \"RemZzzs\" (through home care co., [employer], or remzzzs. com)   100% cotton knit barrier that goes between your mask cushion and your skin   Replace your mask cushion (at least once per month) and/or headgear (every 3-6 months)     Nasal Congestion   CPAP therapy can cause nasal passages to dry out, & mucous membranes try to protect the nasal passages by producing excess mucous, so congestion results. Over-the counter remedies: Flonase, Nasacort, Sinus Rinses (Neti-Pot), DO NOT USE Afrin   Try a mask that goes over the nose and mouth     Skin Irritation   Clean supplies regularly (Citrus II Mask Wipes, Control III disinfectant solution)   Try over the counter creams such as hydrocortisone 1% (apply in the morning after showering)   Your headgear may be too tight, replace supplies so you don't need to adjust so tightly   Try RemZzzs (100% cotton knit barrier that goes between your mask cushion and your skin)     Gas/Bloating   Try a different sleeping position to keep air out of the stomach. Lay on the left side or rotate to the right side. Incline with pillows or lay flat.    Over-the-counter remedies: Simethicone

## 2022-08-25 ENCOUNTER — OFFICE VISIT (OUTPATIENT)
Dept: FAMILY MEDICINE CLINIC | Facility: CLINIC | Age: 67
End: 2022-08-25
Payer: MEDICARE

## 2022-08-25 ENCOUNTER — LABORATORY ENCOUNTER (OUTPATIENT)
Dept: LAB | Age: 67
End: 2022-08-25
Attending: INTERNAL MEDICINE
Payer: MEDICARE

## 2022-08-25 VITALS
DIASTOLIC BLOOD PRESSURE: 78 MMHG | TEMPERATURE: 98 F | WEIGHT: 244.38 LBS | RESPIRATION RATE: 18 BRPM | HEIGHT: 60.75 IN | BODY MASS INDEX: 46.74 KG/M2 | OXYGEN SATURATION: 96 % | HEART RATE: 63 BPM | SYSTOLIC BLOOD PRESSURE: 126 MMHG

## 2022-08-25 DIAGNOSIS — Z00.00 ENCOUNTER FOR ANNUAL HEALTH EXAMINATION: ICD-10-CM

## 2022-08-25 DIAGNOSIS — E11.9 TYPE 2 DIABETES MELLITUS WITHOUT COMPLICATION, WITHOUT LONG-TERM CURRENT USE OF INSULIN (HCC): ICD-10-CM

## 2022-08-25 DIAGNOSIS — Z78.0 POSTMENOPAUSAL: ICD-10-CM

## 2022-08-25 DIAGNOSIS — I10 ESSENTIAL HYPERTENSION WITH GOAL BLOOD PRESSURE LESS THAN 140/90: ICD-10-CM

## 2022-08-25 DIAGNOSIS — M17.0 PRIMARY OSTEOARTHRITIS OF BOTH KNEES: ICD-10-CM

## 2022-08-25 DIAGNOSIS — G47.33 OSA (OBSTRUCTIVE SLEEP APNEA): Primary | ICD-10-CM

## 2022-08-25 DIAGNOSIS — Z97.4 WEARS HEARING AID: ICD-10-CM

## 2022-08-25 DIAGNOSIS — Z12.31 VISIT FOR SCREENING MAMMOGRAM: ICD-10-CM

## 2022-08-25 DIAGNOSIS — Z12.31 ENCOUNTER FOR SCREENING MAMMOGRAM FOR MALIGNANT NEOPLASM OF BREAST: ICD-10-CM

## 2022-08-25 DIAGNOSIS — E78.00 HYPERCHOLESTEREMIA: ICD-10-CM

## 2022-08-25 LAB
ALBUMIN SERPL-MCNC: 3.7 G/DL (ref 3.4–5)
ALBUMIN/GLOB SERPL: 1 {RATIO} (ref 1–2)
ALP LIVER SERPL-CCNC: 48 U/L
ALT SERPL-CCNC: 34 U/L
ANION GAP SERPL CALC-SCNC: 2 MMOL/L (ref 0–18)
AST SERPL-CCNC: 17 U/L (ref 15–37)
BASOPHILS # BLD AUTO: 0.04 X10(3) UL (ref 0–0.2)
BASOPHILS NFR BLD AUTO: 0.6 %
BILIRUB SERPL-MCNC: 1 MG/DL (ref 0.1–2)
BUN BLD-MCNC: 22 MG/DL (ref 7–18)
CALCIUM BLD-MCNC: 10.2 MG/DL (ref 8.5–10.1)
CHLORIDE SERPL-SCNC: 104 MMOL/L (ref 98–112)
CHOLEST SERPL-MCNC: 141 MG/DL (ref ?–200)
CO2 SERPL-SCNC: 33 MMOL/L (ref 21–32)
CREAT BLD-MCNC: 1.02 MG/DL
CREAT UR-SCNC: 93.6 MG/DL
EOSINOPHIL # BLD AUTO: 0.17 X10(3) UL (ref 0–0.7)
EOSINOPHIL NFR BLD AUTO: 2.4 %
ERYTHROCYTE [DISTWIDTH] IN BLOOD BY AUTOMATED COUNT: 13.4 %
EST. AVERAGE GLUCOSE BLD GHB EST-MCNC: 137 MG/DL (ref 68–126)
FASTING PATIENT LIPID ANSWER: NO
FASTING STATUS PATIENT QL REPORTED: NO
GFR SERPLBLD BASED ON 1.73 SQ M-ARVRAT: 60 ML/MIN/1.73M2 (ref 60–?)
GLOBULIN PLAS-MCNC: 3.7 G/DL (ref 2.8–4.4)
GLUCOSE BLD-MCNC: 158 MG/DL (ref 70–99)
HBA1C MFR BLD: 6.4 % (ref ?–5.7)
HCT VFR BLD AUTO: 39.6 %
HDLC SERPL-MCNC: 71 MG/DL (ref 40–59)
HGB BLD-MCNC: 12.2 G/DL
IMM GRANULOCYTES # BLD AUTO: 0.02 X10(3) UL (ref 0–1)
IMM GRANULOCYTES NFR BLD: 0.3 %
LDLC SERPL CALC-MCNC: 49 MG/DL (ref ?–100)
LYMPHOCYTES # BLD AUTO: 2.03 X10(3) UL (ref 1–4)
LYMPHOCYTES NFR BLD AUTO: 29.3 %
MCH RBC QN AUTO: 30.2 PG (ref 26–34)
MCHC RBC AUTO-ENTMCNC: 30.8 G/DL (ref 31–37)
MCV RBC AUTO: 98 FL
MICROALBUMIN UR-MCNC: 1.39 MG/DL
MICROALBUMIN/CREAT 24H UR-RTO: 14.9 UG/MG (ref ?–30)
MONOCYTES # BLD AUTO: 0.65 X10(3) UL (ref 0.1–1)
MONOCYTES NFR BLD AUTO: 9.4 %
NEUTROPHILS # BLD AUTO: 4.03 X10 (3) UL (ref 1.5–7.7)
NEUTROPHILS # BLD AUTO: 4.03 X10(3) UL (ref 1.5–7.7)
NEUTROPHILS NFR BLD AUTO: 58 %
NONHDLC SERPL-MCNC: 70 MG/DL (ref ?–130)
OSMOLALITY SERPL CALC.SUM OF ELEC: 295 MOSM/KG (ref 275–295)
PLATELET # BLD AUTO: 197 10(3)UL (ref 150–450)
POTASSIUM SERPL-SCNC: 4.4 MMOL/L (ref 3.5–5.1)
PROT SERPL-MCNC: 7.4 G/DL (ref 6.4–8.2)
RBC # BLD AUTO: 4.04 X10(6)UL
SODIUM SERPL-SCNC: 139 MMOL/L (ref 136–145)
TRIGL SERPL-MCNC: 123 MG/DL (ref 30–149)
VLDLC SERPL CALC-MCNC: 17 MG/DL (ref 0–30)
WBC # BLD AUTO: 6.9 X10(3) UL (ref 4–11)

## 2022-08-25 PROCEDURE — G0009 ADMIN PNEUMOCOCCAL VACCINE: HCPCS | Performed by: INTERNAL MEDICINE

## 2022-08-25 PROCEDURE — 1125F AMNT PAIN NOTED PAIN PRSNT: CPT | Performed by: INTERNAL MEDICINE

## 2022-08-25 PROCEDURE — 85025 COMPLETE CBC W/AUTO DIFF WBC: CPT

## 2022-08-25 PROCEDURE — G0439 PPPS, SUBSEQ VISIT: HCPCS | Performed by: INTERNAL MEDICINE

## 2022-08-25 PROCEDURE — 36415 COLL VENOUS BLD VENIPUNCTURE: CPT

## 2022-08-25 PROCEDURE — 80053 COMPREHEN METABOLIC PANEL: CPT

## 2022-08-25 PROCEDURE — 82043 UR ALBUMIN QUANTITATIVE: CPT

## 2022-08-25 PROCEDURE — 90677 PCV20 VACCINE IM: CPT | Performed by: INTERNAL MEDICINE

## 2022-08-25 PROCEDURE — 82570 ASSAY OF URINE CREATININE: CPT

## 2022-08-25 PROCEDURE — 80061 LIPID PANEL: CPT

## 2022-08-25 PROCEDURE — 83036 HEMOGLOBIN GLYCOSYLATED A1C: CPT

## 2022-09-14 DIAGNOSIS — E11.9 TYPE 2 DIABETES MELLITUS WITHOUT COMPLICATION, WITHOUT LONG-TERM CURRENT USE OF INSULIN (HCC): ICD-10-CM

## 2022-09-14 DIAGNOSIS — E78.00 HYPERCHOLESTEREMIA: ICD-10-CM

## 2022-09-14 DIAGNOSIS — I10 ESSENTIAL HYPERTENSION WITH GOAL BLOOD PRESSURE LESS THAN 140/90: ICD-10-CM

## 2022-09-14 RX ORDER — LISINOPRIL 20 MG/1
20 TABLET ORAL DAILY
Qty: 90 TABLET | Refills: 3 | Status: SHIPPED | OUTPATIENT
Start: 2022-09-14

## 2022-09-19 DIAGNOSIS — E11.9 TYPE 2 DIABETES MELLITUS WITHOUT COMPLICATION, WITHOUT LONG-TERM CURRENT USE OF INSULIN (HCC): ICD-10-CM

## 2022-09-19 RX ORDER — GLIMEPIRIDE 2 MG/1
4 TABLET ORAL
Qty: 180 TABLET | Refills: 1 | Status: SHIPPED | OUTPATIENT
Start: 2022-09-19

## 2022-09-19 RX ORDER — GLIMEPIRIDE 2 MG/1
4 TABLET ORAL
Qty: 180 TABLET | Refills: 0 | OUTPATIENT
Start: 2022-09-19

## 2022-09-19 RX ORDER — GLIMEPIRIDE 2 MG/1
4 TABLET ORAL
Qty: 180 TABLET | Refills: 0 | Status: CANCELLED | OUTPATIENT
Start: 2022-09-19

## 2022-09-19 NOTE — TELEPHONE ENCOUNTER
Diabetic Medication Protocol Passed 09/19/2022 08:20 AM   Protocol Details  HgBA1C procedure resulted in past 6 months    Last HgBA1C < 7.5    Microalbumin procedure in past 12 months or taking ACE/ARB    Appointment in past 6 or next 3 months     Last refill - 6/24/22 - #180   Last A1c - 8/25/22 - 6.4  Last office visit - 8/25/22  Refilled per protocol

## 2022-10-25 ENCOUNTER — ANESTHESIA (OUTPATIENT)
Dept: ENDOSCOPY | Facility: HOSPITAL | Age: 67
End: 2022-10-25
Payer: MEDICARE

## 2022-10-25 ENCOUNTER — HOSPITAL ENCOUNTER (OUTPATIENT)
Facility: HOSPITAL | Age: 67
Setting detail: HOSPITAL OUTPATIENT SURGERY
Discharge: HOME | End: 2022-10-25
Attending: INTERNAL MEDICINE | Admitting: INTERNAL MEDICINE
Payer: MEDICARE

## 2022-10-25 ENCOUNTER — ANESTHESIA EVENT (OUTPATIENT)
Dept: ENDOSCOPY | Facility: HOSPITAL | Age: 67
End: 2022-10-25
Payer: MEDICARE

## 2022-10-25 VITALS
RESPIRATION RATE: 18 BRPM | BODY MASS INDEX: 44.73 KG/M2 | DIASTOLIC BLOOD PRESSURE: 58 MMHG | TEMPERATURE: 98 F | HEIGHT: 61.5 IN | HEART RATE: 56 BPM | SYSTOLIC BLOOD PRESSURE: 116 MMHG | OXYGEN SATURATION: 95 % | WEIGHT: 240 LBS

## 2022-10-25 DIAGNOSIS — Z86.010 PERSONAL HISTORY OF COLONIC POLYPS: ICD-10-CM

## 2022-10-25 LAB — GLUCOSE BLD-MCNC: 127 MG/DL (ref 70–99)

## 2022-10-25 PROCEDURE — 82962 GLUCOSE BLOOD TEST: CPT

## 2022-10-25 PROCEDURE — 0DJD8ZZ INSPECTION OF LOWER INTESTINAL TRACT, VIA NATURAL OR ARTIFICIAL OPENING ENDOSCOPIC: ICD-10-PCS | Performed by: INTERNAL MEDICINE

## 2022-10-25 RX ORDER — ONDANSETRON 2 MG/ML
4 INJECTION INTRAMUSCULAR; INTRAVENOUS EVERY 6 HOURS PRN
Status: DISCONTINUED | OUTPATIENT
Start: 2022-10-25 | End: 2022-10-25

## 2022-10-25 RX ORDER — NALOXONE HYDROCHLORIDE 0.4 MG/ML
80 INJECTION, SOLUTION INTRAMUSCULAR; INTRAVENOUS; SUBCUTANEOUS AS NEEDED
Status: DISCONTINUED | OUTPATIENT
Start: 2022-10-25 | End: 2022-10-25

## 2022-10-25 RX ORDER — SODIUM CHLORIDE, SODIUM LACTATE, POTASSIUM CHLORIDE, CALCIUM CHLORIDE 600; 310; 30; 20 MG/100ML; MG/100ML; MG/100ML; MG/100ML
INJECTION, SOLUTION INTRAVENOUS CONTINUOUS
Status: DISCONTINUED | OUTPATIENT
Start: 2022-10-25 | End: 2022-10-25

## 2022-10-25 RX ORDER — HYDROMORPHONE HYDROCHLORIDE 1 MG/ML
0.2 INJECTION, SOLUTION INTRAMUSCULAR; INTRAVENOUS; SUBCUTANEOUS EVERY 5 MIN PRN
Status: DISCONTINUED | OUTPATIENT
Start: 2022-10-25 | End: 2022-10-25

## 2022-10-25 RX ORDER — METOCLOPRAMIDE HYDROCHLORIDE 5 MG/ML
10 INJECTION INTRAMUSCULAR; INTRAVENOUS EVERY 8 HOURS PRN
Status: DISCONTINUED | OUTPATIENT
Start: 2022-10-25 | End: 2022-10-25

## 2022-10-25 RX ORDER — NICOTINE POLACRILEX 4 MG
30 LOZENGE BUCCAL
Status: DISCONTINUED | OUTPATIENT
Start: 2022-10-25 | End: 2022-10-25

## 2022-10-25 RX ORDER — LIDOCAINE HYDROCHLORIDE 10 MG/ML
INJECTION, SOLUTION EPIDURAL; INFILTRATION; INTRACAUDAL; PERINEURAL AS NEEDED
Status: DISCONTINUED | OUTPATIENT
Start: 2022-10-25 | End: 2022-10-25 | Stop reason: SURG

## 2022-10-25 RX ORDER — DEXTROSE MONOHYDRATE 25 G/50ML
50 INJECTION, SOLUTION INTRAVENOUS
Status: DISCONTINUED | OUTPATIENT
Start: 2022-10-25 | End: 2022-10-25

## 2022-10-25 RX ORDER — NICOTINE POLACRILEX 4 MG
15 LOZENGE BUCCAL
Status: DISCONTINUED | OUTPATIENT
Start: 2022-10-25 | End: 2022-10-25

## 2022-10-25 RX ORDER — HYDROMORPHONE HYDROCHLORIDE 1 MG/ML
0.4 INJECTION, SOLUTION INTRAMUSCULAR; INTRAVENOUS; SUBCUTANEOUS EVERY 5 MIN PRN
Status: DISCONTINUED | OUTPATIENT
Start: 2022-10-25 | End: 2022-10-25

## 2022-10-25 RX ORDER — HYDROMORPHONE HYDROCHLORIDE 1 MG/ML
0.6 INJECTION, SOLUTION INTRAMUSCULAR; INTRAVENOUS; SUBCUTANEOUS EVERY 5 MIN PRN
Status: DISCONTINUED | OUTPATIENT
Start: 2022-10-25 | End: 2022-10-25

## 2022-10-25 RX ADMIN — SODIUM CHLORIDE, SODIUM LACTATE, POTASSIUM CHLORIDE, CALCIUM CHLORIDE: 600; 310; 30; 20 INJECTION, SOLUTION INTRAVENOUS at 08:57:00

## 2022-10-25 RX ADMIN — SODIUM CHLORIDE, SODIUM LACTATE, POTASSIUM CHLORIDE, CALCIUM CHLORIDE: 600; 310; 30; 20 INJECTION, SOLUTION INTRAVENOUS at 09:17:00

## 2022-10-25 RX ADMIN — LIDOCAINE HYDROCHLORIDE 25 MG: 10 INJECTION, SOLUTION EPIDURAL; INFILTRATION; INTRACAUDAL; PERINEURAL at 08:57:00

## 2022-10-25 NOTE — OPERATIVE REPORT
Jeremy Ruiz Patient Status:  Hospital Outpatient Surgery    1955 MRN [id number]   Location [id number] [place] [address] Attending Mary Ann Brown MD   Date 10/25/2022 PCP Nakul Yañez MD     PREOPERATIVE DIAGNOSIS/INDICATION: H/o polyps  POSTOPERTATIVE DIAGNOSIS: Hemorrhoids  PROCEDURE PERFORMED: COLONOSCOPY  SEDATION: MAC sedation provided by General Anesthesia    TIME OUT WAS PERFORMED    INFORMED CONSENT: Risks, benefits and alternatives to the procedure were explained to the patient including but not limited to bleeding, infection, perforation, adverse drug reactions, pancreatitis and the need for hospitalization and surgery if this occurs, the patient understands and agrees to procedure. PROCEDURE DESCRIPTION: After careful digital rectal examination a pediatric colonoscope was introduced into the patients rectum, advanced pass the recto sigmoid junction, into the descending colon, splenic flexure, transverse colon, hepatic flexure, ascending colon, cecum, confirmed by landmarks, including the appendiceal orifice and ileocecal valve. Careful examination of the above described areas was performed on withdrawal of the endoscope. Retroflexion was performed on the rectum. The patient tolerated the procedure well, there were no immediate complication immediately following the procedure, and the patient was transferred to recovery in stable condition. QUALITY OF PREPARATION: [place] Bowel Preparation Scale:            -      Right colon 3, Transverse colon 3, Left colon 3   FINDINGS/THERAPEUTICS:  - COLON: Normal  - RECTUM: Grade 2  Internal hemorrhoids  RECOMMENDATIONS:   - Post Colonoscopy precautions, watch for bleeding, infection, perforation, adverse drug reactions   - Repeat colonoscopy in 10 years.     Vance Thompson MD  10/25/2022  9:23 AM

## 2022-11-14 ENCOUNTER — HOSPITAL ENCOUNTER (OUTPATIENT)
Dept: MAMMOGRAPHY | Age: 67
Discharge: HOME | End: 2022-11-14
Attending: INTERNAL MEDICINE
Payer: MEDICARE

## 2022-11-14 DIAGNOSIS — Z12.31 VISIT FOR SCREENING MAMMOGRAM: ICD-10-CM

## 2022-11-14 PROCEDURE — 77063 BREAST TOMOSYNTHESIS BI: CPT | Performed by: INTERNAL MEDICINE

## 2022-11-14 PROCEDURE — 77067 SCR MAMMO BI INCL CAD: CPT | Performed by: INTERNAL MEDICINE

## 2022-11-21 ENCOUNTER — HOSPITAL ENCOUNTER (OUTPATIENT)
Dept: MAMMOGRAPHY | Age: 67
Discharge: HOME | End: 2022-11-21
Attending: INTERNAL MEDICINE
Payer: MEDICARE

## 2022-11-21 DIAGNOSIS — R92.2 INCONCLUSIVE MAMMOGRAM: ICD-10-CM

## 2022-11-21 PROCEDURE — 77061 BREAST TOMOSYNTHESIS UNI: CPT | Performed by: INTERNAL MEDICINE

## 2022-11-21 PROCEDURE — 77065 DX MAMMO INCL CAD UNI: CPT | Performed by: INTERNAL MEDICINE

## 2022-12-14 DIAGNOSIS — I10 ESSENTIAL HYPERTENSION WITH GOAL BLOOD PRESSURE LESS THAN 140/90: ICD-10-CM

## 2022-12-14 DIAGNOSIS — E78.00 HYPERCHOLESTEREMIA: ICD-10-CM

## 2022-12-14 DIAGNOSIS — E11.9 TYPE 2 DIABETES MELLITUS WITHOUT COMPLICATION, WITHOUT LONG-TERM CURRENT USE OF INSULIN (HCC): ICD-10-CM

## 2022-12-15 RX ORDER — BISOPROLOL FUMARATE AND HYDROCHLOROTHIAZIDE 10; 6.25 MG/1; MG/1
2 TABLET ORAL DAILY
Qty: 180 TABLET | Refills: 0 | Status: SHIPPED | OUTPATIENT
Start: 2022-12-15

## 2023-01-10 ENCOUNTER — TELEPHONE (OUTPATIENT)
Dept: FAMILY MEDICINE CLINIC | Facility: CLINIC | Age: 68
End: 2023-01-10

## 2023-01-13 DIAGNOSIS — E78.00 HYPERCHOLESTEREMIA: ICD-10-CM

## 2023-01-13 RX ORDER — ROSUVASTATIN CALCIUM 10 MG/1
10 TABLET, COATED ORAL NIGHTLY
Qty: 90 TABLET | Refills: 0 | Status: SHIPPED | OUTPATIENT
Start: 2023-01-13

## 2023-01-13 NOTE — TELEPHONE ENCOUNTER
Cholesterol Medication Protocol Passed 01/13/2023 10:34 AM   Protocol Details  ALT < 80    ALT resulted within past year    Lipid panel within past 12 months    Appointment within past 12 or next 3 months     Last refill - 7/26/22 - #90 with 1 refill  Last ALT - 8/25/22 - 34  Last lipid panel - 8/25/22  Last office visit - 8/25/22  Refilled per protocol

## 2023-03-12 DIAGNOSIS — E11.9 TYPE 2 DIABETES MELLITUS WITHOUT COMPLICATION, WITHOUT LONG-TERM CURRENT USE OF INSULIN (HCC): ICD-10-CM

## 2023-03-13 RX ORDER — GLIMEPIRIDE 2 MG/1
TABLET ORAL
Qty: 180 TABLET | Refills: 1 | Status: SHIPPED | OUTPATIENT
Start: 2023-03-13

## 2023-03-13 NOTE — TELEPHONE ENCOUNTER
Diabetic Medication Protocol Failed 03/12/2023 10:24 AM   Protocol Details  HgBA1C procedure resulted in past 6 months    Last HgBA1C < 7.5    Appointment in past 6 or next 3 months    Microalbumin procedure in past 12 months or taking ACE/ARB        Last office visit:  08/25/22  Last refill:  09/19/22  #180, 1 refill  Last a1c:  08/25/22  Last micro:  08/25/22    No future appointments.   Sending mychart-must schedule office visit and fasting labs

## 2023-03-23 ENCOUNTER — TELEPHONE (OUTPATIENT)
Dept: FAMILY MEDICINE CLINIC | Facility: CLINIC | Age: 68
End: 2023-03-23

## 2023-03-23 DIAGNOSIS — E11.9 TYPE 2 DIABETES MELLITUS WITHOUT COMPLICATION, WITHOUT LONG-TERM CURRENT USE OF INSULIN (HCC): ICD-10-CM

## 2023-03-23 DIAGNOSIS — E78.00 HYPERCHOLESTEREMIA: ICD-10-CM

## 2023-03-23 DIAGNOSIS — I10 ESSENTIAL HYPERTENSION WITH GOAL BLOOD PRESSURE LESS THAN 140/90: ICD-10-CM

## 2023-03-23 DIAGNOSIS — E78.00 HYPERCHOLESTEREMIA: Primary | ICD-10-CM

## 2023-03-23 RX ORDER — BISOPROLOL FUMARATE AND HYDROCHLOROTHIAZIDE 10; 6.25 MG/1; MG/1
2 TABLET ORAL DAILY
Qty: 180 TABLET | Refills: 0 | Status: SHIPPED | OUTPATIENT
Start: 2023-03-23

## 2023-03-23 NOTE — TELEPHONE ENCOUNTER
Bisoprolol-hydrochlorothiazide 10-6.25 MG oral tab     Hypertension Medications Protocol Failed 03/23/2023 08:17 AM   Protocol Details  Appointment in past 6 or next 3 months    CMP or BMP in past 12 months    Last serum creatinine< 2.0      Last office visit: 8/25/22   No future appointments.   Last filled: 12/15/22 #180 with 0 refills   Last labs: 8/25/22 Crea-  1.02    Called and LM xavier Feliciano to call us back due for an office visit

## 2023-03-23 NOTE — TELEPHONE ENCOUNTER
Pt is coming for labs next Tuesday please put orders in. She is seeing dr holman on Thursday 3/30.  She needs the bisoprolol filled

## 2023-03-28 ENCOUNTER — LAB ENCOUNTER (OUTPATIENT)
Dept: LAB | Age: 68
End: 2023-03-28
Attending: INTERNAL MEDICINE
Payer: MEDICARE

## 2023-03-28 DIAGNOSIS — E11.9 TYPE 2 DIABETES MELLITUS WITHOUT COMPLICATION, WITHOUT LONG-TERM CURRENT USE OF INSULIN (HCC): ICD-10-CM

## 2023-03-28 DIAGNOSIS — I10 ESSENTIAL HYPERTENSION WITH GOAL BLOOD PRESSURE LESS THAN 140/90: ICD-10-CM

## 2023-03-28 DIAGNOSIS — E78.00 HYPERCHOLESTEREMIA: ICD-10-CM

## 2023-03-28 LAB
ALBUMIN SERPL-MCNC: 3.8 G/DL (ref 3.4–5)
ALBUMIN/GLOB SERPL: 1.1 {RATIO} (ref 1–2)
ALP LIVER SERPL-CCNC: 51 U/L
ALT SERPL-CCNC: 35 U/L
ANION GAP SERPL CALC-SCNC: 4 MMOL/L (ref 0–18)
AST SERPL-CCNC: 22 U/L (ref 15–37)
BASOPHILS # BLD AUTO: 0.04 X10(3) UL (ref 0–0.2)
BASOPHILS NFR BLD AUTO: 0.5 %
BILIRUB SERPL-MCNC: 0.8 MG/DL (ref 0.1–2)
BUN BLD-MCNC: 19 MG/DL (ref 7–18)
CALCIUM BLD-MCNC: 9.6 MG/DL (ref 8.5–10.1)
CHLORIDE SERPL-SCNC: 104 MMOL/L (ref 98–112)
CHOLEST SERPL-MCNC: 140 MG/DL (ref ?–200)
CO2 SERPL-SCNC: 31 MMOL/L (ref 21–32)
CREAT BLD-MCNC: 0.82 MG/DL
EOSINOPHIL # BLD AUTO: 0.22 X10(3) UL (ref 0–0.7)
EOSINOPHIL NFR BLD AUTO: 3 %
ERYTHROCYTE [DISTWIDTH] IN BLOOD BY AUTOMATED COUNT: 13.7 %
EST. AVERAGE GLUCOSE BLD GHB EST-MCNC: 134 MG/DL (ref 68–126)
FASTING PATIENT LIPID ANSWER: YES
FASTING STATUS PATIENT QL REPORTED: YES
GFR SERPLBLD BASED ON 1.73 SQ M-ARVRAT: 78 ML/MIN/1.73M2 (ref 60–?)
GLOBULIN PLAS-MCNC: 3.4 G/DL (ref 2.8–4.4)
GLUCOSE BLD-MCNC: 130 MG/DL (ref 70–99)
HBA1C MFR BLD: 6.3 % (ref ?–5.7)
HCT VFR BLD AUTO: 39.8 %
HDLC SERPL-MCNC: 76 MG/DL (ref 40–59)
HGB BLD-MCNC: 12.7 G/DL
IMM GRANULOCYTES # BLD AUTO: 0.02 X10(3) UL (ref 0–1)
IMM GRANULOCYTES NFR BLD: 0.3 %
LDLC SERPL CALC-MCNC: 45 MG/DL (ref ?–100)
LYMPHOCYTES # BLD AUTO: 1.83 X10(3) UL (ref 1–4)
LYMPHOCYTES NFR BLD AUTO: 24.6 %
MCH RBC QN AUTO: 30.3 PG (ref 26–34)
MCHC RBC AUTO-ENTMCNC: 31.9 G/DL (ref 31–37)
MCV RBC AUTO: 95 FL
MONOCYTES # BLD AUTO: 0.57 X10(3) UL (ref 0.1–1)
MONOCYTES NFR BLD AUTO: 7.7 %
NEUTROPHILS # BLD AUTO: 4.75 X10 (3) UL (ref 1.5–7.7)
NEUTROPHILS # BLD AUTO: 4.75 X10(3) UL (ref 1.5–7.7)
NEUTROPHILS NFR BLD AUTO: 63.9 %
NONHDLC SERPL-MCNC: 64 MG/DL (ref ?–130)
OSMOLALITY SERPL CALC.SUM OF ELEC: 292 MOSM/KG (ref 275–295)
PLATELET # BLD AUTO: 208 10(3)UL (ref 150–450)
POTASSIUM SERPL-SCNC: 4.1 MMOL/L (ref 3.5–5.1)
PROT SERPL-MCNC: 7.2 G/DL (ref 6.4–8.2)
RBC # BLD AUTO: 4.19 X10(6)UL
SODIUM SERPL-SCNC: 139 MMOL/L (ref 136–145)
TRIGL SERPL-MCNC: 106 MG/DL (ref 30–149)
VLDLC SERPL CALC-MCNC: 15 MG/DL (ref 0–30)
WBC # BLD AUTO: 7.4 X10(3) UL (ref 4–11)

## 2023-03-28 PROCEDURE — 80053 COMPREHEN METABOLIC PANEL: CPT

## 2023-03-28 PROCEDURE — 85025 COMPLETE CBC W/AUTO DIFF WBC: CPT

## 2023-03-28 PROCEDURE — 83036 HEMOGLOBIN GLYCOSYLATED A1C: CPT

## 2023-03-28 PROCEDURE — 80061 LIPID PANEL: CPT

## 2023-03-28 PROCEDURE — 36415 COLL VENOUS BLD VENIPUNCTURE: CPT

## 2023-03-30 ENCOUNTER — OFFICE VISIT (OUTPATIENT)
Dept: FAMILY MEDICINE CLINIC | Facility: CLINIC | Age: 68
End: 2023-03-30
Payer: MEDICARE

## 2023-03-30 VITALS
SYSTOLIC BLOOD PRESSURE: 138 MMHG | HEART RATE: 58 BPM | BODY MASS INDEX: 49 KG/M2 | RESPIRATION RATE: 22 BRPM | WEIGHT: 248.38 LBS | TEMPERATURE: 98 F | OXYGEN SATURATION: 98 % | DIASTOLIC BLOOD PRESSURE: 82 MMHG

## 2023-03-30 DIAGNOSIS — I10 PRIMARY HYPERTENSION: ICD-10-CM

## 2023-03-30 DIAGNOSIS — E11.9 TYPE 2 DIABETES MELLITUS WITHOUT COMPLICATION, WITHOUT LONG-TERM CURRENT USE OF INSULIN (HCC): ICD-10-CM

## 2023-03-30 DIAGNOSIS — R06.02 SOB (SHORTNESS OF BREATH) ON EXERTION: Primary | ICD-10-CM

## 2023-03-30 DIAGNOSIS — E78.00 HYPERCHOLESTEREMIA: ICD-10-CM

## 2023-03-30 DIAGNOSIS — G47.33 OSA (OBSTRUCTIVE SLEEP APNEA): ICD-10-CM

## 2023-03-30 LAB
ATRIAL RATE: 48 BPM
P AXIS: 79 DEGREES
P-R INTERVAL: 150 MS
Q-T INTERVAL: 474 MS
QRS DURATION: 100 MS
QTC CALCULATION (BEZET): 423 MS
R AXIS: -30 DEGREES
T AXIS: -3 DEGREES
VENTRICULAR RATE: 48 BPM

## 2023-03-30 PROCEDURE — 93000 ELECTROCARDIOGRAM COMPLETE: CPT | Performed by: INTERNAL MEDICINE

## 2023-03-30 PROCEDURE — 99214 OFFICE O/P EST MOD 30 MIN: CPT | Performed by: INTERNAL MEDICINE

## 2023-04-10 DIAGNOSIS — E78.00 HYPERCHOLESTEREMIA: ICD-10-CM

## 2023-04-10 RX ORDER — ROSUVASTATIN CALCIUM 10 MG/1
TABLET, COATED ORAL
Qty: 90 TABLET | Refills: 0 | Status: SHIPPED | OUTPATIENT
Start: 2023-04-10

## 2023-04-10 NOTE — TELEPHONE ENCOUNTER
Rosuvastatin     LOV  3-30-23    LAST LAB  3-28-23  Chem Profile ALT 35                   3-28-23 Lipids    LAST RX  1-13-23 #90    Next OV No future appointments.       PROTOCOL  Cholesterol Medication Protocol Passed 04/10/2023 04:20 AM   Protocol Details  ALT < 80    ALT resulted within past year    Lipid panel within past 12 months    Appointment within past 12 or next 3 months

## 2023-05-19 RX ORDER — PIOGLITAZONEHYDROCHLORIDE 30 MG/1
30 TABLET ORAL DAILY
Qty: 90 TABLET | Refills: 1 | Status: SHIPPED | OUTPATIENT
Start: 2023-05-19 | End: 2024-05-13

## 2023-05-19 NOTE — TELEPHONE ENCOUNTER
Diabetic Medication Protocol Passed 05/19/2023 07:19 AM   Protocol Details  HgBA1C procedure resulted in past 6 months    Last HgBA1C < 7.5    Microalbumin procedure in past 12 months or taking ACE/ARB    Appointment in past 6 or next 3 months     Last visit 3/30/23  No future appointments.   A1C  3/28/23  6.3  Refilled per protocol

## 2023-06-16 DIAGNOSIS — E11.9 TYPE 2 DIABETES MELLITUS WITHOUT COMPLICATION, WITHOUT LONG-TERM CURRENT USE OF INSULIN (HCC): ICD-10-CM

## 2023-06-16 DIAGNOSIS — E78.00 HYPERCHOLESTEREMIA: ICD-10-CM

## 2023-06-16 DIAGNOSIS — I10 ESSENTIAL HYPERTENSION WITH GOAL BLOOD PRESSURE LESS THAN 140/90: ICD-10-CM

## 2023-06-16 RX ORDER — BISOPROLOL FUMARATE AND HYDROCHLOROTHIAZIDE 10; 6.25 MG/1; MG/1
2 TABLET ORAL DAILY
Qty: 180 TABLET | Refills: 0 | Status: SHIPPED | OUTPATIENT
Start: 2023-06-16

## 2023-06-16 RX ORDER — METFORMIN HYDROCHLORIDE 750 MG/1
1500 TABLET, EXTENDED RELEASE ORAL
Qty: 180 TABLET | Refills: 3 | Status: SHIPPED | OUTPATIENT
Start: 2023-06-16

## 2023-06-16 NOTE — TELEPHONE ENCOUNTER
Metformin  mg     Diabetic Medication Protocol Passed 06/16/2023 08:17 AM   Protocol Details  HgBA1C procedure resulted in past 6 months    Last HgBA1C < 7.5    Microalbumin procedure in past 12 months or taking ACE/ARB    Appointment in past 6 or next 3 months        Last office visit:  3/30/23  No future appointments.   Last filled: 6/24/22   #180 with 3 refills   Last labs:  3/28/23  HGBA1C: 6.3

## 2023-06-16 NOTE — TELEPHONE ENCOUNTER
BISOPROLOL-HYDROCHLOROTHIAZIDE 10-6.25 MG Oral Tab   Hypertension Medications Protocol Passed 06/16/2023 09:44 AM   Protocol Details  CMP or BMP in past 12 months    Last serum creatinine< 2.0    Appointment in past 6 or next 3 months      Last office visit:  3/30/23  No future appointments.   Last filled:  3/23/23  #180 with 0 refills   Last labs:  3/28/23  Crea: 0.82

## 2023-07-06 ENCOUNTER — PATIENT MESSAGE (OUTPATIENT)
Dept: FAMILY MEDICINE CLINIC | Facility: CLINIC | Age: 68
End: 2023-07-06

## 2023-07-06 DIAGNOSIS — E78.00 HYPERCHOLESTEREMIA: ICD-10-CM

## 2023-07-06 RX ORDER — ROSUVASTATIN CALCIUM 10 MG/1
10 TABLET, COATED ORAL NIGHTLY
Qty: 90 TABLET | Refills: 0 | Status: SHIPPED | OUTPATIENT
Start: 2023-07-06

## 2023-07-06 NOTE — TELEPHONE ENCOUNTER
Last refill: 04/10/23  Qty: 90  W/ 0 refills   Last ov: 03/30/23    Requested Prescriptions     Pending Prescriptions Disp Refills    rosuvastatin 10 MG Oral Tab 90 tablet 0     Sig: Take 1 tablet (10 mg total) by mouth nightly.      Future Appointments   Date Time Provider Trista Dunn   8/30/2023  9:30 AM Manav Saravia MD [facility] [place]

## 2023-07-07 ENCOUNTER — TELEPHONE (OUTPATIENT)
Dept: FAMILY MEDICINE CLINIC | Facility: CLINIC | Age: 68
End: 2023-07-07

## 2023-07-11 DIAGNOSIS — M17.0 PRIMARY OSTEOARTHRITIS OF BOTH KNEES: ICD-10-CM

## 2023-07-11 RX ORDER — MELOXICAM 15 MG/1
15 TABLET ORAL DAILY
Qty: 90 TABLET | Refills: 3 | Status: SHIPPED | OUTPATIENT
Start: 2023-07-11

## 2023-07-11 NOTE — TELEPHONE ENCOUNTER
No Protocol    Meloxicam 15 MG Oral Tab     Last office visit:  3/30/23    Future Appointments   Date Time Provider Trista Dunn   8/24/2023 11:00 AM Sita Perez MD [facility] [place] [facility] [place]   8/30/2023  9:30 AM Davey Alaniz MD [facility] [place]   Last filled:  7/26/22  #90 with 3 refills   Last labs:  3/28/23

## 2023-08-24 ENCOUNTER — OFFICE VISIT (OUTPATIENT)
Dept: FAMILY MEDICINE CLINIC | Facility: CLINIC | Age: 68
End: 2023-08-24
Payer: MEDICARE

## 2023-08-24 VITALS
OXYGEN SATURATION: 98 % | RESPIRATION RATE: 18 BRPM | SYSTOLIC BLOOD PRESSURE: 136 MMHG | HEART RATE: 58 BPM | TEMPERATURE: 98 F | HEIGHT: 61 IN | WEIGHT: 256.38 LBS | BODY MASS INDEX: 48.4 KG/M2 | DIASTOLIC BLOOD PRESSURE: 80 MMHG

## 2023-08-24 DIAGNOSIS — G47.33 OBSTRUCTIVE SLEEP APNEA: Primary | ICD-10-CM

## 2023-08-24 PROCEDURE — 99214 OFFICE O/P EST MOD 30 MIN: CPT | Performed by: NURSE PRACTITIONER

## 2023-08-24 NOTE — PATIENT INSTRUCTIONS
Continue sleep therapy. Follow-up in 6 months or after back from [facility] - sooner if needed. Advised if still with sleep apnea and not using CPAP has a  7 fold increase in risk of heart attack, stroke, abnormal heart rhythm  and death,  increased risk of driving accidents. Advised to refrain from driving when sleepy. COMPLIANCE is required by insurance for 4 hours a night most nights of the week. Recommend weight loss, and maintain and optimal BMI with Exercise 30 minutes most days of the week to target heart rate . Advised patient to change filters,masks,hoses  and tubes and equiptment on a  regular schedule. Filters and seals shall be changed every 1 month,  Hoses every 3 months,   CPAP mask and humidifier  chamber changed every 6 month  with the Durable medical equipment provider.

## 2023-08-25 ENCOUNTER — TELEPHONE (OUTPATIENT)
Dept: FAMILY MEDICINE CLINIC | Facility: CLINIC | Age: 68
End: 2023-08-25

## 2023-08-25 DIAGNOSIS — E11.9 TYPE 2 DIABETES MELLITUS WITHOUT COMPLICATION, WITHOUT LONG-TERM CURRENT USE OF INSULIN (HCC): ICD-10-CM

## 2023-08-25 DIAGNOSIS — E78.00 HYPERCHOLESTEREMIA: Primary | ICD-10-CM

## 2023-08-25 DIAGNOSIS — I10 ESSENTIAL HYPERTENSION WITH GOAL BLOOD PRESSURE LESS THAN 140/90: ICD-10-CM

## 2023-08-25 NOTE — TELEPHONE ENCOUNTER
Are the labs appropriate for upcoming lab draw?   Future Appointments   Date Time Provider Trista Dunn   8/29/2023  8:30 AM REF [facility] [place]   8/30/2023  9:30 AM Kelly Whitaker MD [facility] [place]

## 2023-08-29 ENCOUNTER — LABORATORY ENCOUNTER (OUTPATIENT)
Dept: LAB | Age: 68
End: 2023-08-29
Attending: INTERNAL MEDICINE
Payer: MEDICARE

## 2023-08-29 DIAGNOSIS — E11.9 TYPE 2 DIABETES MELLITUS WITHOUT COMPLICATION, WITHOUT LONG-TERM CURRENT USE OF INSULIN (HCC): ICD-10-CM

## 2023-08-29 DIAGNOSIS — I10 ESSENTIAL HYPERTENSION WITH GOAL BLOOD PRESSURE LESS THAN 140/90: ICD-10-CM

## 2023-08-29 DIAGNOSIS — E78.00 HYPERCHOLESTEREMIA: ICD-10-CM

## 2023-08-29 LAB
ALBUMIN SERPL-MCNC: 3.7 G/DL (ref 3.4–5)
ALBUMIN/GLOB SERPL: 1.1 {RATIO} (ref 1–2)
ALP LIVER SERPL-CCNC: 49 U/L
ALT SERPL-CCNC: 30 U/L
ANION GAP SERPL CALC-SCNC: 8 MMOL/L (ref 0–18)
AST SERPL-CCNC: 19 U/L (ref 15–37)
BASOPHILS # BLD AUTO: 0.04 X10(3) UL (ref 0–0.2)
BASOPHILS NFR BLD AUTO: 0.6 %
BILIRUB SERPL-MCNC: 0.6 MG/DL (ref 0.1–2)
BUN BLD-MCNC: 20 MG/DL (ref 7–18)
CALCIUM BLD-MCNC: 9.8 MG/DL (ref 8.5–10.1)
CHLORIDE SERPL-SCNC: 105 MMOL/L (ref 98–112)
CHOLEST SERPL-MCNC: 135 MG/DL (ref ?–200)
CO2 SERPL-SCNC: 25 MMOL/L (ref 21–32)
CREAT BLD-MCNC: 0.91 MG/DL
EGFRCR SERPLBLD CKD-EPI 2021: 69 ML/MIN/1.73M2 (ref 60–?)
EOSINOPHIL # BLD AUTO: 0.18 X10(3) UL (ref 0–0.7)
EOSINOPHIL NFR BLD AUTO: 2.7 %
ERYTHROCYTE [DISTWIDTH] IN BLOOD BY AUTOMATED COUNT: 13.5 %
EST. AVERAGE GLUCOSE BLD GHB EST-MCNC: 128 MG/DL (ref 68–126)
FASTING PATIENT LIPID ANSWER: YES
FASTING STATUS PATIENT QL REPORTED: YES
GLOBULIN PLAS-MCNC: 3.5 G/DL (ref 2.8–4.4)
GLUCOSE BLD-MCNC: 122 MG/DL (ref 70–99)
HBA1C MFR BLD: 6.1 % (ref ?–5.7)
HCT VFR BLD AUTO: 40.9 %
HDLC SERPL-MCNC: 71 MG/DL (ref 40–59)
HGB BLD-MCNC: 12.6 G/DL
IMM GRANULOCYTES # BLD AUTO: 0.02 X10(3) UL (ref 0–1)
IMM GRANULOCYTES NFR BLD: 0.3 %
LDLC SERPL CALC-MCNC: 41 MG/DL (ref ?–100)
LYMPHOCYTES # BLD AUTO: 1.89 X10(3) UL (ref 1–4)
LYMPHOCYTES NFR BLD AUTO: 28.3 %
MCH RBC QN AUTO: 30.3 PG (ref 26–34)
MCHC RBC AUTO-ENTMCNC: 30.8 G/DL (ref 31–37)
MCV RBC AUTO: 98.3 FL
MONOCYTES # BLD AUTO: 0.62 X10(3) UL (ref 0.1–1)
MONOCYTES NFR BLD AUTO: 9.3 %
NEUTROPHILS # BLD AUTO: 3.93 X10 (3) UL (ref 1.5–7.7)
NEUTROPHILS # BLD AUTO: 3.93 X10(3) UL (ref 1.5–7.7)
NEUTROPHILS NFR BLD AUTO: 58.8 %
NONHDLC SERPL-MCNC: 64 MG/DL (ref ?–130)
OSMOLALITY SERPL CALC.SUM OF ELEC: 290 MOSM/KG (ref 275–295)
PLATELET # BLD AUTO: 195 10(3)UL (ref 150–450)
POTASSIUM SERPL-SCNC: 4.2 MMOL/L (ref 3.5–5.1)
PROT SERPL-MCNC: 7.2 G/DL (ref 6.4–8.2)
RBC # BLD AUTO: 4.16 X10(6)UL
SODIUM SERPL-SCNC: 138 MMOL/L (ref 136–145)
TRIGL SERPL-MCNC: 135 MG/DL (ref 30–149)
VLDLC SERPL CALC-MCNC: 19 MG/DL (ref 0–30)
WBC # BLD AUTO: 6.7 X10(3) UL (ref 4–11)

## 2023-08-29 PROCEDURE — 80061 LIPID PANEL: CPT

## 2023-08-29 PROCEDURE — 36415 COLL VENOUS BLD VENIPUNCTURE: CPT

## 2023-08-29 PROCEDURE — 80053 COMPREHEN METABOLIC PANEL: CPT

## 2023-08-29 PROCEDURE — 83036 HEMOGLOBIN GLYCOSYLATED A1C: CPT

## 2023-08-29 PROCEDURE — 85025 COMPLETE CBC W/AUTO DIFF WBC: CPT

## 2023-08-30 ENCOUNTER — OFFICE VISIT (OUTPATIENT)
Dept: FAMILY MEDICINE CLINIC | Facility: CLINIC | Age: 68
End: 2023-08-30
Payer: MEDICARE

## 2023-08-30 VITALS
WEIGHT: 254.25 LBS | RESPIRATION RATE: 18 BRPM | DIASTOLIC BLOOD PRESSURE: 80 MMHG | TEMPERATURE: 98 F | HEIGHT: 61 IN | OXYGEN SATURATION: 98 % | SYSTOLIC BLOOD PRESSURE: 138 MMHG | BODY MASS INDEX: 48 KG/M2 | HEART RATE: 53 BPM

## 2023-08-30 DIAGNOSIS — Z12.31 ENCOUNTER FOR SCREENING MAMMOGRAM FOR MALIGNANT NEOPLASM OF BREAST: ICD-10-CM

## 2023-08-30 DIAGNOSIS — M25.561 BILATERAL CHRONIC KNEE PAIN: ICD-10-CM

## 2023-08-30 DIAGNOSIS — I10 ESSENTIAL HYPERTENSION: Primary | ICD-10-CM

## 2023-08-30 DIAGNOSIS — M25.562 BILATERAL CHRONIC KNEE PAIN: ICD-10-CM

## 2023-08-30 DIAGNOSIS — G89.29 BILATERAL CHRONIC KNEE PAIN: ICD-10-CM

## 2023-08-30 DIAGNOSIS — E66.01 MORBID OBESITY WITH BMI OF 40.0-44.9, ADULT (HCC): ICD-10-CM

## 2023-08-30 DIAGNOSIS — E11.9 TYPE 2 DIABETES MELLITUS WITHOUT COMPLICATION, WITHOUT LONG-TERM CURRENT USE OF INSULIN (HCC): ICD-10-CM

## 2023-08-30 DIAGNOSIS — E78.00 HYPERCHOLESTEREMIA: ICD-10-CM

## 2023-08-30 DIAGNOSIS — Z00.00 ENCOUNTER FOR ANNUAL HEALTH EXAMINATION: ICD-10-CM

## 2023-08-30 DIAGNOSIS — G47.33 OBSTRUCTIVE SLEEP APNEA: ICD-10-CM

## 2023-08-30 LAB
CREAT UR-SCNC: 139 MG/DL
MICROALBUMIN UR-MCNC: 1.93 MG/DL
MICROALBUMIN/CREAT 24H UR-RTO: 13.9 UG/MG (ref ?–30)

## 2023-08-30 PROCEDURE — 90471 IMMUNIZATION ADMIN: CPT | Performed by: INTERNAL MEDICINE

## 2023-08-30 PROCEDURE — G0439 PPPS, SUBSEQ VISIT: HCPCS | Performed by: INTERNAL MEDICINE

## 2023-08-30 PROCEDURE — 1125F AMNT PAIN NOTED PAIN PRSNT: CPT | Performed by: INTERNAL MEDICINE

## 2023-08-30 PROCEDURE — 82043 UR ALBUMIN QUANTITATIVE: CPT | Performed by: INTERNAL MEDICINE

## 2023-08-30 PROCEDURE — 82570 ASSAY OF URINE CREATININE: CPT | Performed by: INTERNAL MEDICINE

## 2023-08-30 PROCEDURE — 90636 HEP A/HEP B VACC ADULT IM: CPT | Performed by: INTERNAL MEDICINE

## 2023-08-30 RX ORDER — METFORMIN HYDROCHLORIDE 750 MG/1
750 TABLET, EXTENDED RELEASE ORAL
Qty: 90 TABLET | Refills: 3 | Status: SHIPPED | OUTPATIENT
Start: 2023-08-30 | End: 2024-08-24

## 2023-08-30 RX ORDER — ROSUVASTATIN CALCIUM 5 MG/1
5 TABLET, COATED ORAL NIGHTLY
Qty: 90 TABLET | Refills: 1 | Status: SHIPPED | OUTPATIENT
Start: 2023-08-30 | End: 2024-08-24

## 2023-09-10 DIAGNOSIS — E11.9 TYPE 2 DIABETES MELLITUS WITHOUT COMPLICATION, WITHOUT LONG-TERM CURRENT USE OF INSULIN (HCC): ICD-10-CM

## 2023-09-10 DIAGNOSIS — E78.00 HYPERCHOLESTEREMIA: ICD-10-CM

## 2023-09-10 DIAGNOSIS — I10 ESSENTIAL HYPERTENSION WITH GOAL BLOOD PRESSURE LESS THAN 140/90: ICD-10-CM

## 2023-09-11 RX ORDER — BISOPROLOL FUMARATE AND HYDROCHLOROTHIAZIDE 10; 6.25 MG/1; MG/1
2 TABLET ORAL DAILY
Qty: 180 TABLET | Refills: 0 | Status: SHIPPED | OUTPATIENT
Start: 2023-09-11

## 2023-09-11 NOTE — TELEPHONE ENCOUNTER
Last refill: 06/16/23  Qty: 180  W/ 0 refills  Last ov: 08/30/23    Requested Prescriptions     Pending Prescriptions Disp Refills    BISOPROLOL-HYDROCHLOROTHIAZIDE 10-6.25 MG Oral Tab [Pharmacy Med Name: BISOPROLOL/HCTZ 10MG/6.25MG TABS] 180 tablet 0     Sig: TAKE 2 TABLETS BY MOUTH DAILY     Future Appointments   Date Time Provider Trista Dunn   10/3/2023 11:15 AM Chelo [place], PT [facility] [place]   10/6/2023 12:45 PM Chelo Fresno, PT [facility] [place]   10/9/2023 11:45 AM Chelo Fresno, PT [facility] [place]   10/12/2023 11:00 AM Chelo Fresno, PT [facility] [place]   10/23/2023 11:45 AM Chelo Fresno, PT [facility] [place]   10/26/2023 11:00 AM Chelo Fresno, PT [facility] [place]

## 2023-09-12 DIAGNOSIS — I10 ESSENTIAL HYPERTENSION WITH GOAL BLOOD PRESSURE LESS THAN 140/90: ICD-10-CM

## 2023-09-12 DIAGNOSIS — E11.9 TYPE 2 DIABETES MELLITUS WITHOUT COMPLICATION, WITHOUT LONG-TERM CURRENT USE OF INSULIN (HCC): ICD-10-CM

## 2023-09-12 DIAGNOSIS — E78.00 HYPERCHOLESTEREMIA: ICD-10-CM

## 2023-09-13 RX ORDER — LISINOPRIL 20 MG/1
20 TABLET ORAL DAILY
Qty: 90 TABLET | Refills: 2 | Status: SHIPPED | OUTPATIENT
Start: 2023-09-13

## 2023-09-13 RX ORDER — GLIMEPIRIDE 2 MG/1
TABLET ORAL
Qty: 180 TABLET | Refills: 1 | Status: SHIPPED | OUTPATIENT
Start: 2023-09-13

## 2023-10-03 ENCOUNTER — OFFICE VISIT (OUTPATIENT)
Dept: PHYSICAL THERAPY | Age: 68
End: 2023-10-03
Attending: INTERNAL MEDICINE
Payer: MEDICARE

## 2023-10-03 DIAGNOSIS — M25.561 BILATERAL CHRONIC KNEE PAIN: Primary | ICD-10-CM

## 2023-10-03 DIAGNOSIS — G89.29 BILATERAL CHRONIC KNEE PAIN: Primary | ICD-10-CM

## 2023-10-03 DIAGNOSIS — M25.562 BILATERAL CHRONIC KNEE PAIN: Primary | ICD-10-CM

## 2023-10-03 PROCEDURE — 97110 THERAPEUTIC EXERCISES: CPT

## 2023-10-03 PROCEDURE — 97161 PT EVAL LOW COMPLEX 20 MIN: CPT

## 2023-10-06 ENCOUNTER — OFFICE VISIT (OUTPATIENT)
Dept: PHYSICAL THERAPY | Age: 68
End: 2023-10-06
Attending: INTERNAL MEDICINE
Payer: MEDICARE

## 2023-10-06 PROCEDURE — 97110 THERAPEUTIC EXERCISES: CPT

## 2023-10-06 NOTE — PROGRESS NOTES
Diagnosis:   Bilateral chronic knee pain (M25.561,M25.562,G89.29           Referring Provider: Isidro Bentley  Date of Evaluation:    10/2/23    Precautions:  None Next MD visit:   none scheduled  Date of Surgery: n/a   Insurance Primary/Secondary: MEDICARE / [facility]     # Auth Visits: Medical Necessity progress notes every 10 visits            Subjective: Pt states she is feeling ok today, a little achy and that she walked 30 mins yesterday but did not do the exercises as much as she would have liked. Pain: 4/10      Objective: All of the below objective measures are from evaluation for reference:  AROM: (* denotes performed with pain)  Knee   Flexion: R 110; L 110  Extension: R 0; L lacking 1 deg      TUG test no AD 14.0 secs      Gait: pt ambulates on level ground with antalgia and trendelenburg/waddle  Balance: SLS: R 7 sec, L 2 sec    Assessment: Basic quad, glut and core strengthening for increased force production, STS reviewed for LE work vs UE leverage. Goals:    (to be met in 10 visits)  Pt will improve knee extension ROM to 0 deg to allow proper heel strike during gait and terminal knee extension in stance   Pt will improve knee AROM flexion to >110 degrees to improve ability to perform transfers with greater ease. Pt will improve quad strength to 4+/5 to ascend 1 flight of stairs reciprocally without UE assist   Pt will increase hip and knee strength to grossly 4+/5 to be able to get up and down from the floor safely   Pt will demonstrate increased hip ER/ABD strength to 4+/5 to perform stepping and squatting activities without excessive femoral IR/ADD   Pt will improve SLS to 10 s to improve safety with gait on uneven surfaces such as grass and gravel  Pt will demonstrate increased gait efficiency as evidenced by TUG test LRAD to < 11 secs.   Pt will be independent and compliant with comprehensive HEP to maintain progress achieved in PT     Plan: Address ROM deficits, soft tissue length, lateral hip and pelvic stability for improved SLS time, force production and greater gait efficiency. Add leg press, Heel leading tap overs and stair taps for increased hip and knee flexion for gait biomechanics. Date: 10/6/2023  TX#: 2/10 Date:                 TX#: 3/ Date:                 TX#: 4/ Date:                 TX#: 5/ Date: Tx#: 6/   THERE EX:  Warm up Nu step 8 mins arms and legs level 3 seat 7  Standing gastroc stretch 30 secs 3x BLEs  SAQs 10x each LE  Manual hamstring, glut, piriformis stretch 30 secs 3x each LE  PROM B knees 5 mins   Hook lying green band 10x2 hip ABD  Hip ADD ball squeeze 10x 3 sec hold  Hip ADD ball squeeze bridge 10x   STS with forward weight shift mat table 22 secs 15x                            HEP: Denoted with *   Access Code: [email or web address]  URL: [email or web address]  Date: 10/03/2023  Prepared by: Lawrence Stage     Exercises  - Seated Hip Abduction with Resistance  - 1 x daily - 7 x weekly - 3 sets - 10 reps - 2 hold  - Squat with Chair Touch  - 1 x daily - 7 x weekly - 2 sets - 10 reps    Charges: 3 ther ex       Total Timed Treatment: 45 min  Total Treatment Time: 45 min

## 2023-10-09 ENCOUNTER — OFFICE VISIT (OUTPATIENT)
Dept: PHYSICAL THERAPY | Age: 68
End: 2023-10-09
Attending: INTERNAL MEDICINE
Payer: MEDICARE

## 2023-10-09 PROCEDURE — 97110 THERAPEUTIC EXERCISES: CPT

## 2023-10-09 NOTE — PROGRESS NOTES
Diagnosis:   Bilateral chronic knee pain (M25.561,M25.562,G89.29           Referring Provider: Julieta Mcneal  Date of Evaluation:    10/2/23    Precautions:  None Next MD visit:   Gel injections 11/3,11/10/11/17 Dr. Nadege Cole  Date of Surgery: n/a   Insurance Primary/Secondary: MEDICARE / Alexandru Weller     # Auth Visits: Medical Necessity progress notes every 10 visits            Subjective: Pt states she saw Dr. 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 Pain: 4/10      Objective: All of the below objective measures are from evaluation for reference:  AROM: (* denotes performed with pain)  Knee   Flexion: R 110; L 110  Extension: R 0; L lacking 1 deg      TUG test no AD 14.0 secs      Gait: pt ambulates on level ground with antalgia and trendelenburg/waddle  Balance: SLS: R 7 sec, L 2 sec    Assessment: Added leg press for closed chain ther ex along with stair taps and step ups. Pt completes with only min cues for hip and knee flexion on stair vs trunk flexion. Goals:    (to be met in 10 visits)  Pt will improve knee extension ROM to 0 deg to allow proper heel strike during gait and terminal knee extension in stance   Pt will improve knee AROM flexion to >110 degrees to improve ability to perform transfers with greater ease. Pt will improve quad strength to 4+/5 to ascend 1 flight of stairs reciprocally without UE assist   Pt will increase hip and knee strength to grossly 4+/5 to be able to get up and down from the floor safely   Pt will demonstrate increased hip ER/ABD strength to 4+/5 to perform stepping and squatting activities without excessive femoral IR/ADD   Pt will improve SLS to 10 s to improve safety with gait on uneven surfaces such as grass and gravel  Pt will demonstrate increased gait efficiency as evidenced by TUG test LRAD to < 11 secs.   Pt will be independent and compliant with comprehensive HEP to maintain progress achieved in PT     Plan: Address ROM deficits, soft tissue length, lateral hip and pelvic stability for improved SLS time, force production and greater gait efficiency. Add leg press, Heel leading tap overs and stair taps for increased hip and knee flexion for gait biomechanics. Date: 10/6/2023  TX#: 2/10 Date:  10/9/23        TX#: 3/10 Date:                 TX#: 4/ Date:                 TX#: 5/ Date: Tx#: 6/   THERE EX:  Warm up Nu step 8 mins arms and legs level 3 seat 7  Standing gastroc stretch 30 secs 3x BLEs  SAQs 10x each LE  Manual hamstring, glut, piriformis stretch 30 secs 3x each LE  PROM B knees 5 mins   Hook lying green band 10x2 hip ABD  Hip ADD ball squeeze 10x 3 sec hold  Hip ADD ball squeeze bridge 10x   STS with forward weight shift mat table 22 secs 15x THERE EX:  Warm up Nu step 10 mins arms and legs level 3 seat 7  Standing gastroc stretch 30 secs 3x BLEs  SAQs 10x 2 each LE 1#  Manual hamstring, glut, piriformis stretch 30 secs 3x each LE  PROM B knees 5 mins   Hook lying green band 10x2 hip ABD  Hip ADD ball squeeze 10x 3 sec hold  Hip ADD ball squeeze bridge 10x 2  Shuttle leg press 62 lbs 10x3  4 in step ups 10x eac LE BUE support       NEURO RE ED:  4 in stair taps 15x eahc LE                    HEP: Denoted with *   Access Code: [email or web address]  URL: [email or web address]  Date: 10/03/2023  Prepared by: Felicia Bartholomew     Exercises  - Seated Hip Abduction with Resistance  - 1 x daily - 7 x weekly - 3 sets - 10 reps - 2 hold  - Squat with Chair Touch  - 1 x daily - 7 x weekly - 2 sets - 10 reps  Bridges with ball 10x2 4x/week  Charges: 3 ther ex       Total Timed Treatment: 45 min  Total Treatment Time: 45 min

## 2023-10-12 ENCOUNTER — APPOINTMENT (OUTPATIENT)
Dept: PHYSICAL THERAPY | Age: 68
End: 2023-10-12
Attending: INTERNAL MEDICINE
Payer: MEDICARE

## 2023-10-12 ENCOUNTER — TELEPHONE (OUTPATIENT)
Dept: PHYSICAL THERAPY | Age: 68
End: 2023-10-12

## 2023-10-23 ENCOUNTER — OFFICE VISIT (OUTPATIENT)
Dept: PHYSICAL THERAPY | Age: 68
End: 2023-10-23
Attending: INTERNAL MEDICINE
Payer: MEDICARE

## 2023-10-23 PROCEDURE — 97110 THERAPEUTIC EXERCISES: CPT

## 2023-10-23 PROCEDURE — 97112 NEUROMUSCULAR REEDUCATION: CPT

## 2023-10-23 NOTE — PROGRESS NOTES
Diagnosis:   Bilateral chronic knee pain (M25.561,M25.562,G89.29           Referring Provider: Gómez Sage  Date of Evaluation:    10/2/23    Precautions:  None Next MD visit:   Gel injections 11/3,11/10/11/17 Dr. Tony Daley  Date of Surgery: n/a   Insurance Primary/Secondary: MEDICARE / Angélica Golden     # Auth Visits: Medical Necessity progress notes every 10 visits            Subjective: Pt states she is feeling about the same and looking forward to gel injections. Pain: 5/10      Objective: All of the below objective measures are from evaluation for reference:  AROM: (* denotes performed with pain)  Knee   Flexion: R 110; L 110  Extension: R 0; L lacking 1 deg      TUG test no AD 14.0 secs      Gait: pt ambulates on level ground with antalgia and trendelenburg/waddle  Balance: SLS: R 7 sec, L 2 sec    Assessment: Noted instability with lunge onto LLE single UE support required. Added lateral walking to increase pelvic stability. Goals:    (to be met in 10 visits)  Pt will improve knee extension ROM to 0 deg to allow proper heel strike during gait and terminal knee extension in stance   Pt will improve knee AROM flexion to >110 degrees to improve ability to perform transfers with greater ease. Pt will improve quad strength to 4+/5 to ascend 1 flight of stairs reciprocally without UE assist   Pt will increase hip and knee strength to grossly 4+/5 to be able to get up and down from the floor safely   Pt will demonstrate increased hip ER/ABD strength to 4+/5 to perform stepping and squatting activities without excessive femoral IR/ADD   Pt will improve SLS to 10 s to improve safety with gait on uneven surfaces such as grass and gravel  Pt will demonstrate increased gait efficiency as evidenced by TUG test LRAD to < 11 secs.   Pt will be independent and compliant with comprehensive HEP to maintain progress achieved in PT     Plan: Address ROM deficits, soft tissue length, lateral hip and pelvic stability for improved SLS time, force production and greater gait efficiency. Add leg press, Heel leading tap overs and stair taps for increased hip and knee flexion for gait biomechanics. Date: 10/6/2023  TX#: 2/10 Date:  10/9/23        TX#: 3/10 Date: 10/23/23                TX#: 4/10 Date:                 TX#: 5/ Date: Tx#: 6/ THERE EX:  Warm up Nu step 8 mins arms and legs level 3 seat 7  Standing gastroc stretch 30 secs 3x BLEs  SAQs 10x each LE  Manual hamstring, glut, piriformis stretch 30 secs 3x each LE  PROM B knees 5 mins   Hook lying green band 10x2 hip ABD  Hip ADD ball squeeze 10x 3 sec hold  Hip ADD ball squeeze bridge 10x   STS with forward weight shift mat table 22 secs 15x THERE EX:  Warm up Nu step 10 mins arms and legs level 3 seat 7  Standing gastroc stretch 30 secs 3x BLEs  SAQs 10x 2 each LE 1#  Manual hamstring, glut, piriformis stretch 30 secs 3x each LE  PROM B knees 5 mins   Hook lying green band 10x2 hip ABD  Hip ADD ball squeeze 10x 3 sec hold  Hip ADD ball squeeze bridge 10x 2  Shuttle leg press 62 lbs 10x3  4 in step ups 10x eac LE BUE support THERE EX:  Warm up Nu step 10 mins arms and legs level 3 seat 7  Standing gastroc stretch 30 secs 3x BLEs  SAQs 10x 2 each LE 1#  Manual hamstring, glut, piriformis stretch 30 secs 3x each LE  PROM B knees 5 mins   Hook lying green band 10x2 hip ABD  Hip ADD ball squeeze 10x 3 sec hold  Bridge green band 10x2   Shuttle leg press 68 lbs 10x3  4 in step ups 10x eac LE BUE support  Lateral walking 20ftx2 each direction      NEURO RE ED:  4 in stair taps 15x eahc LE NEURO RE ED:  4 in stair taps 10x2 eahc LE  4 in lunge and step off single UE support 10x each LE                     HEP: Denoted with *   Access Code: [email or web address]  URL: [email or web address]  Date: 10/03/2023  Prepared by: Ronn Conn  - Seated Hip Abduction with Resistance  - 1 x daily - 7 x weekly - 3 sets - 10 reps - 2 hold  - Squat with Chair Touch  - 1 x daily - 7 x weekly - 2 sets - 10 reps  Bridges with ball 10x2 4x/week  Charges: 2 ther ex  1 neuro re ed     Total Timed Treatment: 45 min  Total Treatment Time: 45 min

## 2023-10-26 ENCOUNTER — OFFICE VISIT (OUTPATIENT)
Dept: PHYSICAL THERAPY | Age: 68
End: 2023-10-26
Attending: INTERNAL MEDICINE
Payer: MEDICARE

## 2023-10-26 PROCEDURE — 97110 THERAPEUTIC EXERCISES: CPT

## 2023-10-26 PROCEDURE — 97112 NEUROMUSCULAR REEDUCATION: CPT

## 2023-10-26 NOTE — PROGRESS NOTES
Diagnosis:   Bilateral chronic knee pain (M25.561,M25.562,G89.29           Referring Provider: Safia Milton  Date of Evaluation:    10/2/23    Precautions:  None Next MD visit:   Gel injections 11/3,11/10/11/17 Dr. Solis Lopez  Date of Surgery: n/a   Insurance Primary/Secondary: MEDICARE / Arnold Precise     # Auth Visits: Medical Necessity progress notes every 10 visits            Subjective: Pt states she is feeling a little better, was sore after last visit but resolved in a day or two. Pain: 4/10      Objective: All of the below objective measures are from evaluation for reference:  AROM: (* denotes performed with pain)  Knee   Flexion: R 110; L 110  Extension: R 0; L lacking 1 deg      TUG test no AD 14.0 secs      Gait: pt ambulates on level ground with antalgia and trendelenburg/waddle  Balance: SLS: R 7 sec, L 2 sec    Assessment: Revised order of session, improved force production and range. Added       Goals:    (to be met in 10 visits)  Pt will improve knee extension ROM to 0 deg to allow proper heel strike during gait and terminal knee extension in stance   Pt will improve knee AROM flexion to >110 degrees to improve ability to perform transfers with greater ease. Pt will improve quad strength to 4+/5 to ascend 1 flight of stairs reciprocally without UE assist   Pt will increase hip and knee strength to grossly 4+/5 to be able to get up and down from the floor safely   Pt will demonstrate increased hip ER/ABD strength to 4+/5 to perform stepping and squatting activities without excessive femoral IR/ADD   Pt will improve SLS to 10 s to improve safety with gait on uneven surfaces such as grass and gravel  Pt will demonstrate increased gait efficiency as evidenced by TUG test LRAD to < 11 secs.   Pt will be independent and compliant with comprehensive HEP to maintain progress achieved in PT     Plan: Address ROM deficits, soft tissue length, lateral hip and pelvic stability for improved SLS time, force production and greater gait efficiency. Add leg press, Heel leading tap overs and stair taps for increased hip and knee flexion for gait biomechanics. Date: 10/6/2023  TX#: 2/10 Date:  10/9/23        TX#: 3/10 Date: 10/23/23                TX#: 4/10 Date:  10/26/23             TX#: 5/10 Date:    Tx#: 6/ THERE EX:  Warm up Nu step 8 mins arms and legs level 3 seat 7  Standing gastroc stretch 30 secs 3x BLEs  SAQs 10x each LE  Manual hamstring, glut, piriformis stretch 30 secs 3x each LE  PROM B knees 5 mins   Hook lying green band 10x2 hip ABD  Hip ADD ball squeeze 10x 3 sec hold  Hip ADD ball squeeze bridge 10x   STS with forward weight shift mat table 22 secs 15x THERE EX:  Warm up Nu step 10 mins arms and legs level 3 seat 7  Standing gastroc stretch 30 secs 3x BLEs  SAQs 10x 2 each LE 1#  Manual hamstring, glut, piriformis stretch 30 secs 3x each LE  PROM B knees 5 mins   Hook lying green band 10x2 hip ABD  Hip ADD ball squeeze 10x 3 sec hold  Hip ADD ball squeeze bridge 10x 2  Shuttle leg press 62 lbs 10x3  4 in step ups 10x eac LE BUE support THERE EX:  Warm up Nu step 10 mins arms and legs level 3 seat 7  Standing gastroc stretch 30 secs 3x BLEs  SAQs 10x 2 each LE 1#  Manual hamstring, glut, piriformis stretch 30 secs 3x each LE  PROM B knees 5 mins   Hook lying green band 10x2 hip ABD  Hip ADD ball squeeze 10x 3 sec hold  Bridge green band 10x2   Shuttle leg press 68 lbs 10x3  4 in step ups 10x eac LE BUE support  Lateral walking 20ftx2 each direction THERE EX:  Warm up Nu step 10 mins arms and legs level 3 seat 7  Standing gastroc stretch 30 secs 3x BLEs  4 in step ups 5x eac LE BUE support, progressing to up and over 5x each   Shuttle leg press 68 lbs 10x3  PROM B knees 5 mins   Hook lying green band 10x2 hip ABD  Hip ADD ball squeeze 10x 3 sec hold  Bridge green band 10x2   SAQs 10x 2 each LE 2#  Manual hamstring, glut, piriformis stretch 30 secs 3x each LE  Supine hip flexor stretch      NEURO RE ED:  4 in stair taps 15x eahc LE NEURO RE ED:  4 in stair taps 10x2 eahc LE  4 in lunge and step off single UE support 10x each LE   NEURO RE ED:  4 in stair taps 10x2 eahc LE  4 in lunge and step off single UE support 10x each LE  Heel leading tap overs 3 in height 10x each LE single UE support                   HEP: Denoted with *   Access Code: [email or web address]  URL: [email or web address]  Date: 10/03/2023  Prepared by: Kedric Gaucher     Exercises  - Seated Hip Abduction with Resistance  - 1 x daily - 7 x weekly - 3 sets - 10 reps - 2 hold  - Squat with Chair Touch  - 1 x daily - 7 x weekly - 2 sets - 10 reps  Bridges with ball 10x2 4x/week  Charges: 2 ther ex  1 neuro re ed     Total Timed Treatment: 45 min  Total Treatment Time: 45 min

## 2023-11-02 ENCOUNTER — OFFICE VISIT (OUTPATIENT)
Dept: PHYSICAL THERAPY | Age: 68
End: 2023-11-02
Attending: INTERNAL MEDICINE
Payer: MEDICARE

## 2023-11-02 PROCEDURE — 97112 NEUROMUSCULAR REEDUCATION: CPT

## 2023-11-02 PROCEDURE — 97110 THERAPEUTIC EXERCISES: CPT

## 2023-11-02 NOTE — PROGRESS NOTES
Diagnosis:   Bilateral chronic knee pain (M25.561,M25.562,G89.29           Referring Provider: Isidro Bentley  Date of Evaluation:    10/2/23    Precautions:  None Next MD visit:   Gel injections 11/3,11/10/11/17 Dr. Aman Riggs  Date of Surgery: n/a   Insurance Primary/Secondary: MEDICARE / Nayeli Olivera     # Auth Visits: Medical Necessity progress notes every 10 visits            Subjective: Pt states her knees are ok, used walking sticks when walking outdoors and felt much more secure. Pain: 3/10      Objective: All of the below objective measures are from evaluation for reference:  AROM: (* denotes performed with pain)  Knee   Flexion: R 110; L 110  Extension: R 0; L lacking 1 deg      TUG test no AD 14.0 secs      Gait: pt ambulates on level ground with antalgia and trendelenburg/waddle  Balance: SLS: R 7 sec, L 2 sec    Assessment: Revised order of session, improved force production and range. Added  Rocker board for balance challenge. Med/Lat noted difficulty. Plan to add wobble board next visit. Goals:    (to be met in 10 visits)  Pt will improve knee extension ROM to 0 deg to allow proper heel strike during gait and terminal knee extension in stance   Pt will improve knee AROM flexion to >110 degrees to improve ability to perform transfers with greater ease. Pt will improve quad strength to 4+/5 to ascend 1 flight of stairs reciprocally without UE assist   Pt will increase hip and knee strength to grossly 4+/5 to be able to get up and down from the floor safely   Pt will demonstrate increased hip ER/ABD strength to 4+/5 to perform stepping and squatting activities without excessive femoral IR/ADD   Pt will improve SLS to 10 s to improve safety with gait on uneven surfaces such as grass and gravel  Pt will demonstrate increased gait efficiency as evidenced by TUG test LRAD to < 11 secs.   Pt will be independent and compliant with comprehensive HEP to maintain progress achieved in PT     Plan: Address ROM deficits, soft tissue length, lateral hip and pelvic stability for improved SLS time, force production and greater gait efficiency. Add leg press, Heel leading tap overs and stair taps for increased hip and knee flexion for gait biomechanics.   Date: 10/6/2023  TX#: 2/10 Date:  10/9/23        TX#: 3/10 Date: 10/23/23                TX#: 4/10 Date:  10/26/23             TX#: 5/10 Date: 11/2/23  Tx#: 6/10   THERE EX:  Warm up Nu step 8 mins arms and legs level 3 seat 7  Standing gastroc stretch 30 secs 3x BLEs  SAQs 10x each LE  Manual hamstring, glut, piriformis stretch 30 secs 3x each LE  PROM B knees 5 mins   Hook lying green band 10x2 hip ABD  Hip ADD ball squeeze 10x 3 sec hold  Hip ADD ball squeeze bridge 10x   STS with forward weight shift mat table 22 secs 15x THERE EX:  Warm up Nu step 10 mins arms and legs level 3 seat 7  Standing gastroc stretch 30 secs 3x BLEs  SAQs 10x 2 each LE 1#  Manual hamstring, glut, piriformis stretch 30 secs 3x each LE  PROM B knees 5 mins   Hook lying green band 10x2 hip ABD  Hip ADD ball squeeze 10x 3 sec hold  Hip ADD ball squeeze bridge 10x 2  Shuttle leg press 62 lbs 10x3  4 in step ups 10x eac LE BUE support THERE EX:  Warm up Nu step 10 mins arms and legs level 3 seat 7  Standing gastroc stretch 30 secs 3x BLEs  SAQs 10x 2 each LE 1#  Manual hamstring, glut, piriformis stretch 30 secs 3x each LE  PROM B knees 5 mins   Hook lying green band 10x2 hip ABD  Hip ADD ball squeeze 10x 3 sec hold  Bridge green band 10x2   Shuttle leg press 68 lbs 10x3  4 in step ups 10x eac LE BUE support  Lateral walking 20ftx2 each direction THERE EX:  Warm up Nu step 10 mins arms and legs level 3 seat 7  Standing gastroc stretch 30 secs 3x BLEs  4 in step ups 5x eac LE BUE support, progressing to up and over 5x each   Shuttle leg press 68 lbs 10x3  PROM B knees 5 mins   Hook lying green band 10x2 hip ABD  Hip ADD ball squeeze 10x 3 sec hold  Bridge green band 10x2   SAQs 10x 2 each LE 2#  Manual hamstring, glut, piriformis stretch 30 secs 3x each LE  Supine hip flexor stretch  THERE EX:  Warm up Nu step 10 mins arms and legs level 3 seat 7  Standing gastroc stretch 30 secs 3x BLEs  4 in step ups 5x eac LE BUE support, progressing to up and over 5x each   Shuttle leg press 75 lbs 10x3  PROM B knees 5 mins   Hook lying green band 10x2 hip ABD  Hip ADD ball squeeze 10x 3 sec hold  Bridge green band 10x3  SAQs 10x 2 each LE 2#      NEURO RE ED:  4 in stair taps 15x eahc LE NEURO RE ED:  4 in stair taps 10x2 eahc LE  4 in lunge and step off single UE support 10x each LE   NEURO RE ED:  4 in stair taps 10x2 eahc LE  4 in lunge and step off single UE support 10x each LE  Heel leading tap overs 3 in height 10x each LE single UE support  NEURO RE ED:  4 in stair taps 10x2 eahc LE  4 in lunge and step off single UE support 10x each LE  Heel leading tap overs 3 in height 10x each LE single UE support   Rocker board A-P 30x Med/lat 30x BUE support cues for control                 HEP: Denoted with *   Access Code: [email or web address]  URL: [email or web address]  Date: 10/03/2023  Prepared by: James Butt     Exercises  - Seated Hip Abduction with Resistance  - 1 x daily - 7 x weekly - 3 sets - 10 reps - 2 hold  - Squat with Chair Touch  - 1 x daily - 7 x weekly - 2 sets - 10 reps  Bridges with ball 10x2 4x/week  Charges: 2 ther ex  1 neuro re ed     Total Timed Treatment: 45 min  Total Treatment Time: 45 min

## 2023-11-07 ENCOUNTER — OFFICE VISIT (OUTPATIENT)
Dept: PHYSICAL THERAPY | Age: 68
End: 2023-11-07
Attending: INTERNAL MEDICINE
Payer: MEDICARE

## 2023-11-07 PROCEDURE — 97110 THERAPEUTIC EXERCISES: CPT

## 2023-11-07 NOTE — PROGRESS NOTES
Diagnosis:   Bilateral chronic knee pain (M25.561,M25.562,G89.29           Referring Provider: Jostin Harding  Date of Evaluation:    10/2/23    Precautions:  None Next MD visit:   Gel injections 11/3,11/10/11/17 Dr. Lzi Lyons  Date of Surgery: n/a   Insurance Primary/Secondary: MEDICARE / [address] [place] [address]     # Auth Visits: Medical Necessity progress notes every 10 visits            Subjective: Pt had injections 11/3 and feels some relief, but thinks she has gotten used to it. Leaving the MD office felt about 50% pain relief but by next day, only 30%    Pain: 3/10      Objective: All of the below objective measures are from evaluation for reference:  AROM: (* denotes performed with pain)  Knee   Flexion: R 110; L 110  Extension: R 0; L lacking 1 deg      TUG test no AD 14.0 secs      Gait: pt ambulates on level ground with antalgia and trendelenburg/waddle  Balance: SLS: R 7 sec, L 2 sec    Assessment: Improving force production after injection, but pain remains high with step overs. Improving PROM flexion    Goals:    (to be met in 10 visits)  Pt will improve knee extension ROM to 0 deg to allow proper heel strike during gait and terminal knee extension in stance   Pt will improve knee AROM flexion to >110 degrees to improve ability to perform transfers with greater ease. Pt will improve quad strength to 4+/5 to ascend 1 flight of stairs reciprocally without UE assist   Pt will increase hip and knee strength to grossly 4+/5 to be able to get up and down from the floor safely   Pt will demonstrate increased hip ER/ABD strength to 4+/5 to perform stepping and squatting activities without excessive femoral IR/ADD   Pt will improve SLS to 10 s to improve safety with gait on uneven surfaces such as grass and gravel  Pt will demonstrate increased gait efficiency as evidenced by TUG test LRAD to < 11 secs.   Pt will be independent and compliant with comprehensive HEP to maintain progress achieved in PT     Plan: Address ROM deficits, soft tissue length, lateral hip and pelvic stability for improved SLS time, force production and greater gait efficiency. Add leg press, Heel leading tap overs and stair taps for increased hip and knee flexion for gait biomechanics.   Date: 10/23/23                TX#: 4/10 Date:  10/26/23             TX#: 5/10 Date: 11/2/23  Tx#: 6/10 Date: 11/7/23  TX#: 7/10   THERE EX:  Warm up Nu step 10 mins arms and legs level 3 seat 7  Standing gastroc stretch 30 secs 3x BLEs  SAQs 10x 2 each LE 1#  Manual hamstring, glut, piriformis stretch 30 secs 3x each LE  PROM B knees 5 mins   Hook lying green band 10x2 hip ABD  Hip ADD ball squeeze 10x 3 sec hold  Bridge green band 10x2   Shuttle leg press 68 lbs 10x3  4 in step ups 10x eac LE BUE support  Lateral walking 20ftx2 each direction THERE EX:  Warm up Nu step 10 mins arms and legs level 3 seat 7  Standing gastroc stretch 30 secs 3x BLEs  4 in step ups 5x eac LE BUE support, progressing to up and over 5x each   Shuttle leg press 68 lbs 10x3  PROM B knees 5 mins   Hook lying green band 10x2 hip ABD  Hip ADD ball squeeze 10x 3 sec hold  Bridge green band 10x2   SAQs 10x 2 each LE 2#  Manual hamstring, glut, piriformis stretch 30 secs 3x each LE  Supine hip flexor stretch  THERE EX:  Warm up Nu step 10 mins arms and legs level 3 seat 7  Standing gastroc stretch 30 secs 3x BLEs  4 in step ups 5x eac LE BUE support, progressing to up and over 5x each   Shuttle leg press 75 lbs 10x3  PROM B knees 5 mins   Hook lying green band 10x2 hip ABD  Hip ADD ball squeeze 10x 3 sec hold  Bridge green band 10x3  SAQs 10x 2 each LE 2#   THERE EX:  Warm up Nu step 10 mins arms and legs level 3 seat 7  Standing gastroc stretch 30 secs 3x BLEs  4 in step ups 5x eac LE BUE support, progressing to up and over 5x each    Shuttle leg press 75 lbs 10x wobble board   PROM B knees 5 mins   Hook lying green band 10x2 hip ABD  Hip ADD ball squeeze 10x 3 sec hold  Bridge green band 10x3  SAQs 10x 2 each LE 2#  Red band lateral walking 28ft each direction  4 in step hip hike 12x each LE BUE support   NEURO RE ED:  4 in stair taps 10x2 eahc LE  4 in lunge and step off single UE support 10x each LE   NEURO RE ED:  4 in stair taps 10x2 eahc LE  4 in lunge and step off single UE support 10x each LE  Heel leading tap overs 3 in height 10x each LE single UE support  NEURO RE ED:  4 in stair taps 10x2 eahc LE  4 in lunge and step off single UE support 10x each LE  Heel leading tap overs 3 in height 10x each LE single UE support   Rocker board A-P 30x Med/lat 30x BUE support cues for control NEURO RE ED:  Rocker board A-P 30x Med/lat 30x BUE support cues for control                HEP: Denoted with *   Access Code: [email or web address]  URL: [email or web address]  Date: 10/03/2023  Prepared by: Qatari Govern     Exercises  - Seated Hip Abduction with Resistance  - 1 x daily - 7 x weekly - 3 sets - 10 reps - 2 hold  - Squat with Chair Touch  - 1 x daily - 7 x weekly - 2 sets - 10 reps  Bridges with ball 10x2 4x/week  Charges: 3 ther ex     Total Timed Treatment: 45 min  Total Treatment Time: 45 min

## 2023-11-09 ENCOUNTER — OFFICE VISIT (OUTPATIENT)
Dept: PHYSICAL THERAPY | Age: 68
End: 2023-11-09
Attending: INTERNAL MEDICINE
Payer: MEDICARE

## 2023-11-09 PROCEDURE — 97112 NEUROMUSCULAR REEDUCATION: CPT

## 2023-11-09 PROCEDURE — 97110 THERAPEUTIC EXERCISES: CPT

## 2023-11-09 NOTE — PROGRESS NOTES
Diagnosis:   Bilateral chronic knee pain (M25.561,M25.562,G89.29           Referring Provider: Mely Hong  Date of Evaluation:    10/2/23    Precautions:  None Next MD visit:   Gel injections 11/3,11/10/11/17 Dr. Kitty De La Rosa  Date of Surgery: n/a   Insurance Primary/Secondary: MEDICARE / [address]     # Auth Visits: Medical Necessity progress notes every 10 visits            Subjective: Pt had injections 11/3 and feels some relief, but thinks she has gotten used to it. Leaving the MD office felt about 50% pain relief but by next day, only 30%    Pain: 3/10      Objective: All of the below objective measures are from evaluation for reference:  AROM: (* denotes performed with pain)  Knee   Flexion: R 110; L 110  Extension: R 0; L lacking 1 deg      TUG test no AD 14.0 secs      Gait: pt ambulates on level ground with antalgia and trendelenburg/waddle  Balance: SLS: R 7 sec, L 2 sec    Assessment:Session focused on quad control and gait biomechanic prep. No increased pain reported. Goals:    (to be met in 10 visits)  Pt will improve knee extension ROM to 0 deg to allow proper heel strike during gait and terminal knee extension in stance   Pt will improve knee AROM flexion to >110 degrees to improve ability to perform transfers with greater ease. Pt will improve quad strength to 4+/5 to ascend 1 flight of stairs reciprocally without UE assist   Pt will increase hip and knee strength to grossly 4+/5 to be able to get up and down from the floor safely   Pt will demonstrate increased hip ER/ABD strength to 4+/5 to perform stepping and squatting activities without excessive femoral IR/ADD   Pt will improve SLS to 10 s to improve safety with gait on uneven surfaces such as grass and gravel  Pt will demonstrate increased gait efficiency as evidenced by TUG test LRAD to < 11 secs.   Pt will be independent and compliant with comprehensive HEP to maintain progress achieved in PT     Plan: Address ROM deficits, soft tissue length, lateral hip and pelvic stability for improved SLS time, force production and greater gait efficiency. Add leg press, Heel leading tap overs and stair taps for increased hip and knee flexion for gait biomechanics.   Date: 10/23/23                TX#: 4/10 Date:  10/26/23             TX#: 5/10 Date: 11/2/23  Tx#: 6/10 Date: 11/7/23  TX#: 7/10 Date: 11/9/23  TX#: 8/10   THERE EX:  Warm up Nu step 10 mins arms and legs level 3 seat 7  Standing gastroc stretch 30 secs 3x BLEs  SAQs 10x 2 each LE 1#  Manual hamstring, glut, piriformis stretch 30 secs 3x each LE  PROM B knees 5 mins   Hook lying green band 10x2 hip ABD  Hip ADD ball squeeze 10x 3 sec hold  Bridge green band 10x2   Shuttle leg press 68 lbs 10x3  4 in step ups 10x eac LE BUE support  Lateral walking 20ftx2 each direction THERE EX:  Warm up Nu step 10 mins arms and legs level 3 seat 7  Standing gastroc stretch 30 secs 3x BLEs  4 in step ups 5x eac LE BUE support, progressing to up and over 5x each   Shuttle leg press 68 lbs 10x3  PROM B knees 5 mins   Hook lying green band 10x2 hip ABD  Hip ADD ball squeeze 10x 3 sec hold  Bridge green band 10x2   SAQs 10x 2 each LE 2#  Manual hamstring, glut, piriformis stretch 30 secs 3x each LE  Supine hip flexor stretch  THERE EX:  Warm up Nu step 10 mins arms and legs level 3 seat 7  Standing gastroc stretch 30 secs 3x BLEs  4 in step ups 5x eac LE BUE support, progressing to up and over 5x each   Shuttle leg press 75 lbs 10x3  PROM B knees 5 mins   Hook lying green band 10x2 hip ABD  Hip ADD ball squeeze 10x 3 sec hold  Bridge green band 10x3  SAQs 10x 2 each LE 2#   THERE EX:  Warm up Nu step 10 mins arms and legs level 3 seat 7  Standing gastroc stretch 30 secs 3x BLEs  4 in step ups 5x eac LE BUE support, progressing to up and over 5x each    Shuttle leg press 75 lbs 10x wobble board   PROM B knees 5 mins   Hook lying green band 10x2 hip ABD  Hip ADD ball squeeze 10x 3 sec hold  Bridge green band 10x3  SAQs 10x 2 each LE 2#  Red band lateral walking 28ft each direction  4 in step hip hike 12x each LE BUE support THERE EX:  Warm up Nu step 10 mins arms and legs level 3 seat 7  Standing gastroc stretch 30 secs 3x BLEs  Shuttle leg press 75 lbs 10x wobble board   Hip ADD ball squeeze 10x3   Hook lying green band 15x2 hip ABD  Hip ADD ball squeeze 10x 3 sec hold  Partial squats 10x2   Bridge green band 10x3  SAQs 12x 2 each LE 2#  Red band lateral walking 35ft each direction  4 in step hip hike 12x each LE BUE support   NEURO RE ED:  4 in stair taps 10x2 eahc LE  4 in lunge and step off single UE support 10x each LE   NEURO RE ED:  4 in stair taps 10x2 eahc LE  4 in lunge and step off single UE support 10x each LE  Heel leading tap overs 3 in height 10x each LE single UE support  NEURO RE ED:  4 in stair taps 10x2 eahc LE  4 in lunge and step off single UE support 10x each LE  Heel leading tap overs 3 in height 10x each LE single UE support   Rocker board A-P 30x Med/lat 30x BUE support cues for control NEURO RE ED:  Rocker board A-P 30x Med/lat 30x BUE support cues for control  NEURO RE ED:  Rocker board A-P 30x Med/lat 30x BUE support cues for control   Heel leading tap overs 3 in height 15x each LE single UE support   4 in stair taps 10x2 eahc LE                 HEP: Denoted with *   Access Code: [email or web address]  URL: [email or web address]  Date: 10/03/2023  Prepared by: Steve Siegel     Exercises  - Seated Hip Abduction with Resistance  - 1 x daily - 7 x weekly - 3 sets - 10 reps - 2 hold  - Squat with Chair Touch  - 1 x daily - 7 x weekly - 2 sets - 10 reps  Bridges with ball 10x2 4x/week  Charges: 1 neuro re ed 2 ther ex     Total Timed Treatment: 45 min  Total Treatment Time: 45 min

## 2023-11-10 DIAGNOSIS — I10 ESSENTIAL HYPERTENSION WITH GOAL BLOOD PRESSURE LESS THAN 140/90: ICD-10-CM

## 2023-11-10 DIAGNOSIS — E11.9 TYPE 2 DIABETES MELLITUS WITHOUT COMPLICATION, WITHOUT LONG-TERM CURRENT USE OF INSULIN (HCC): ICD-10-CM

## 2023-11-10 DIAGNOSIS — E78.00 HYPERCHOLESTEREMIA: ICD-10-CM

## 2023-11-11 ENCOUNTER — TELEPHONE (OUTPATIENT)
Dept: FAMILY MEDICINE CLINIC | Facility: CLINIC | Age: 68
End: 2023-11-11

## 2023-11-11 DIAGNOSIS — M17.0 PRIMARY OSTEOARTHRITIS OF BOTH KNEES: ICD-10-CM

## 2023-11-11 DIAGNOSIS — E11.9 TYPE 2 DIABETES MELLITUS WITHOUT COMPLICATION, WITHOUT LONG-TERM CURRENT USE OF INSULIN (HCC): ICD-10-CM

## 2023-11-11 DIAGNOSIS — I10 ESSENTIAL HYPERTENSION WITH GOAL BLOOD PRESSURE LESS THAN 140/90: ICD-10-CM

## 2023-11-11 DIAGNOSIS — E78.00 HYPERCHOLESTEREMIA: ICD-10-CM

## 2023-11-11 RX ORDER — METFORMIN HYDROCHLORIDE 750 MG/1
750 TABLET, EXTENDED RELEASE ORAL
Qty: 180 TABLET | Refills: 0 | Status: SHIPPED | OUTPATIENT
Start: 2023-11-11 | End: 2024-05-09

## 2023-11-11 RX ORDER — ROSUVASTATIN CALCIUM 5 MG/1
5 TABLET, COATED ORAL NIGHTLY
Qty: 90 TABLET | Refills: 1 | Status: SHIPPED | OUTPATIENT
Start: 2023-11-11 | End: 2024-11-05

## 2023-11-11 RX ORDER — PIOGLITAZONEHYDROCHLORIDE 30 MG/1
30 TABLET ORAL DAILY
Qty: 180 TABLET | Refills: 0 | Status: SHIPPED | OUTPATIENT
Start: 2023-11-11 | End: 2024-05-09

## 2023-11-11 RX ORDER — LISINOPRIL 20 MG/1
20 TABLET ORAL DAILY
Qty: 180 TABLET | Refills: 0 | Status: SHIPPED | OUTPATIENT
Start: 2023-11-11 | End: 2024-05-09

## 2023-11-11 RX ORDER — GLIMEPIRIDE 2 MG/1
4 TABLET ORAL
Qty: 360 TABLET | Refills: 0 | Status: SHIPPED | OUTPATIENT
Start: 2023-11-11 | End: 2024-05-09

## 2023-11-11 RX ORDER — MELOXICAM 15 MG/1
15 TABLET ORAL DAILY
Qty: 180 TABLET | Refills: 0 | Status: SHIPPED | OUTPATIENT
Start: 2023-11-11 | End: 2023-11-13

## 2023-11-11 RX ORDER — BISOPROLOL FUMARATE AND HYDROCHLOROTHIAZIDE 10; 6.25 MG/1; MG/1
2 TABLET ORAL DAILY
Qty: 360 TABLET | Refills: 0 | Status: SHIPPED | OUTPATIENT
Start: 2023-11-11 | End: 2024-05-09

## 2023-11-11 NOTE — TELEPHONE ENCOUNTER
She needs 2 #90 supply of the  bisoprolol, metformin, lisinopril,  glimepiride, rosuvastatin, melixicam, pioglitazone        [facility]  [place]   she will be gone for 6 months

## 2023-11-13 ENCOUNTER — OFFICE VISIT (OUTPATIENT)
Dept: PHYSICAL THERAPY | Age: 68
End: 2023-11-13
Attending: INTERNAL MEDICINE
Payer: MEDICARE

## 2023-11-13 ENCOUNTER — TELEPHONE (OUTPATIENT)
Dept: FAMILY MEDICINE CLINIC | Facility: CLINIC | Age: 68
End: 2023-11-13

## 2023-11-13 DIAGNOSIS — M17.0 PRIMARY OSTEOARTHRITIS OF BOTH KNEES: ICD-10-CM

## 2023-11-13 PROCEDURE — 97110 THERAPEUTIC EXERCISES: CPT

## 2023-11-13 PROCEDURE — 97112 NEUROMUSCULAR REEDUCATION: CPT

## 2023-11-13 RX ORDER — MELOXICAM 15 MG/1
15 TABLET ORAL DAILY
Qty: 180 TABLET | Refills: 1 | Status: SHIPPED | OUTPATIENT
Start: 2023-11-13 | End: 2024-11-07

## 2023-11-13 RX ORDER — BISOPROLOL FUMARATE AND HYDROCHLOROTHIAZIDE 10; 6.25 MG/1; MG/1
2 TABLET ORAL DAILY
Qty: 180 TABLET | Refills: 0 | OUTPATIENT
Start: 2023-11-13

## 2023-11-13 NOTE — TELEPHONE ENCOUNTER
LOV  08/30/2023    LAST LAB  08/30/2023    LAST RX  11/11/2023    Next OV    Future Appointments   Date Time Provider Trista Mona   11/13/2023 11:00 AM Zorita Buggy, PT [facility]   11/15/2023  2:00 PM Ruben Gomes MD [facility] [place]   11/17/2023 12:45 PM Zorita Buggy, PT [facility]   11/28/2023  9:15 AM Zorita Buggy, PT [facility]   12/1/2023  8:30 AM Zorita Buggy, PT [facility]   12/4/2023  1:15 PM Zorita Buggy, PT [facility]   12/7/2023  1:15 PM Zorita Buggy, PT [facility]   12/11/2023  1:15 PM Zorita Buggy, PT [facility]   12/15/2023  1:15 PM Zorita Buggy, PT [facility]         PROTOCOL  Patient is going on vacation out of the country and is in need of 6 month supply. OK per Dr. Maranda Valadez.

## 2023-11-13 NOTE — PROGRESS NOTES
Diagnosis:   Bilateral chronic knee pain (M25.561,M25.562,G89.29           Referring Provider: Elsa Desai  Date of Evaluation:    10/2/23    Precautions:  None Next MD visit:   Gel injections 11/3,11/10/11/17 Dr. Raímrez   Date of Surgery: n/a   Insurance Primary/Secondary: MEDICARE / [employer]     # Auth Visits: Medical Necessity progress notes every 10 visits            Subjective: Pt had injections 11/10 and felt a bit better, but then went to a friend's house who had steep stairs to use the bathroom and felt she needed UE support to pull herself up on the stairs. She also notes her back pain has flared up. Pain: 3/10      Objective:   11/13/23- AROM R 0 -108 L 1-109   All of the below objective measures are from evaluation for reference:  AROM: (* denotes performed with pain)  Knee   Flexion: R 110; L 110  Extension: R 0; L lacking 1 deg      TUG test no AD 14.0 secs      Gait: pt ambulates on level ground with antalgia and trendelenburg/waddle  Balance: SLS: R 7 sec, L 2 sec    Assessment ROM grossly intact depsite injections and pain. Continued strengthening and general balance for improved gait efficiency. Goals:    (to be met in 10 visits)  Pt will improve knee extension ROM to 0 deg to allow proper heel strike during gait and terminal knee extension in stance   Pt will improve knee AROM flexion to >110 degrees to improve ability to perform transfers with greater ease. Pt will improve quad strength to 4+/5 to ascend 1 flight of stairs reciprocally without UE assist   Pt will increase hip and knee strength to grossly 4+/5 to be able to get up and down from the floor safely   Pt will demonstrate increased hip ER/ABD strength to 4+/5 to perform stepping and squatting activities without excessive femoral IR/ADD   Pt will improve SLS to 10 s to improve safety with gait on uneven surfaces such as grass and gravel  Pt will demonstrate increased gait efficiency as evidenced by TUG test LRAD to < 11 secs.   Pt will be independent and compliant with comprehensive HEP to maintain progress achieved in PT     Plan: Address ROM deficits, soft tissue length, lateral hip and pelvic stability for improved SLS time, force production and greater gait efficiency. Add leg press, Heel leading tap overs and stair taps for increased hip and knee flexion for gait biomechanics.   Date: 10/23/23                TX#: 4/10 Date:  10/26/23             TX#: 5/10 Date: 11/2/23  Tx#: 6/10 Date: 11/7/23  TX#: 7/10 Date: 11/9/23  TX#: 8/10 Date: 11/13/23  TX#: 9/10     THERE EX:  Warm up Nu step 10 mins arms and legs level 3 seat 7  Standing gastroc stretch 30 secs 3x BLEs  SAQs 10x 2 each LE 1#  Manual hamstring, glut, piriformis stretch 30 secs 3x each LE  PROM B knees 5 mins   Hook lying green band 10x2 hip ABD  Hip ADD ball squeeze 10x 3 sec hold  Bridge green band 10x2   Shuttle leg press 68 lbs 10x3  4 in step ups 10x eac LE BUE support  Lateral walking 20ftx2 each direction THERE EX:  Warm up Nu step 10 mins arms and legs level 3 seat 7  Standing gastroc stretch 30 secs 3x BLEs  4 in step ups 5x eac LE BUE support, progressing to up and over 5x each   Shuttle leg press 68 lbs 10x3  PROM B knees 5 mins   Hook lying green band 10x2 hip ABD  Hip ADD ball squeeze 10x 3 sec hold  Bridge green band 10x2   SAQs 10x 2 each LE 2#  Manual hamstring, glut, piriformis stretch 30 secs 3x each LE  Supine hip flexor stretch  THERE EX:  Warm up Nu step 10 mins arms and legs level 3 seat 7  Standing gastroc stretch 30 secs 3x BLEs  4 in step ups 5x eac LE BUE support, progressing to up and over 5x each   Shuttle leg press 75 lbs 10x3  PROM B knees 5 mins   Hook lying green band 10x2 hip ABD  Hip ADD ball squeeze 10x 3 sec hold  Bridge green band 10x3  SAQs 10x 2 each LE 2#   THERE EX:  Warm up Nu step 10 mins arms and legs level 3 seat 7  Standing gastroc stretch 30 secs 3x BLEs  4 in step ups 5x eac LE BUE support, progressing to up and over 5x each Shuttle leg press 75 lbs 10x wobble board   PROM B knees 5 mins   Hook lying green band 10x2 hip ABD  Hip ADD ball squeeze 10x 3 sec hold  Bridge green band 10x3  SAQs 10x 2 each LE 2#  Red band lateral walking 28ft each direction  4 in step hip hike 12x each LE BUE support THERE EX:  Warm up Nu step 10 mins arms and legs level 3 seat 7  Standing gastroc stretch 30 secs 3x BLEs  Shuttle leg press 75 lbs 10x wobble board   Hip ADD ball squeeze 10x3   Hook lying green band 15x2 hip ABD  Hip ADD ball squeeze 10x 3 sec hold  Partial squats 10x2   Bridge green band 10x3  4 in step hip hike 12x each LE BUE support   THERE EX:  Warm up Nu step 10 mins arms and legs level 3 seat 7  Standing gastroc stretch 30 secs 3x BLEs  Shuttle leg press 75 lbs 10x wobble board   Partial squats 10x2   4 in step up and over 10x eahc LE leading BUE support  Red band lateral walking 42 ft  each direction   4 in step hip hike 15x each LE BUE support   NEURO RE ED:  4 in stair taps 10x2 eahc LE  4 in lunge and step off single UE support 10x each LE   NEURO RE ED:  4 in stair taps 10x2 eahc LE  4 in lunge and step off single UE support 10x each LE  Heel leading tap overs 3 in height 10x each LE single UE support  NEURO RE ED:  4 in stair taps 10x2 eahc LE  4 in lunge and step off single UE support 10x each LE  Heel leading tap overs 3 in height 10x each LE single UE support   Rocker board A-P 30x Med/lat 30x BUE support cues for control NEURO RE ED:  Rocker board A-P 30x Med/lat 30x BUE support cues for control  NEURO RE ED:  Rocker board A-P 30x Med/lat 30x BUE support cues for control   Heel leading tap overs 3 in height 15x each LE single UE support   4 in stair taps 10x2 eahc LE NEURO RE ED:  Rocker board A-P 30x Med/lat 30x BUE support cues for control   Heel leading tap overs 3 in height 15x each LE single UE support   4 in stair taps 10x2 eahc LE                   HEP: Denoted with *   Access Code: [email or web address]  URL: [email or web address]  Date: 10/03/2023  Prepared by: Reddy Eckert     Exercises  - Seated Hip Abduction with Resistance  - 1 x daily - 7 x weekly - 3 sets - 10 reps - 2 hold  - Squat with Chair Touch  - 1 x daily - 7 x weekly - 2 sets - 10 reps  Bridges with ball 10x2 4x/week  Charges: 1 neuro re ed 2 ther ex     Total Timed Treatment: 45 min  Total Treatment Time: 45 min

## 2023-11-13 NOTE — TELEPHONE ENCOUNTER
Requested Prescriptions     Pending Prescriptions Disp Refills    BISOPROLOL-HYDROCHLOROTHIAZIDE 10-6.25 MG Oral Tab [Pharmacy Med Name: BISOPROLOL/HCTZ 10MG/6.25MG TABS] 180 tablet 0     Sig: TAKE 2 TABLETS BY MOUTH DAILY     Duplicate request  Refill denied.

## 2023-11-15 ENCOUNTER — OFFICE VISIT (OUTPATIENT)
Dept: FAMILY MEDICINE CLINIC | Facility: CLINIC | Age: 68
End: 2023-11-15
Payer: MEDICARE

## 2023-11-15 VITALS
RESPIRATION RATE: 12 BRPM | SYSTOLIC BLOOD PRESSURE: 140 MMHG | HEART RATE: 70 BPM | OXYGEN SATURATION: 98 % | BODY MASS INDEX: 49 KG/M2 | TEMPERATURE: 97 F | WEIGHT: 258.5 LBS | DIASTOLIC BLOOD PRESSURE: 80 MMHG

## 2023-11-15 DIAGNOSIS — H60.20 MALIGNANT OTITIS EXTERNA, UNSPECIFIED CHRONICITY, UNSPECIFIED LATERALITY: Primary | ICD-10-CM

## 2023-11-15 PROCEDURE — 99213 OFFICE O/P EST LOW 20 MIN: CPT | Performed by: INTERNAL MEDICINE

## 2023-11-15 PROCEDURE — 1126F AMNT PAIN NOTED NONE PRSNT: CPT | Performed by: INTERNAL MEDICINE

## 2023-11-15 RX ORDER — CIPROFLOXACIN AND DEXAMETHASONE 3; 1 MG/ML; MG/ML
4 SUSPENSION/ DROPS AURICULAR (OTIC) 2 TIMES DAILY
Qty: 7.5 ML | Refills: 0 | Status: SHIPPED | OUTPATIENT
Start: 2023-11-15 | End: 2023-12-04

## 2023-11-17 ENCOUNTER — APPOINTMENT (OUTPATIENT)
Dept: PHYSICAL THERAPY | Age: 68
End: 2023-11-17
Attending: INTERNAL MEDICINE
Payer: MEDICARE

## 2023-11-27 ENCOUNTER — TELEPHONE (OUTPATIENT)
Dept: PHYSICAL THERAPY | Facility: HOSPITAL | Age: 68
End: 2023-11-27

## 2023-11-28 ENCOUNTER — APPOINTMENT (OUTPATIENT)
Dept: PHYSICAL THERAPY | Age: 68
End: 2023-11-28
Attending: INTERNAL MEDICINE
Payer: MEDICARE

## 2023-11-29 ENCOUNTER — OFFICE VISIT (OUTPATIENT)
Dept: FAMILY MEDICINE CLINIC | Facility: CLINIC | Age: 68
End: 2023-11-29
Payer: MEDICARE

## 2023-11-29 VITALS
OXYGEN SATURATION: 95 % | RESPIRATION RATE: 18 BRPM | BODY MASS INDEX: 49 KG/M2 | TEMPERATURE: 98 F | SYSTOLIC BLOOD PRESSURE: 136 MMHG | DIASTOLIC BLOOD PRESSURE: 84 MMHG | WEIGHT: 260.13 LBS | HEART RATE: 53 BPM

## 2023-11-29 DIAGNOSIS — H61.23 BILATERAL IMPACTED CERUMEN: Primary | ICD-10-CM

## 2023-11-30 NOTE — PROGRESS NOTES
Progress Summary  Pt has attended 10 visits in Physical Therapy. Diagnosis:   Bilateral chronic knee pain (M25.561,M25.562,G89.29           Referring Provider: Jacqui Spears  Date of Evaluation:    10/2/23    Precautions:  None Next MD visit:   Gel injections 11/3,11/10/11/17 Dr. Wilfrid Moore  Date of Surgery: n/a   Insurance Primary/Secondary: MEDICARE / Marian Rivas     # Auth Visits: Medical Necessity progress notes every 10 visits            Subjective: Pt states her knees are doing remarkably well. She states she got through her holiday standing without much complaint. Pain: 3/10      Objective:   12/1/23-  SLS R LE 2 LLE 3 TUG test 11.9 sec no AD  AROM L 1-108 deg R 0-106  11/13/23- AROM R 0 -108 L 1-109   All of the below objective measures are from evaluation for reference:  AROM: (* denotes performed with pain)  Knee   Flexion: R 110; L 110  Extension: R 0; L lacking 1 deg      TUG test no AD 14.0 secs      Gait: pt ambulates on level ground with antalgia and trendelenburg/waddle  Balance: SLS: R 7 sec, L 2 sec    Assessment    Pt is making gains in skilled PT progressing toward all goals AROM maintained s/p injections with mod pain relief. SLS remains limited, range not pushed 2/2 weather event and pain Pt continues to demo deficits in glut strength and quad control impacting pelvic stability and gait efficiency. Pt will benefit from continued skilled physical therapy to address the above limitations and below goals to facilitate return to PLOF. Goals:    (to be met in 14 visits)  Pt will improve knee extension ROM to 0 deg to allow proper heel strike during gait and terminal knee extension in stance  12/1/23- ongoing  Pt will improve knee AROM flexion to >110 degrees to improve ability to perform transfers with greater ease.  12/1/23- ongoing  Pt will improve quad strength to 4+/5 to ascend 1 flight of stairs reciprocally without UE assist 12/1/23- progressing  Pt will increase hip and knee strength to grossly 4+/5 to be able to get up and down from the floor safely 12/1/23- progressing  Pt will demonstrate increased hip ER/ABD strength to 4+/5 to perform stepping and squatting activities without excessive femoral IR/ADD 12/1/23- progressing  Pt will improve SLS to 10 s to improve safety with gait on uneven surfaces such as grass and gravel 12/1/23- ongoing  Pt will demonstrate increased gait efficiency as evidenced by TUG test LRAD to < 11 secs. 12/1/23- Progressing  Pt will be independent and compliant with comprehensive HEP to maintain progress achieved in PT 12/1/23- Met and ongoing    Plan: Add leg press, Heel leading tap overs and stair taps for increased hip and knee flexion for gait biomechanics.    Date: 11/2/23  Tx#: 6/10 Date: 11/7/23  TX#: 7/10 Date: 11/9/23  TX#: 8/10 Date: 11/13/23  TX#: 9/10 Date: 12/1/23  TX#: 10/14     THERE EX:  Warm up Nu step 10 mins arms and legs level 3 seat 7  Standing gastroc stretch 30 secs 3x BLEs  4 in step ups 5x eac LE BUE support, progressing to up and over 5x each   Shuttle leg press 75 lbs 10x3  PROM B knees 5 mins   Hook lying green band 10x2 hip ABD  Hip ADD ball squeeze 10x 3 sec hold  Bridge green band 10x3  SAQs 10x 2 each LE 2#   THERE EX:  Warm up Nu step 10 mins arms and legs level 3 seat 7  Standing gastroc stretch 30 secs 3x BLEs  4 in step ups 5x eac LE BUE support, progressing to up and over 5x each    Shuttle leg press 75 lbs 10x wobble board   PROM B knees 5 mins   Hook lying green band 10x2 hip ABD  Hip ADD ball squeeze 10x 3 sec hold  Bridge green band 10x3  SAQs 10x 2 each LE 2#  Red band lateral walking 28ft each direction  4 in step hip hike 12x each LE BUE support THERE EX:  Warm up Nu step 10 mins arms and legs level 3 seat 7  Standing gastroc stretch 30 secs 3x BLEs  Shuttle leg press 75 lbs 10x wobble board   Hip ADD ball squeeze 10x3   Hook lying green band 15x2 hip ABD  Hip ADD ball squeeze 10x 3 sec hold  Partial squats 10x2   Bridge green band 10x3  4 in step hip hike 12x each LE BUE support   THERE EX:  Warm up Nu step 10 mins arms and legs level 3 seat 7  Standing gastroc stretch 30 secs 3x BLEs  Shuttle leg press 75 lbs 10x wobble board   Partial squats 10x2   4 in step up and over 10x eahc LE leading BUE support  Red band lateral walking 42 ft  each direction   4 in step hip hike 15x each LE BUE support THERE EX:  Warm up Nu step 10 mins arms and legs level 3 seat 7  Standing gastroc stretch 30 secs 3x BLEs  4 in step up and over 10x2 eahc LE leading BUE support  Red band lateral walking 42 ft  each direction   Red band glut med 10x3 Bue support   Bridge green band 10x3   NEURO RE ED:  4 in stair taps 10x2 eahc LE  4 in lunge and step off single UE support 10x each LE  Heel leading tap overs 3 in height 10x each LE single UE support   Rocker board A-P 30x Med/lat 30x BUE support cues for control NEURO RE ED:  Rocker board A-P 30x Med/lat 30x BUE support cues for control  NEURO RE ED:  Rocker board A-P 30x Med/lat 30x BUE support cues for control   Heel leading tap overs 3 in height 15x each LE single UE support   4 in stair taps 10x2 eahc LE NEURO RE ED:  Rocker board A-P 30x Med/lat 30x BUE support cues for control   Heel leading tap overs 3 in height 15x each LE single UE support   4 in stair taps 10x2 eahc LE                  HEP: Denoted with *   Access Code: [email or web address]  URL: [email or web address]  Date: 10/03/2023  Prepared by: Lu Peres     Exercises  - Seated Hip Abduction with Resistance  - 1 x daily - 7 x weekly - 3 sets - 10 reps - 2 hold  - Squat with Chair Touch  - 1 x daily - 7 x weekly - 2 sets - 10 reps  Bridges with ball 10x2 4x/week  Charges: 3 ther ex     Total Timed Treatment: 45 min  Total Treatment Time: 45 min    Plan: Continue skilled Physical Therapy 1-2 x/week or a total of 4 visits over a 90 day period. Treatment will include: Manual Therapy; Therapeutic Exercises; Neuromuscular Re-education;  Therapeutic Activity; Electrical Stim; Ultrasound; Pt education; Home exercise program instructions; Patient/Family/Caregiver was advised of these findings, precautions, and treatment options and has agreed to actively participate in planning and for this course of care. Thank you for your referral. If you have any questions, please contact me at Dept: [phone]. Sincerely,  Electronically signed by therapist: Citlaly Christianson PT , DPT  Physician's certification required:  Yes  Please co-sign or sign and return this letter via fax as soon as possible to [phone]. I certify the need for these services furnished under this plan of treatment and while under my care.     X___________________________________________________ Date____________________    Certification From: 44/52/3263  To:2/28/2024

## 2023-12-01 ENCOUNTER — OFFICE VISIT (OUTPATIENT)
Dept: PHYSICAL THERAPY | Age: 68
End: 2023-12-01
Attending: INTERNAL MEDICINE
Payer: MEDICARE

## 2023-12-01 PROCEDURE — 97110 THERAPEUTIC EXERCISES: CPT

## 2023-12-04 ENCOUNTER — OFFICE VISIT (OUTPATIENT)
Dept: PHYSICAL THERAPY | Age: 68
End: 2023-12-04
Attending: INTERNAL MEDICINE
Payer: MEDICARE

## 2023-12-04 PROCEDURE — 97112 NEUROMUSCULAR REEDUCATION: CPT

## 2023-12-04 PROCEDURE — 97110 THERAPEUTIC EXERCISES: CPT

## 2023-12-04 NOTE — PROGRESS NOTES
Diagnosis:   Bilateral chronic knee pain (M25.561,M25.562,G89.29           Referring Provider: Hari Barrera  Date of Evaluation:    10/2/23    Precautions:  None Next MD visit:   Gel injections 11/3,11/10/11/17 Dr. Nan Song  Date of Surgery: n/a   Insurance Primary/Secondary: MEDICARE / Nga Marsh     # Auth Visits: Medical Necessity progress notes every 10 visits            Subjective: Pt states she is feeling a little better today given less humidity. Pain: 2/10      Objective:   12/1/23-  SLS R LE 2 LLE 3 TUG test 11.9 sec no AD  AROM L 1-108 deg R 0-106  11/13/23- AROM R 0 -108 L 1-109   All of the below objective measures are from evaluation for reference:  AROM: (* denotes performed with pain)  Knee   Flexion: R 110; L 110  Extension: R 0; L lacking 1 deg      TUG test no AD 14.0 secs      Gait: pt ambulates on level ground with antalgia and trendelenburg/waddle  Balance: SLS: R 7 sec, L 2 sec    Assessment   Additions of SLS time and balance challenges. Endurance and quad control more limiting than pain. Pt progressing with LE strength. Goals:    (to be met in 14 visits)  Pt will improve knee extension ROM to 0 deg to allow proper heel strike during gait and terminal knee extension in stance  12/1/23- ongoing  Pt will improve knee AROM flexion to >110 degrees to improve ability to perform transfers with greater ease.  12/1/23- ongoing  Pt will improve quad strength to 4+/5 to ascend 1 flight of stairs reciprocally without UE assist 12/1/23- progressing  Pt will increase hip and knee strength to grossly 4+/5 to be able to get up and down from the floor safely 12/1/23- progressing  Pt will demonstrate increased hip ER/ABD strength to 4+/5 to perform stepping and squatting activities without excessive femoral IR/ADD 12/1/23- progressing  Pt will improve SLS to 10 s to improve safety with gait on uneven surfaces such as grass and gravel 12/1/23- ongoing  Pt will demonstrate increased gait efficiency as evidenced by TUG test LRAD to < 11 secs. 12/1/23- Progressing  Pt will be independent and compliant with comprehensive HEP to maintain progress achieved in PT 12/1/23- Met and ongoing    Plan: Add leg press, Heel leading tap overs and stair taps for increased hip and knee flexion for gait biomechanics.    Date: 11/9/23  TX#: 8/10 Date: 11/13/23  TX#: 9/10 Date: 12/1/23  TX#: 10/14 Date: 12/4/23  TX#: 11/14     THERE EX:  Warm up Nu step 10 mins arms and legs level 3 seat 7  Standing gastroc stretch 30 secs 3x BLEs  Shuttle leg press 75 lbs 10x wobble board   Hip ADD ball squeeze 10x3   Hook lying green band 15x2 hip ABD  Hip ADD ball squeeze 10x 3 sec hold  Partial squats 10x2   Bridge green band 10x3  4 in step hip hike 12x each LE BUE support   THERE EX:  Warm up Nu step 10 mins arms and legs level 3 seat 7  Standing gastroc stretch 30 secs 3x BLEs  Shuttle leg press 75 lbs 10x wobble board   Partial squats 10x2   4 in step up and over 10x eahc LE leading BUE support  Red band lateral walking 42 ft  each direction   4 in step hip hike 15x each LE BUE support THERE EX:  Warm up Nu step 10 mins arms and legs level 3 seat 7  Standing gastroc stretch 30 secs 3x BLEs  4 in step up and over 10x2 eahc LE leading BUE support  Red band lateral walking 42 ft  each direction   Red band glut med 10x3 Bue support   Bridge green band 10x3 THERE EX:  Warm up Nu step 10 mins arms and legs level 3 seat 7  Standing gastroc stretch 30 secs 3x BLEs  6 in step up and over 10x eahc LE leading BUE support  Red band lateral walking 42 ft  each direction   Red band glut med 10x3 Bue support   Bridge green band 10x3  Green band hip ABD 10x2  Shuttle leg press 75 lbs 10x wobble board 10x2   Manual hamstring, piriformis and glut stretch 30 secs 3x ea    NEURO RE ED:  Rocker board A-P 30x Med/lat 30x BUE support cues for control   Heel leading tap overs 3 in height 15x each LE single UE support   4 in stair taps 10x2 eahc LE NEURO RE ED:  Rocker board A-P 30x Med/lat 30x BUE support cues for control   Heel leading tap overs 3 in height 15x each LE single UE support   4 in stair taps 10x2 eahc LE  NEURO RE ED:  Rocker board A-P 30x Med/lat 30x BUE support cues for control   Heel leading tap overs 3 in height 15x each LE single UE support   4 in stair taps 10x2 eahc LE  Lunge forward and push back air ex mat 15x eahc LE BUE support   Standing bolster roll no UE support 30 secs 3x ea               HEP: Denoted with *   Access Code: [email or web address]  URL: [email or web address]  Date: 10/03/2023  Prepared by: Yenin General     Exercises  - Seated Hip Abduction with Resistance  - 1 x daily - 7 x weekly - 3 sets - 10 reps - 2 hold  - Squat with Chair Touch  - 1 x daily - 7 x weekly - 2 sets - 10 reps  Bridges with ball 10x2 4x/week  Charges: 1 neuro re ed 2 ther ex     Total Timed Treatment: 45 min  Total Treatment Time: 45 min

## 2023-12-06 ENCOUNTER — PATIENT MESSAGE (OUTPATIENT)
Dept: FAMILY MEDICINE CLINIC | Facility: CLINIC | Age: 68
End: 2023-12-06

## 2023-12-06 RX ORDER — PIOGLITAZONEHYDROCHLORIDE 30 MG/1
30 TABLET ORAL DAILY
Qty: 180 TABLET | Refills: 1 | Status: SHIPPED | OUTPATIENT
Start: 2023-12-06 | End: 2024-11-30

## 2023-12-06 NOTE — TELEPHONE ENCOUNTER
From: Karma Ruiz  To: Ashish Speed  Sent: 12/6/2023 8:18 AM CST  Subject: medication for trip    My insurance will not give me the full amount of all my medications. I need to use a Good RX card at [facility] to get 60 tabs of Pioglitazone 30mg. Could you send a prescription request to [facility] in [place] so I can get the med filled there. Thank you.

## 2023-12-07 ENCOUNTER — OFFICE VISIT (OUTPATIENT)
Dept: PHYSICAL THERAPY | Age: 68
End: 2023-12-07
Attending: INTERNAL MEDICINE
Payer: MEDICARE

## 2023-12-07 PROCEDURE — 97110 THERAPEUTIC EXERCISES: CPT

## 2023-12-07 PROCEDURE — 97112 NEUROMUSCULAR REEDUCATION: CPT

## 2023-12-07 NOTE — PROGRESS NOTES
Diagnosis:   Bilateral chronic knee pain (M25.561,M25.562,G89.29           Referring Provider: Jez Guard  Date of Evaluation:    10/2/23    Precautions:  None Next MD visit:   Gel injections 11/3,11/10/11/17 Dr. Babatunde Mata  Date of Surgery: n/a   Insurance Primary/Secondary: MEDICARE / Arthea Blanca     # Auth Visits: Medical Necessity progress notes every 10 visits            Subjective: Pt states she is feeling a little better today given less humidity. Pain: 2/10      Objective:   12/1/23-  SLS R LE 2 LLE 3 TUG test 11.9 sec no AD  AROM L 1-108 deg R 0-106  11/13/23- AROM R 0 -108 L 1-109   All of the below objective measures are from evaluation for reference:  AROM: (* denotes performed with pain)  Knee   Flexion: R 110; L 110  Extension: R 0; L lacking 1 deg      TUG test no AD 14.0 secs      Gait: pt ambulates on level ground with antalgia and trendelenburg/waddle  Balance: SLS: R 7 sec, L 2 sec    Assessment   Added star taps to challenge pelvic stability and SLS time. Pt attempts without UE support, unable to maintain balance. Continued with Single UE support. Goals:    (to be met in 14 visits)  Pt will improve knee extension ROM to 0 deg to allow proper heel strike during gait and terminal knee extension in stance  12/1/23- ongoing  Pt will improve knee AROM flexion to >110 degrees to improve ability to perform transfers with greater ease.  12/1/23- ongoing  Pt will improve quad strength to 4+/5 to ascend 1 flight of stairs reciprocally without UE assist 12/1/23- progressing  Pt will increase hip and knee strength to grossly 4+/5 to be able to get up and down from the floor safely 12/1/23- progressing  Pt will demonstrate increased hip ER/ABD strength to 4+/5 to perform stepping and squatting activities without excessive femoral IR/ADD 12/1/23- progressing  Pt will improve SLS to 10 s to improve safety with gait on uneven surfaces such as grass and gravel 12/1/23- ongoing  Pt will demonstrate increased gait efficiency as evidenced by TUG test LRAD to < 11 secs. 12/1/23- Progressing  Pt will be independent and compliant with comprehensive HEP to maintain progress achieved in PT 12/1/23- Met and ongoing    Plan: Add leg press, Heel leading tap overs and stair taps for increased hip and knee flexion for gait biomechanics.    Date: 11/9/23  TX#: 8/10 Date: 11/13/23  TX#: 9/10 Date: 12/1/23  TX#: 10/14 Date: 12/4/23  TX#: 11/14 Date: 12/7/23  TX#: 12/14     THERE EX:  Warm up Nu step 10 mins arms and legs level 3 seat 7  Standing gastroc stretch 30 secs 3x BLEs  Shuttle leg press 75 lbs 10x wobble board   Hip ADD ball squeeze 10x3   Hook lying green band 15x2 hip ABD  Hip ADD ball squeeze 10x 3 sec hold  Partial squats 10x2   Bridge green band 10x3  4 in step hip hike 12x each LE BUE support   THERE EX:  Warm up Nu step 10 mins arms and legs level 3 seat 7  Standing gastroc stretch 30 secs 3x BLEs  Shuttle leg press 75 lbs 10x wobble board   Partial squats 10x2   4 in step up and over 10x eahc LE leading BUE support  Red band lateral walking 42 ft  each direction   4 in step hip hike 15x each LE BUE support THERE EX:  Warm up Nu step 10 mins arms and legs level 3 seat 7  Standing gastroc stretch 30 secs 3x BLEs  4 in step up and over 10x2 eahc LE leading BUE support  Red band lateral walking 42 ft  each direction   Red band glut med 10x3 Bue support   Bridge green band 10x3 THERE EX:  Warm up Nu step 10 mins arms and legs level 3 seat 7  Standing gastroc stretch 30 secs 3x BLEs  6 in step up and over 10x eahc LE leading BUE support  Red band lateral walking 42 ft  each direction   Red band glut med 10x3 Bue support   Bridge green band 10x3  Green band hip ABD 10x2  Shuttle leg press 75 lbs 10x wobble board 10x2   Manual hamstring, piriformis and glut stretch 30 secs 3x ea  THERE EX:  Warm up Nu step 10 mins arms and legs level 3 seat 7  Standing gastroc stretch 30 secs 3x BLEs  6 in step up and over 10x eahc LE leading BUE support  Red band lateral walking 48 ft  each direction    Bridge green band 10x3  Green band hip ABD 10x3  Shuttle leg press 75 lbs 10x wobble board 10x2   Manual hamstring, piriformis and glut stretch 30 secs 3x ea    NEURO RE ED:  Rocker board A-P 30x Med/lat 30x BUE support cues for control   Heel leading tap overs 3 in height 15x each LE single UE support   4 in stair taps 10x2 eahc LE NEURO RE ED:  Rocker board A-P 30x Med/lat 30x BUE support cues for control   Heel leading tap overs 3 in height 15x each LE single UE support   4 in stair taps 10x2 eahc LE  NEURO RE ED:  Rocker board A-P 30x Med/lat 30x BUE support cues for control   Heel leading tap overs 3 in height 15x each LE single UE support   4 in stair taps 10x2 eahc LE  Lunge forward and push back air ex mat 15x eahc LE BUE support   Standing bolster roll no UE support 30 secs 3x ea NEURO RE ED:  Rocker board A-P 30x Med/lat 30x BUE support cues for control   Heel leading tap overs 3 in height 20x each LE single UE support   4 in stair taps 10x2 eahc LE  Lunge forward and push back air ex mat 15x eahc LE BUE support   Standing bolster roll no UE support 30 secs 3x ea  Standing star taps 5x each LE single UE support on bench                 HEP: Denoted with *   Access Code: [email or web address]  URL: [email or web address]  Date: 10/03/2023  Prepared by: Luis Fernando Sinclair     Exercises  - Seated Hip Abduction with Resistance  - 1 x daily - 7 x weekly - 3 sets - 10 reps - 2 hold  - Squat with Chair Touch  - 1 x daily - 7 x weekly - 2 sets - 10 reps  Bridges with ball 10x2 4x/week  Charges: 1 neuro re ed 2 ther ex     Total Timed Treatment: 45 min  Total Treatment Time: 45 min

## 2023-12-11 ENCOUNTER — OFFICE VISIT (OUTPATIENT)
Dept: PHYSICAL THERAPY | Age: 68
End: 2023-12-11
Attending: INTERNAL MEDICINE
Payer: MEDICARE

## 2023-12-11 PROCEDURE — 97112 NEUROMUSCULAR REEDUCATION: CPT

## 2023-12-11 PROCEDURE — 97110 THERAPEUTIC EXERCISES: CPT

## 2023-12-11 NOTE — PROGRESS NOTES
Diagnosis:   Bilateral chronic knee pain (M25.561,M25.562,G89.29           Referring Provider: Saranya Keita  Date of Evaluation:    10/2/23    Precautions:  None Next MD visit:   Gel injections 11/3,11/10/11/17 Dr. Mitchell Taylor  Date of Surgery: n/a   Insurance Primary/Secondary: MEDICARE / Lorin Watson     # Auth Visits: Medical Necessity progress notes every 10 visits            Subjective: Pt states she is feeling pretty good, getting ready for trip. Pain: 2/10      Objective:   12/11/23-  TUG test 11.2 sec no AD  AROM L -1-111  deg R -5- 110 deg  12/1/23-  SLS R LE 2 LLE 3 TUG test 11.9 sec no AD  AROM L 1-108 deg R 0-106  11/13/23- AROM R 0 -108 L 1-109   All of the below objective measures are from evaluation for reference:  AROM: (* denotes performed with pain)  Knee   Flexion: R 110; L 110  Extension: R 0; L lacking 1 deg      TUG test no AD 14.0 secs      Gait: pt ambulates on level ground with antalgia and trendelenburg/waddle  Balance: SLS: R 7 sec, L 2 sec    Assessment   Progressing toward d/c, ROM improving, TUG stable. Goals:    (to be met in 14 visits)  Pt will improve knee extension ROM to 0 deg to allow proper heel strike during gait and terminal knee extension in stance  12/11/23- GOAL MET  Pt will improve knee AROM flexion to >110 degrees to improve ability to perform transfers with greater ease.  12/1/23- ongoing  Pt will improve quad strength to 4+/5 to ascend 1 flight of stairs reciprocally without UE assist 12/1/23- progressing  Pt will increase hip and knee strength to grossly 4+/5 to be able to get up and down from the floor safely 12/1/23- progressing  Pt will demonstrate increased hip ER/ABD strength to 4+/5 to perform stepping and squatting activities without excessive femoral IR/ADD 12/1/23- progressing  Pt will improve SLS to 10 s to improve safety with gait on uneven surfaces such as grass and gravel 12/1/23- ongoing  Pt will demonstrate increased gait efficiency as evidenced by TUG test LRAD to < 11 secs. 12/1/23- Progressing  Pt will be independent and compliant with comprehensive HEP to maintain progress achieved in PT 12/1/23- Met and ongoing    Plan: Add leg press, Heel leading tap overs and stair taps for increased hip and knee flexion for gait biomechanics.    Date: 11/9/23  TX#: 8/10 Date: 11/13/23  TX#: 9/10 Date: 12/1/23  TX#: 10/14 Date: 12/4/23  TX#: 11/14 Date: 12/7/23  TX#: 12/14 Date: 12/11/23  TX#: 13/14     THERE EX:  Warm up Nu step 10 mins arms and legs level 3 seat 7  Standing gastroc stretch 30 secs 3x BLEs  Shuttle leg press 75 lbs 10x wobble board   Hip ADD ball squeeze 10x3   Hook lying green band 15x2 hip ABD  Hip ADD ball squeeze 10x 3 sec hold  Partial squats 10x2   Bridge green band 10x3  4 in step hip hike 12x each LE BUE support   THERE EX:  Warm up Nu step 10 mins arms and legs level 3 seat 7  Standing gastroc stretch 30 secs 3x BLEs  Shuttle leg press 75 lbs 10x wobble board   Partial squats 10x2   4 in step up and over 10x eahc LE leading BUE support  Red band lateral walking 42 ft  each direction   4 in step hip hike 15x each LE BUE support THERE EX:  Warm up Nu step 10 mins arms and legs level 3 seat 7  Standing gastroc stretch 30 secs 3x BLEs  4 in step up and over 10x2 eahc LE leading BUE support  Red band lateral walking 42 ft  each direction   Red band glut med 10x3 Bue support   Bridge green band 10x3 THERE EX:  Warm up Nu step 10 mins arms and legs level 3 seat 7  Standing gastroc stretch 30 secs 3x BLEs  6 in step up and over 10x eahc LE leading BUE support  Red band lateral walking 42 ft  each direction   Red band glut med 10x3 Bue support   Bridge green band 10x3  Green band hip ABD 10x2  Shuttle leg press 75 lbs 10x wobble board 10x2   Manual hamstring, piriformis and glut stretch 30 secs 3x ea  THERE EX:  Warm up Nu step 10 mins arms and legs level 3 seat 7  Standing gastroc stretch 30 secs 3x BLEs  6 in step up and over 10x eahc LE leading BUE support  Red band lateral walking 48 ft  each direction    Bridge green band 10x3  Green band hip ABD 10x3  Shuttle leg press 75 lbs 10x wobble board 10x2   Manual hamstring, piriformis and glut stretch 30 secs 3x ea  THERE EX:  Warm up Nu step 10 mins arms and legs level 3 seat 7  Standing gastroc stretch 30 secs 3x BLEs  10x eahc LE leading BUE support  Red band lateral walking 48 ft  each direction    Green band hip ABD with glut med bias 10x2 each BUE support   Green band lateral walking 28 ft each way  Shuttle leg press 75 lbs 10x wobble board 10x2   Manual hamstring, piriformis and glut stretch 30 secs 3x ea   NEURO RE ED:  Rocker board A-P 30x Med/lat 30x BUE support cues for control   Heel leading tap overs 3 in height 15x each LE single UE support   4 in stair taps 10x2 eahc LE NEURO RE ED:  Rocker board A-P 30x Med/lat 30x BUE support cues for control   Heel leading tap overs 3 in height 15x each LE single UE support   4 in stair taps 10x2 eahc LE  NEURO RE ED:  Rocker board A-P 30x Med/lat 30x BUE support cues for control   Heel leading tap overs 3 in height 15x each LE single UE support   4 in stair taps 10x2 eahc LE  Lunge forward and push back air ex mat 15x eahc LE BUE support   Standing bolster roll no UE support 30 secs 3x ea NEURO RE ED:  Rocker board A-P 30x Med/lat 30x BUE support cues for control   Heel leading tap overs 3 in height 20x each LE single UE support   4 in stair taps 10x2 eahc LE  Lunge forward and push back air ex mat 15x eahc LE BUE support   Standing bolster roll no UE support 30 secs 3x ea  Standing star taps 5x each LE single UE support on bench NEURO RE ED:  Rocker board A-P 60x Med/lat 60x BUE support cues for control   Heel leading tap overs 3 in height 20x each LE single UE support   Lunge forward and push back air ex mat 15x eahc LE BUE support                    HEP: Denoted with *   Access Code: [email or web address]  URL: [email or web address]  Date: 10/03/2023  Prepared by: [employer]     Exercises  - Seated Hip Abduction with Resistance  - 1 x daily - 7 x weekly - 3 sets - 10 reps - 2 hold  - Squat with Chair Touch  - 1 x daily - 7 x weekly - 2 sets - 10 reps  Bridges with ball 10x2 4x/week  Charges: 1 neuro re ed 2 ther ex     Total Timed Treatment: 45 min  Total Treatment Time: 45 min

## 2023-12-12 ENCOUNTER — TELEPHONE (OUTPATIENT)
Dept: FAMILY MEDICINE CLINIC | Facility: CLINIC | Age: 68
End: 2023-12-12

## 2023-12-15 ENCOUNTER — OFFICE VISIT (OUTPATIENT)
Dept: PHYSICAL THERAPY | Age: 68
End: 2023-12-15
Attending: INTERNAL MEDICINE
Payer: MEDICARE

## 2023-12-15 PROCEDURE — 97112 NEUROMUSCULAR REEDUCATION: CPT

## 2023-12-15 PROCEDURE — 97110 THERAPEUTIC EXERCISES: CPT

## 2023-12-15 NOTE — PROGRESS NOTES
Discharge Summary  Pt has attended 14 visits in Physical Therapy. Diagnosis:   Bilateral chronic knee pain (M25.561,M25.562,G89.29           Referring Provider: Mary Ann Bernbae  Date of Evaluation:    10/2/23    Precautions:  None Next MD visit:   Gel injections 11/3,11/10/11/17 Dr. Jay Traylor  Date of Surgery: n/a   Insurance Primary/Secondary: MEDICARE / [facility]     # Auth Visits: Medical Necessity progress notes every 10 visits            Subjective: Pt states she is feeling more stiff, but ready for d/c     Pain: 2/10      Objective:   12/15/23- SLS 2 sec  LLE 3 sec   12/11/23-  TUG test 11.2 sec no AD  AROM L -1-111  deg R -5- 110 deg  12/1/23-  SLS R LE 2 LLE 3 TUG test 11.9 sec no AD  AROM L 1-108 deg R 0-106  11/13/23- AROM R 0 -108 L 1-109   All of the below objective measures are from evaluation for reference:  AROM: (* denotes performed with pain)  Knee   Flexion: R 110; L 110  Extension: R 0; L lacking 1 deg      TUG test no AD 14.0 secs      Gait: pt ambulates on level ground with antalgia and trendelenburg/waddle  Balance: SLS: R 7 sec, L 2 sec    Assessment    Pt to be d/c from skilled PT 2/2 meeting 5/8 goals and travel. Pt has made gains in ROM and strength as evidenced by increased knee extension  to greater than 0 BLEs and flexion increased by about 1 deg and achieving tissue approximation. Pt has improved functional strength and gait efficiency as evidenced by reduced TUG test time from 14 secs at IE to 11.2 at d/c. Pt reports greater endurance, able to grocery shop and walk through a parking lot without feeling fatigued or short of breath. LEFS score improvement of 21% by discharge noted. Pt has HEP to maintain/progress strength, ROM independently.           Goals:    (to be met in 14 visits)  Pt will improve knee extension ROM to 0 deg to allow proper heel strike during gait and terminal knee extension in stance  12/11/23- GOAL MET  Pt will improve knee AROM flexion to >110 degrees to improve ability to perform transfers with greater ease. 12/15/23- GOAL MET  Pt will improve quad strength to 4+/5 to ascend 1 flight of stairs reciprocally without UE assist 12/15/23- NOT met  Pt will increase hip and knee strength to grossly 4+/5 to be able to get up and down from the floor safely 12/15/23- GOAL MET  Pt will demonstrate increased hip ER/ABD strength to 4+/5 to perform stepping and squatting activities without excessive femoral IR/ADD 12/15/23-GOAL MET  Pt will improve SLS to 10 s to improve safety with gait on uneven surfaces such as grass and gravel 12/15/23-NOT met  Pt will demonstrate increased gait efficiency as evidenced by TUG test LRAD to < 11 secs. 12/15/23- NOT met 11.2 sec  Pt will be independent and compliant with comprehensive HEP to maintain progress achieved in PT 12/1/23- Met     Plan: Add leg press, Heel leading tap overs and stair taps for increased hip and knee flexion for gait biomechanics.    Date: 11/13/23  TX#: 9/10 Date: 12/1/23  TX#: 10/14 Date: 12/4/23  TX#: 11/14 Date: 12/7/23  TX#: 12/14 Date: 12/11/23  TX#: 13/14 Date: 12/15/23  TX:14/14     THERE EX:  Warm up Nu step 10 mins arms and legs level 3 seat 7  Standing gastroc stretch 30 secs 3x BLEs  Shuttle leg press 75 lbs 10x wobble board   Partial squats 10x2   4 in step up and over 10x eahc LE leading BUE support  Red band lateral walking 42 ft  each direction   4 in step hip hike 15x each LE BUE support THERE EX:  Warm up Nu step 10 mins arms and legs level 3 seat 7  Standing gastroc stretch 30 secs 3x BLEs  4 in step up and over 10x2 eahc LE leading BUE support  Red band lateral walking 42 ft  each direction   Red band glut med 10x3 Bue support   Bridge green band 10x3 THERE EX:  Warm up Nu step 10 mins arms and legs level 3 seat 7  Standing gastroc stretch 30 secs 3x BLEs  6 in step up and over 10x eahc LE leading BUE support  Red band lateral walking 42 ft  each direction   Red band glut med 10x3 Bue support   Bridge green band 10x3  Green band hip ABD 10x2  Shuttle leg press 75 lbs 10x wobble board 10x2   Manual hamstring, piriformis and glut stretch 30 secs 3x ea  THERE EX:  Warm up Nu step 10 mins arms and legs level 3 seat 7  Standing gastroc stretch 30 secs 3x BLEs  6 in step up and over 10x eahc LE leading BUE support  Red band lateral walking 48 ft  each direction    Bridge green band 10x3  Green band hip ABD 10x3  Shuttle leg press 75 lbs 10x wobble board 10x2   Manual hamstring, piriformis and glut stretch 30 secs 3x ea  THERE EX:  Warm up Nu step 10 mins arms and legs level 3 seat 7  Standing gastroc stretch 30 secs 3x BLEs  10x eahc LE leading BUE support  Red band lateral walking 48 ft  each direction    Green band hip ABD with glut med bias 10x2 each BUE support   Green band lateral walking 28 ft each way  Shuttle leg press 75 lbs 10x wobble board 10x2   Manual hamstring, piriformis and glut stretch 30 secs 3x ea THERE EX:  Warm up Nu step 10 mins arms and legs level 3 seat 7  Standing gastroc stretch 30 secs 3x BLEs  15x eahc LE leading BUE support 6 in step up and over  Red band lateral walking 48 ft  each direction    Green band hip ABD with glut med bias 10x2 each BUE support   Green band lateral walking 28 ft each way  Shuttle leg press 75 lbs 10x wobble board 10x2   Manual hamstring, piriformis and glut stretch 30 secs 3x ea   NEURO RE ED:  Rocker board A-P 30x Med/lat 30x BUE support cues for control   Heel leading tap overs 3 in height 15x each LE single UE support   4 in stair taps 10x2 eahc LE  NEURO RE ED:  Rocker board A-P 30x Med/lat 30x BUE support cues for control   Heel leading tap overs 3 in height 15x each LE single UE support   4 in stair taps 10x2 eahc LE  Lunge forward and push back air ex mat 15x eahc LE BUE support   Standing bolster roll no UE support 30 secs 3x ea NEURO RE ED:  Rocker board A-P 30x Med/lat 30x BUE support cues for control   Heel leading tap overs 3 in height 20x each LE single UE support   4 in stair taps 10x2 eahc LE  Lunge forward and push back air ex mat 15x eahc LE BUE support   Standing bolster roll no UE support 30 secs 3x ea  Standing star taps 5x each LE single UE support on bench NEURO RE ED:  Rocker board A-P 60x Med/lat 60x BUE support cues for control   Heel leading tap overs 3 in height 20x each LE single UE support   Lunge forward and push back air ex mat 15x eahc LE BUE support  NEURO RE ED:  Rocker board A-P 60x Med/lat 60x BUE support cues for control   Heel leading tap overs 3 in height 20x each LE single UE support   Lunge forward and push back air ex mat 15x eahc LE BUE support                    HEP: Denoted with *   Access Code: [email or web address]  URL: [email or web address]  Date: 10/03/2023  Prepared by: Marcello Mitchell     Exercises  - Seated Hip Abduction with Resistance  - 1 x daily - 7 x weekly - 3 sets - 10 reps - 2 hold  - Squat with Chair Touch  - 1 x daily - 7 x weekly - 2 sets - 10 reps  Bridges with ball 10x2 4x/week  Charges: 1 neuro re ed 2 ther ex     Total Timed Treatment: 45 min  Total Treatment Time: 45 min  LEFS Score  LEFS Score: 51.25 % (9/26/2023  9:56 AM)    Post LEFS Score  Post LEFS Score: 72.5 % (12/15/2023  1:16 PM)    21.25 % improvement    Plan: Discontinue skilled Physical Therapy     Patient/Family/Caregiver was advised of these findings, precautions, and treatment options and has agreed to actively participate in planning and for this course of care. Thank you for your referral. If you have any questions, please contact me at Dept: [phone]. Sincerely,  Electronically signed by therapist: Juan Martinez PT, DPT    Physician's certification required:  No  Please co-sign or sign and return this letter via fax as soon as possible to [phone]. I certify the need for these services furnished under this plan of treatment and while under my care.     X___________________________________________________ Date____________________    Certification From: 49/21/4163  To:3/14/2024

## 2024-05-13 DIAGNOSIS — I10 ESSENTIAL HYPERTENSION WITH GOAL BLOOD PRESSURE LESS THAN 140/90: ICD-10-CM

## 2024-05-13 DIAGNOSIS — E78.00 HYPERCHOLESTEREMIA: ICD-10-CM

## 2024-05-13 DIAGNOSIS — E11.9 TYPE 2 DIABETES MELLITUS WITHOUT COMPLICATION, WITHOUT LONG-TERM CURRENT USE OF INSULIN (HCC): ICD-10-CM

## 2024-05-13 DIAGNOSIS — M17.0 PRIMARY OSTEOARTHRITIS OF BOTH KNEES: ICD-10-CM

## 2024-05-13 RX ORDER — ROSUVASTATIN CALCIUM 5 MG/1
5 TABLET, COATED ORAL NIGHTLY
Qty: 90 TABLET | Refills: 0 | Status: SHIPPED | OUTPATIENT
Start: 2024-05-13 | End: 2024-08-11

## 2024-05-13 RX ORDER — LISINOPRIL 20 MG/1
20 TABLET ORAL DAILY
Qty: 90 TABLET | Refills: 0 | Status: SHIPPED | OUTPATIENT
Start: 2024-05-13 | End: 2024-08-11

## 2024-05-13 RX ORDER — GLIMEPIRIDE 2 MG/1
4 TABLET ORAL
Qty: 180 TABLET | Refills: 0 | Status: SHIPPED | OUTPATIENT
Start: 2024-05-13 | End: 2024-08-11

## 2024-05-13 RX ORDER — MELOXICAM 15 MG/1
15 TABLET ORAL DAILY
Qty: 90 TABLET | Refills: 0 | Status: SHIPPED | OUTPATIENT
Start: 2024-05-13 | End: 2024-08-11

## 2024-05-13 RX ORDER — BISOPROLOL FUMARATE AND HYDROCHLOROTHIAZIDE 10; 6.25 MG/1; MG/1
2 TABLET ORAL DAILY
Qty: 180 TABLET | Refills: 0 | Status: SHIPPED | OUTPATIENT
Start: 2024-05-13 | End: 2024-08-11

## 2024-05-13 RX ORDER — PIOGLITAZONEHYDROCHLORIDE 30 MG/1
30 TABLET ORAL DAILY
Qty: 90 TABLET | Refills: 0 | Status: SHIPPED | OUTPATIENT
Start: 2024-05-13 | End: 2024-08-11

## 2024-05-13 NOTE — TELEPHONE ENCOUNTER
metFORMIN  MG Oral Tablet 24 Hr - 3 MO  pioglitazone (ACTOS) 30 MG Oral Tab - 3 MO  bisoprolol-hydrochlorothiazide 10-6.25 MG Oral Tab  3 MO  glimepiride 2 MG Oral Tab 3 MO   Meloxicam 15 MG Oral Tab - 3 MO  lisinopril 20 MG Oral Tab  3 MO  rosuvastatin 5 MG Oral Tab -3 MO  [facility] [place] ALL 3 MO SUPPLY

## 2024-05-13 NOTE — TELEPHONE ENCOUNTER
metFORMIN  MG Oral Tablet 24 Hr - 3 MO  pioglitazone (ACTOS) 30 MG Oral Tab - 3 MO  bisoprolol-hydrochlorothiazide 10-6.25 MG Oral Tab  3 MO  glimepiride 2 MG Oral Tab 3 MO   Meloxicam 15 MG Oral Tab - 3 MO  lisinopril 20 MG Oral Tab  3 MO  rosuvastatin 5 MG Oral Tab -3 MO  ALL NEED [facility] [place], ALL 3 MO SUPPLY

## 2024-06-04 ENCOUNTER — TELEPHONE (OUTPATIENT)
Dept: FAMILY MEDICINE CLINIC | Facility: CLINIC | Age: 69
End: 2024-06-04

## 2024-07-10 RX ORDER — METFORMIN HYDROCHLORIDE 750 MG/1
750 TABLET, EXTENDED RELEASE ORAL
Qty: 180 TABLET | Refills: 0 | Status: SHIPPED | OUTPATIENT
Start: 2024-07-10

## 2024-07-10 NOTE — TELEPHONE ENCOUNTER
METFORMIN  MG Oral Tablet 24 Hr     Diabetes Medication Protocol Xpohhu38/10/2024 08:45 AM   Protocol Details Last A1C < 7.5 and within past 6 months    In person appointment or virtual visit in the past 6 mos or appointment in next 3 mos    Microalbumin procedure in past 12 months or taking ACE/ARB    EGFRCR or GFRNAA > 50    GFR in the past 12 months      Last office visit:  8/30/23  No future appointments.  Last filled:  11/11/23  #180   Last labs:  8/29/23  A1C: 6.1 eGFR:69

## 2024-07-12 ENCOUNTER — TELEPHONE (OUTPATIENT)
Dept: FAMILY MEDICINE CLINIC | Facility: CLINIC | Age: 69
End: 2024-07-12

## 2024-07-12 NOTE — TELEPHONE ENCOUNTER
Blood work orders in place for CBC, HgbA1c, Chem Prof, Lipid Panel  Any other blood work patient due for at this time?

## 2024-07-12 NOTE — TELEPHONE ENCOUNTER
Patient notified and appointments verified:      Future Appointments   Date Time Provider Department Center   8/27/2024  8:30 AM REF [facility]F [facility] [place]   8/28/2024  9:00 AM Grazyna Nice MD [facility] [place]

## 2024-07-12 NOTE — TELEPHONE ENCOUNTER
Patient would like to come in for labs before her medicare visit on 8/28/24, patient would like to make sure orders are placed.

## 2024-08-14 DIAGNOSIS — E78.00 HYPERCHOLESTEREMIA: ICD-10-CM

## 2024-08-14 DIAGNOSIS — I10 ESSENTIAL HYPERTENSION WITH GOAL BLOOD PRESSURE LESS THAN 140/90: ICD-10-CM

## 2024-08-14 DIAGNOSIS — M17.0 PRIMARY OSTEOARTHRITIS OF BOTH KNEES: ICD-10-CM

## 2024-08-14 DIAGNOSIS — E11.9 TYPE 2 DIABETES MELLITUS WITHOUT COMPLICATION, WITHOUT LONG-TERM CURRENT USE OF INSULIN (HCC): ICD-10-CM

## 2024-08-14 RX ORDER — BISOPROLOL FUMARATE AND HYDROCHLOROTHIAZIDE 10; 6.25 MG/1; MG/1
2 TABLET ORAL DAILY
Qty: 180 TABLET | Refills: 0 | Status: SHIPPED | OUTPATIENT
Start: 2024-08-14 | End: 2024-11-12

## 2024-08-14 RX ORDER — ROSUVASTATIN CALCIUM 5 MG/1
5 TABLET, COATED ORAL NIGHTLY
Qty: 90 TABLET | Refills: 0 | Status: SHIPPED | OUTPATIENT
Start: 2024-08-14 | End: 2024-11-12

## 2024-08-14 RX ORDER — LISINOPRIL 20 MG/1
20 TABLET ORAL DAILY
Qty: 90 TABLET | Refills: 0 | Status: SHIPPED | OUTPATIENT
Start: 2024-08-14 | End: 2024-11-12

## 2024-08-14 RX ORDER — PIOGLITAZONEHYDROCHLORIDE 30 MG/1
30 TABLET ORAL DAILY
Qty: 90 TABLET | Refills: 0 | Status: SHIPPED | OUTPATIENT
Start: 2024-08-14 | End: 2024-11-12

## 2024-08-14 RX ORDER — GLIMEPIRIDE 2 MG/1
4 TABLET ORAL
Qty: 180 TABLET | Refills: 0 | Status: SHIPPED | OUTPATIENT
Start: 2024-08-14 | End: 2024-11-12

## 2024-08-14 RX ORDER — MELOXICAM 15 MG/1
15 TABLET ORAL DAILY
Qty: 90 TABLET | Refills: 0 | Status: SHIPPED | OUTPATIENT
Start: 2024-08-14 | End: 2024-11-12

## 2024-08-14 NOTE — TELEPHONE ENCOUNTER
Last OV w/Dr Nice 11/29/23  Last labs 8/29/23  Last refills 5/13/24  Patient has a MAW with Dr Ncie on 8/28/24.

## 2024-08-14 NOTE — TELEPHONE ENCOUNTER
glimepiride 2 MG Oral Tab   lisinopril 20 MG Oral Tab   pioglitazone (ACTOS) 30 MG Oral Tab   Meloxicam 15 MG Oral Tab   bisoprolol-hydrochlorothiazide 10-6.25 MG Oral Tab   rosuvastatin 5 MG Oral Tab     2 DAYS LEFT  [facility]    [facility]

## 2024-08-23 PROBLEM — E66.01 MORBID OBESITY WITH BMI OF 45.0-49.9, ADULT (HCC): Status: ACTIVE | Noted: 2024-08-23

## 2024-08-27 ENCOUNTER — LABORATORY ENCOUNTER (OUTPATIENT)
Dept: LAB | Age: 69
End: 2024-08-27
Attending: INTERNAL MEDICINE
Payer: MEDICARE

## 2024-08-27 DIAGNOSIS — E11.9 TYPE 2 DIABETES MELLITUS WITHOUT COMPLICATION, WITHOUT LONG-TERM CURRENT USE OF INSULIN (HCC): ICD-10-CM

## 2024-08-27 DIAGNOSIS — I10 ESSENTIAL HYPERTENSION WITH GOAL BLOOD PRESSURE LESS THAN 140/90: ICD-10-CM

## 2024-08-27 DIAGNOSIS — E78.00 HYPERCHOLESTEREMIA: ICD-10-CM

## 2024-08-27 LAB
ALBUMIN SERPL-MCNC: 4.4 G/DL (ref 3.2–4.8)
ALBUMIN/GLOB SERPL: 1.6 {RATIO} (ref 1–2)
ALP LIVER SERPL-CCNC: 58 U/L
ALT SERPL-CCNC: 21 U/L
ANION GAP SERPL CALC-SCNC: 4 MMOL/L (ref 0–18)
AST SERPL-CCNC: 19 U/L (ref ?–34)
BASOPHILS # BLD AUTO: 0.04 X10(3) UL (ref 0–0.2)
BASOPHILS NFR BLD AUTO: 0.6 %
BILIRUB SERPL-MCNC: 0.7 MG/DL (ref 0.2–1.1)
BUN BLD-MCNC: 21 MG/DL (ref 9–23)
CALCIUM BLD-MCNC: 10 MG/DL (ref 8.7–10.4)
CHLORIDE SERPL-SCNC: 103 MMOL/L (ref 98–112)
CHOLEST SERPL-MCNC: 153 MG/DL (ref ?–200)
CO2 SERPL-SCNC: 33 MMOL/L (ref 21–32)
CREAT BLD-MCNC: 0.81 MG/DL
CREAT UR-SCNC: 119.9 MG/DL
EGFRCR SERPLBLD CKD-EPI 2021: 79 ML/MIN/1.73M2 (ref 60–?)
EOSINOPHIL # BLD AUTO: 0.4 X10(3) UL (ref 0–0.7)
EOSINOPHIL NFR BLD AUTO: 6.4 %
ERYTHROCYTE [DISTWIDTH] IN BLOOD BY AUTOMATED COUNT: 13.9 %
EST. AVERAGE GLUCOSE BLD GHB EST-MCNC: 131 MG/DL (ref 68–126)
FASTING PATIENT LIPID ANSWER: YES
FASTING STATUS PATIENT QL REPORTED: YES
GLOBULIN PLAS-MCNC: 2.7 G/DL (ref 2–3.5)
GLUCOSE BLD-MCNC: 145 MG/DL (ref 70–99)
HBA1C MFR BLD: 6.2 % (ref ?–5.7)
HCT VFR BLD AUTO: 39 %
HDLC SERPL-MCNC: 71 MG/DL (ref 40–59)
HGB BLD-MCNC: 12.4 G/DL
IMM GRANULOCYTES # BLD AUTO: 0.02 X10(3) UL (ref 0–1)
IMM GRANULOCYTES NFR BLD: 0.3 %
LDLC SERPL CALC-MCNC: 64 MG/DL (ref ?–100)
LYMPHOCYTES # BLD AUTO: 1.81 X10(3) UL (ref 1–4)
LYMPHOCYTES NFR BLD AUTO: 28.8 %
MCH RBC QN AUTO: 30.7 PG (ref 26–34)
MCHC RBC AUTO-ENTMCNC: 31.8 G/DL (ref 31–37)
MCV RBC AUTO: 96.5 FL
MICROALBUMIN UR-MCNC: 1.9 MG/DL
MICROALBUMIN/CREAT 24H UR-RTO: 15.8 UG/MG (ref ?–30)
MONOCYTES # BLD AUTO: 0.53 X10(3) UL (ref 0.1–1)
MONOCYTES NFR BLD AUTO: 8.4 %
NEUTROPHILS # BLD AUTO: 3.49 X10 (3) UL (ref 1.5–7.7)
NEUTROPHILS # BLD AUTO: 3.49 X10(3) UL (ref 1.5–7.7)
NEUTROPHILS NFR BLD AUTO: 55.5 %
NONHDLC SERPL-MCNC: 82 MG/DL (ref ?–130)
OSMOLALITY SERPL CALC.SUM OF ELEC: 296 MOSM/KG (ref 275–295)
PLATELET # BLD AUTO: 206 10(3)UL (ref 150–450)
POTASSIUM SERPL-SCNC: 4 MMOL/L (ref 3.5–5.1)
PROT SERPL-MCNC: 7.1 G/DL (ref 5.7–8.2)
RBC # BLD AUTO: 4.04 X10(6)UL
SODIUM SERPL-SCNC: 140 MMOL/L (ref 136–145)
TRIGL SERPL-MCNC: 99 MG/DL (ref 30–149)
VLDLC SERPL CALC-MCNC: 15 MG/DL (ref 0–30)
WBC # BLD AUTO: 6.3 X10(3) UL (ref 4–11)

## 2024-08-27 PROCEDURE — 80053 COMPREHEN METABOLIC PANEL: CPT

## 2024-08-27 PROCEDURE — 80061 LIPID PANEL: CPT

## 2024-08-27 PROCEDURE — 36415 COLL VENOUS BLD VENIPUNCTURE: CPT

## 2024-08-27 PROCEDURE — 85025 COMPLETE CBC W/AUTO DIFF WBC: CPT

## 2024-08-27 PROCEDURE — 82570 ASSAY OF URINE CREATININE: CPT

## 2024-08-27 PROCEDURE — 82043 UR ALBUMIN QUANTITATIVE: CPT

## 2024-08-27 PROCEDURE — 83036 HEMOGLOBIN GLYCOSYLATED A1C: CPT

## 2024-08-28 ENCOUNTER — OFFICE VISIT (OUTPATIENT)
Dept: FAMILY MEDICINE CLINIC | Facility: CLINIC | Age: 69
End: 2024-08-28
Payer: MEDICARE

## 2024-08-28 VITALS
RESPIRATION RATE: 18 BRPM | HEIGHT: 61 IN | OXYGEN SATURATION: 95 % | BODY MASS INDEX: 48.19 KG/M2 | WEIGHT: 255.25 LBS | TEMPERATURE: 98 F | DIASTOLIC BLOOD PRESSURE: 74 MMHG | SYSTOLIC BLOOD PRESSURE: 132 MMHG | HEART RATE: 64 BPM

## 2024-08-28 DIAGNOSIS — I10 ESSENTIAL HYPERTENSION WITH GOAL BLOOD PRESSURE LESS THAN 140/90: ICD-10-CM

## 2024-08-28 DIAGNOSIS — M17.0 PRIMARY OSTEOARTHRITIS OF BOTH KNEES: ICD-10-CM

## 2024-08-28 DIAGNOSIS — G47.33 OBSTRUCTIVE SLEEP APNEA: ICD-10-CM

## 2024-08-28 DIAGNOSIS — G89.29 CHRONIC PAIN OF BOTH SHOULDERS: ICD-10-CM

## 2024-08-28 DIAGNOSIS — E11.9 TYPE 2 DIABETES MELLITUS WITHOUT COMPLICATION, WITHOUT LONG-TERM CURRENT USE OF INSULIN (HCC): ICD-10-CM

## 2024-08-28 DIAGNOSIS — M25.511 CHRONIC PAIN OF BOTH SHOULDERS: ICD-10-CM

## 2024-08-28 DIAGNOSIS — M25.512 CHRONIC PAIN OF BOTH SHOULDERS: ICD-10-CM

## 2024-08-28 DIAGNOSIS — E78.00 HYPERCHOLESTEREMIA: ICD-10-CM

## 2024-08-28 DIAGNOSIS — Z00.00 ENCOUNTER FOR ANNUAL HEALTH EXAMINATION: Primary | ICD-10-CM

## 2024-08-28 PROCEDURE — G0439 PPPS, SUBSEQ VISIT: HCPCS | Performed by: INTERNAL MEDICINE

## 2024-08-28 RX ORDER — DULAGLUTIDE 1.5 MG/.5ML
1.5 INJECTION, SOLUTION SUBCUTANEOUS WEEKLY
Qty: 2 ML | Refills: 0 | Status: SHIPPED | OUTPATIENT
Start: 2024-08-28 | End: 2024-09-27

## 2024-08-28 RX ORDER — BISOPROLOL FUMARATE 10 MG/1
TABLET, FILM COATED ORAL
COMMUNITY
End: 2024-08-28

## 2024-08-28 NOTE — PROGRESS NOTES
Subjective:   Nicole Ruiz is a 69 year old female who presents for a Medicare Wellness Visit charge within the last 11 months and Patient may not meet criteria for AWV: Please evaluate for correct coding and scheduled follow up of multiple significant but stable problems.   Pt has been back from a world cruise. She enjoyed herself and no illnesses. She has been having more knee and shoulder pain and had gel shots in the knees bilaterally. She would like to try some PT shoulder. Up 2-3 x during the night for urination. She needs knee replacements, but BMI > 40.     History/Other:   Fall Risk Assessment:   She has been screened for Falls and is High Risk. Fall Prevention information provided to patient in After Visit Summary.    Do you feel unsteady when standing or walking?: No  Do you worry about falling?: No  Have you fallen in the past year?: Yes  How many times have you fallen?: (P) 2  Were you injured?: (P) No     Cognitive Assessment:   She had a completely normal cognitive assessment - see flowsheet entries     Functional Ability/Status:   Nicole Ruiz has some abnormal functions as listed below:  She has Hearing problems based on screening of functional status.She has Vision problems based on screening of functional status.       Depression Screening (PHQ):  PHQ-2 SCORE: 0  , done 8/28/2024        <5 minutes spent screening and counseling for depression    Advanced Directives:   She does have a Living Will but we do NOT have it on file in Epic.    She does have a POA but we do NOT have it on file in Epic.    Discussed Advance Care Planning with patient (and family/surrogate if present). Standard forms made available to patient in After Visit Summary.      Patient Active Problem List   Diagnosis    Type 2 diabetes mellitus without complication (HCC)    [place] (hard of hearing)    Hypercholesteremia    Cerumen impaction    Essential hypertension    Obstructive sleep apnea    Morbid  (severe) obesity due to excess calories (HCC)    Body mass index (BMI) 45.0-49.9, adult (HCC)    Morbid obesity with BMI of 45.0-49.9, adult (HCC)     Allergies:  She is allergic to prednisone and statins.    Current Medications:  Outpatient Medications Marked as Taking for the 24 encounter (Office Visit) with Grazyna Nice MD   Medication Sig    Dulaglutide (TRULICITY) 1.5 MG/0.5ML Subcutaneous Solution Pen-injector Inject 1.5 mg into the skin once a week.    glimepiride 2 MG Oral Tab Take 2 tablets (4 mg total) by mouth before breakfast.    lisinopril 20 MG Oral Tab Take 1 tablet (20 mg total) by mouth daily.    pioglitazone (ACTOS) 30 MG Oral Tab Take 1 tablet (30 mg total) by mouth daily.    Meloxicam 15 MG Oral Tab Take 1 tablet (15 mg total) by mouth daily.    bisoprolol-hydrochlorothiazide 10-6.25 MG Oral Tab Take 2 tablets by mouth daily.    rosuvastatin 5 MG Oral Tab Take 1 tablet (5 mg total) by mouth nightly.    METFORMIN  MG Oral Tablet 24 Hr TAKE 1 TABLET BY MOUTH DAILY WITH BREAKFAST    latanoprost 0.005 % Ophthalmic Solution Place 1 drop into both eyes nightly.       Medical History:  She  has a past medical history of Arthritis, Cataract, Diabetes mellitus (HCC) (7 years ago), Essential hypertension, Glaucoma, Glomerulonephritis (), Hearing impairment, Hearing loss, High cholesterol, History of cholecystectomy, Pain in joints (last 5 years), Sleep apnea, Sleep disturbance (4 years ago), Type II or unspecified type diabetes mellitus without mention of complication, not stated as uncontrolled, and Visual impairment.  Surgical History:  She  has a past surgical history that includes cholecystectomy; tonsillectomy; adenoidectomy; ; and colonoscopy (N/A, 10/25/2022).   Family History:  Her family history includes Heart Disorder in her father; Hypertension in her mother; Other in her father; Prostate Cancer in her father; Stroke in her maternal grandfather, maternal grandmother, and  paternal grandfather.  Social History:  She  reports that she has never smoked. She has never used smokeless tobacco. She reports current alcohol use of about 2.0 standard drinks of alcohol per week. She reports that she does not use drugs.    Tobacco:  She has never smoked tobacco.    CAGE Alcohol Screen:   CAGE screening score of 0 on 8/24/2024, showing low risk of alcohol abuse.      Patient Care Team:  Grazyna Nice MD as PCP - General (Family Medicine)  Grazyna Nice MD (Family Practice)  Alie Smith PT as Physical Therapist (Physical Therapy)    Review of Systems  GENERAL: feels well otherwise  SKIN: denies any unusual skin lesions  EYES: denies blurred vision or double vision  HEENT: denies nasal congestion, sinus pain or ST  LUNGS: denies shortness of breath with exertion  CARDIOVASCULAR: denies chest pain on exertion  GI: denies abdominal pain, denies heartburn  : denies dysuria, vaginal discharge or itching, no complaint of urinary incontinence   MUSCULOSKELETAL: denies back pain  NEURO: denies headaches  PSYCHE: denies depression or anxiety  HEMATOLOGIC: denies hx of anemia  ENDOCRINE: denies thyroid history  ALL/ASTHMA: denies hx of allergy or asthma    Objective:   Physical Exam  General Appearance:  Alert, cooperative, no distress, appears stated age   Head:  Normocephalic, without obvious abnormality, atraumatic   Eyes:  PERRL, conjunctiva/corneas clear, EOM's intact both eyes   Ears:  Normal TM's and external ear canals, both ears   Nose: Nares normal, septum midline,mucosa normal, no drainage or sinus tenderness   Throat: Lips, mucosa, and tongue normal; teeth and gums normal   Neck: Supple, symmetrical, trachea midline, no adenopathy;  thyroid: not enlarged, symmetric, no tenderness/mass/nodules; no carotid bruit or JVD   Back:   Symmetric, no curvature, ROM normal, no CVA tenderness   Lungs:   Clear to auscultation bilaterally, respirations unlabored   Heart:  Regular rate and rhythm, S1  and S2 normal, no murmur, rub, or gallop   Abdomen:   Soft, non-tender, bowel sounds active all four quadrants,  no masses, no organomegaly   Pelvic: Deferred   Extremities: Extremities normal, atraumatic, no cyanosis or edema   Pulses: 2+ and symmetric   Skin: Skin color, texture, turgor normal, no rashes or lesions   Lymph nodes: Cervical, supraclavicular, and axillary nodes normal   Neurologic: Normal       /74   Pulse 64   Temp 97.6 °F (36.4 °C) (Temporal)   Resp 18   Ht 5' 1\" (1.549 m)   Wt 255 lb 4 oz (115.8 kg)   SpO2 95%   BMI 48.23 kg/m²  Estimated body mass index is 48.23 kg/m² as calculated from the following:    Height as of this encounter: 5' 1\" (1.549 m).    Weight as of this encounter: 255 lb 4 oz (115.8 kg).    Medicare Hearing Assessment:   Hearing Screening    Time taken: 8/28/2024  9:22 AM  Entry User: Grazyna Nice MD  Screening Method: Whisper Test  Whisper Test Result: Pass (Comment: wearing hearing aids)         Visual Acuity:   Right Eye Visual Acuity: Corrected Right Eye Chart Acuity: 20/50   Left Eye Visual Acuity: Corrected Left Eye Chart Acuity: 20/40   Both Eyes Visual Acuity: Corrected Both Eyes Chart Acuity: 20/40   Able To Tolerate Visual Acuity: Yes        Assessment & Plan:   Nicole Ruiz is a 69 year old female who presents for a Medicare Assessment.     1. Encounter for annual health examination (Primary)  2. Type 2 diabetes mellitus without complication, without long-term current use of insulin (HCC)  3. Hypercholesteremia  4. Essential hypertension with goal blood pressure less than 140/90  5. Primary osteoarthritis of both knees  6. Obstructive sleep apnea  Other orders  -     Trulicity; Inject 1.5 mg into the skin once a week.  Dispense: 2 mL; Refill: 0    1. Encounter for annual health examination  done    2. Type 2 diabetes mellitus without complication, without long-term current use of insulin (HCC)  Well controlled, add trulicity for weight loss      3. Hypercholesteremia  Well controlled, CCM    4. Essential hypertension with goal blood pressure less than 140/90  Well controlled, CCM     5. Primary osteoarthritis of both knees  Needs weight loss for knee replacement    6. Obstructive sleep apnea  Compliant every night.          The patient indicates understanding of these issues and agrees to the plan.  Reinforced healthy diet, lifestyle, and exercise.      No follow-ups on file.     Grazyna Nice MD, 8/28/2024     Supplementary Documentation:   General Health:  In the past six months, have you lost more than 10 pounds without trying?: 2 - No  Has your appetite been poor?: No  Type of Diet: Balanced  How does the patient maintain a good energy level?: Appropriate Exercise  How would you describe your current health state?: Fair  How do you maintain positive mental well-being?: Social Interaction;Puzzles;Visiting Friends;Visiting Family  On a scale of 0 to 10, with 0 being no pain and 10 being severe pain, what is your pain level?: 6 - (Moderate)  In the past six months, have you experienced urine leakage?: 0-No  At any time do you feel concerned for the safety/well-being of yourself and/or your children, in your home or elsewhere?: No  Have you had any immunizations at another office such as Influenza, Hepatitis B, Tetanus, or Pneumococcal?: Yes    Health Maintenance   Topic Date Due    Mammogram  11/21/2023    Annual Depression Screening  01/01/2024    COVID-19 Vaccine (7 - 2023-24 season) 02/16/2024    Annual Physical  08/30/2024    Influenza Vaccine (1) 10/01/2024    Diabetes Care Foot Exam  11/15/2024    Diabetes Care Dilated Eye Exam  12/09/2024    Diabetes Care A1C  02/27/2025    Diabetes Care: GFR  08/27/2025    Diabetes Care: Microalb/Creat Ratio  08/27/2025    Colorectal Cancer Screening  10/25/2027    DEXA Scan  Completed    Fall Risk Screening (Annual)  Completed    Pneumococcal Vaccine: 65+ Years  Completed    Zoster Vaccines  Completed

## 2024-08-30 ENCOUNTER — TELEPHONE (OUTPATIENT)
Dept: FAMILY MEDICINE CLINIC | Facility: CLINIC | Age: 69
End: 2024-08-30

## 2024-08-30 NOTE — TELEPHONE ENCOUNTER
Received a fax from insurance stating that patient's Trulicity  1.5 mg/0.5 ml solution pen-injector was approved from 5/31/24 to 8/29/25. Approval information faxed to pharmacy.

## 2024-09-03 ENCOUNTER — MED REC SCAN ONLY (OUTPATIENT)
Dept: FAMILY MEDICINE CLINIC | Facility: CLINIC | Age: 69
End: 2024-09-03

## 2024-10-08 ENCOUNTER — OFFICE VISIT (OUTPATIENT)
Dept: PHYSICAL THERAPY | Age: 69
End: 2024-10-08
Attending: INTERNAL MEDICINE
Payer: MEDICARE

## 2024-10-08 DIAGNOSIS — M25.512 CHRONIC PAIN OF BOTH SHOULDERS: Primary | ICD-10-CM

## 2024-10-08 DIAGNOSIS — M25.511 CHRONIC PAIN OF BOTH SHOULDERS: Primary | ICD-10-CM

## 2024-10-08 DIAGNOSIS — G89.29 CHRONIC PAIN OF BOTH SHOULDERS: Primary | ICD-10-CM

## 2024-10-08 PROCEDURE — 97110 THERAPEUTIC EXERCISES: CPT

## 2024-10-08 PROCEDURE — 97161 PT EVAL LOW COMPLEX 20 MIN: CPT

## 2024-10-08 NOTE — PROGRESS NOTES
SHOULDER EVALUATION:     Diagnosis:   Chronic pain of both shoulders (M25.511,G89.29,M25.512)  Impingement and bicipital tendonitis              Referring Provider: Grazyna Nice  Date of Evaluation:    10/8/2024    Precautions:  None Next MD visit:   none scheduled  Date of Surgery: n/a     PATIENT SUMMARY   Nicole Ruiz is a 69 year old female who presents to therapy today with complaints of chronic shoulder pain B R>L impacting MRADLs. She reports R side is more painful than L and attributes it to using her arms to pull herself up the stairs due to knee pain. Due for gel shots end of this year, both knees.  Pain into wrists, mostly R lateral arm. She can fall asleep, but shoulder pain wakes her and it is difficult to get comfortable and fall back asleep. She states painful and difficulty reaching behind head.She also co difficulty rotating her neck   Pt describes pain level current 6/10, at best 5/10, at worst 7/10.   Current functional limitations include reaching behind, reaching over head, mod to heavy ADLs, lifting and carrying, meal prep, driving , sleep participation.     Nicole describes prior level of function no formal exercise, actively dieting and trying to be more active. Pt goals include \"upper body strength\".  Past medical history was reviewed with Nicole. Significant findings include  has a past medical history of Arthritis, Cataract, Diabetes mellitus (HCC) (7 years ago), Essential hypertension, Glaucoma, Glomerulonephritis (1973), Hearing impairment, Hearing loss, High cholesterol, History of cholecystectomy, Pain in joints (last 5 years), Sleep apnea, Sleep disturbance (4 years ago), Type II or unspecified type diabetes mellitus without mention of complication, not stated as uncontrolled, and Visual impairment.    ASSESSMENT  Nicole presents to physical therapy evaluation with primary c/o chronic shoulder pain B R>L impacting MRADLs. The results of the objective tests and  measures show limited AROM, decreased flexibility, poor postural control.  Functional deficits include but are not limited to reaching behind, reaching over head, mod to heavy ADLs, lifting and carrying, meal prep, driving , sleep participation.  Signs and symptoms are consistent with diagnosis of RTC impingement and bicipital tendonitis. Pt and PT discussed evaluation findings, pathology, POC and HEP.  Pt's comorbidities and psychosocial issues may impact PT POC and pt progress.Pt voiced understanding and performs HEP correctly without reported pain. Skilled Physical Therapy is medically necessary to address the above impairments and reach functional goals.     OBJECTIVE:   Observation/Posture: upper crossed posture, protracted R scap at rest  Palpation: TTP UT, levator and supra/infraspinatus  Sensation: light touch intact BUEs  Cervical Screen: WFL    AROM: (* denotes performed with pain)  Shoulder  Elbow   Flexion: R 145; L 154  Abduction: R 65*; L 85  ER: R 34*; L 55  IR: R 50*; L 65 FlexioWNlWNL; L WNL  Extension: R WNL; L WNL  Supination: R WNl, L WNl  Pronation: R WNL, L WNL     Flexibility: limited pecs, UT, levator and scalenes    Strength/MMT: (* denotes performed with pain)  Shoulder Elbow Scapular   Flexion: R 4-*/5; L 4+/5  Abduction: R 3-*/5; L 4/5  ER R 3-*/5; L 4/5  IR: R 3*/5; L 4/5 Flexion: R 4+/5; L 5/5  Extension: R 4+/5; L 5/5  Supination: R 4+/5; L 5/5  Pronation: R 5/5; L 5/5  Rhomboids: R2+/5, L 3/5  Mid trap: R 2+/5; L 3/5   Lats: R 2+/5, L 3/5  Low trap: R 2+/5; L 3/5     Special tests:   Labrum Special Testing:   Compression Rotation Test: R +, L -  Biceps Load II: R +, L -    Instability Special Testing:   Apprehension Test: R -, L -  Sulcus Sign: R -, L -  Load and Shift: Anterior -, Posterior -  Closed Chain UE Stability Test (push-up position, alt hand taps x15s): NT    Subacromial Pain Syndrome:  Empty Can test: R +, L -  Painful Arc Sign: R +, L -  External Rotation Resistance: R +,  L -  [place]-Chas Impingement Sign: R +, L -  Neer Impingement Test: R +, L -  Palpable Tenderness Infraspinatus and Supraspinatus: R +, L -    Long Head Biceps Tendinopathy:   Speed Test: R +, L -  Yergason's Test: R -, L -    Rotator Cuff Tears:   ER Lag Sign: R -, L -  Dropping Sign at 90 Deg ABD and 45 deg ER: R -, L -  IR Lag Sign: R -, L -    AC Compression: R -, L -   Strength: R 33, L 34.7 lbs   Norms: Male 20-24 = 121 lbs   75+ = 65 lbs   Female 20-24 = 70 lbs   75+ = 43 lbs    Symptom Modification:  Thoracic Extension: +   Scapular Position: +  Winging Scapula Manual Stabilization: NT  Humeral Head Position:    Posterior glide coupled with osteokinematic movement: NT   Contractile Tests (elevation with concurrent ER): NT    Today’s Treatment and Response:   Pt education was provided on exam findings, treatment diagnosis, treatment plan, expectations, and prognosis. Pt was also provided recommendations for activity modifications, possible soreness after evaluation, modalities as needed [ice/heat], postural corrections, and importance of remaining active.  Patient was instructed in and issued a HEP for: Access Code: [email or web address]  URL: [email or web address]  Date: 10/08/2024  Prepared by: Rachelle Smith    Exercises  - Seated Scapular Retraction  - 1 x daily - 7 x weekly - 3 sets - 10 reps - 5 sec hold  - Doorway Pec Stretch at 60 Degrees Abduction with Arm Straight  - 1 x daily - 7 x weekly - 3 sets - 1 reps - 30 sec hold  - Doorway Pec Stretch at 60 Elevation  - 1 x daily - 7 x weekly - 3 sets - 2 reps - 30 sec hold    Charges: PT Eval Low Complexity, 1 ther ex      Total Timed Treatment: 9 min     Total Treatment Time: 45 min     PLAN OF CARE:    Goals: (to be met in 10 visits)   Pt will report improved ability to sleep without waking due to shoulder pain   Pt will improve shoulder flexion AROM to >150 degrees to be able to reach into overhead cabinets without pain or restriction    Pt will improve shoulder abduction AROM to >110 degrees to improve ability to don deodorant, don/doff shirts, and wash hair   Pt will increase shoulder AROM ER to >45 to be able to reach and fasten seatbelt   Pt will increase shoulder AROM IR to >60 to be able to reach in back pocket, tuck in shirt, and turn steering wheel without pain  Pt will improve shoulder strength throughout to 4/5 to improve function with reaching overhead   Pt will demonstrate increased mid/low trap strength to 4/5 to promote improved shoulder mechanics and stabilization with lifting and reaching   Pt will be independent and compliant with comprehensive HEP to maintain progress achieved in PT       Frequency / Duration: Patient will be seen for 1-2 x/week or a total of 10 visits over a 90 day period. Treatment will include: Gait training, Manual Therapy, Mechanical Traction, Neuromuscular Re-education, Self-Care Home Management, Therapeutic Activities, Therapeutic Exercise, Home Exercise Program instruction, and Modalities to include: Electrical stimulation (unattended), Electrical stimulation (attended), and Ultrasound    Education or treatment limitation:  see assessent  Rehab Potential:good    QuickDASH Outcome Score  Score: 22.73 % (10/7/2024  9:08 AM)      Patient/Family/Caregiver was advised of these findings, precautions, and treatment options and has agreed to actively participate in planning and for this course of care.    Thank you for your referral. Please co-sign or sign and return this letter via fax as soon as possible to [phone]. If you have any questions, please contact me at Dept: [phone]    Sincerely,  Electronically signed by therapist: Alie Smith, PT, DPT  Physician's certification required: Yes  I certify the need for these services furnished under this plan of treatment and while under my care.    X___________________________________________________ Date____________________    Certification From:  10/8/2024  To:1/6/2025

## 2024-10-11 ENCOUNTER — APPOINTMENT (OUTPATIENT)
Dept: PHYSICAL THERAPY | Age: 69
End: 2024-10-11
Attending: INTERNAL MEDICINE
Payer: MEDICARE

## 2024-10-14 ENCOUNTER — OFFICE VISIT (OUTPATIENT)
Dept: PHYSICAL THERAPY | Age: 69
End: 2024-10-14
Attending: INTERNAL MEDICINE
Payer: MEDICARE

## 2024-10-14 PROCEDURE — 97110 THERAPEUTIC EXERCISES: CPT

## 2024-10-14 PROCEDURE — 97140 MANUAL THERAPY 1/> REGIONS: CPT

## 2024-10-14 NOTE — PROGRESS NOTES
Diagnosis:   Chronic pain of both shoulders (M25.511,G89.29,M25.512)  Impingement and bicipital tendonitis       Referring Provider: Grazyna Nice  Date of Evaluation:    10/8/24    Precautions:  None Next MD visit:   none scheduled  Date of Surgery: n/a   Insurance Primary/Secondary: MEDICARE / [place] INDEMNITY     # Auth Visits: Medical Necessity, progress notes every 10 visits            Subjective: Pt states her shoulder is ok, achy this AM.     Pain: 6/10      Objective:   All of the below objective measures are from evaluation for reference:  AROM: (* denotes performed with pain)  Shoulder    Flexion: R 145; L 154  Abduction: R 65*; L 85  ER: R 34*; L 55  IR: R 50*; L 65         Assessment: Session focused on soft tissue length at UT and levator and rhomboid with STM and self stretch.      Goals:   (to be met in 10 visits)   Pt will report improved ability to sleep without waking due to shoulder pain   Pt will improve shoulder flexion AROM to >150 degrees to be able to reach into overhead cabinets without pain or restriction   Pt will improve shoulder abduction AROM to >110 degrees to improve ability to don deodorant, don/doff shirts, and wash hair   Pt will increase shoulder AROM ER to >45 to be able to reach and fasten seatbelt   Pt will increase shoulder AROM IR to >60 to be able to reach in back pocket, tuck in shirt, and turn steering wheel without pain  Pt will improve shoulder strength throughout to 4/5 to improve function with reaching overhead   Pt will demonstrate increased mid/low trap strength to 4/5 to promote improved shoulder mechanics and stabilization with lifting and reaching   Pt will be independent and compliant with comprehensive HEP to maintain progress achieved in PT     Plan: Address ROM deficits, postural control, periscapular strength and neck and chest flexibility for return to PLOF.  Date: 10/14/2024  TX#: 2/10 Date:                 TX#: 3/ Date:                 TX#: 4/ Date:                  TX#: 5/ Date:   Tx#: 6/   MANUAL:  STM R UT, levator, c spine paraspinals, rhomboids 15 mins   Supine manual GH grade 3-4 posterior/inferior glide 2 mins          NEURO RE ED:  SL scap clock 10x2       THERE EX: 28  SL ER 10x2  Supine manual levator, UT stretch 30 sec 3x     Supine PROM R shoulder EOR stretching 10 mins  Supine manual pec stretch 30 sec 3x  Self stretch rhomboid and UT 30 sec 3x  Standing t band row low and standard 10x2 red band              HEP: Access Code: [email or web address]  URL: [email or web address]  Date: 10/08/2024  Prepared by: Rachelle Smith     Exercises  - Seated Scapular Retraction  - 1 x daily - 7 x weekly - 3 sets - 10 reps - 5 sec hold  - Doorway Pec Stretch at 60 Degrees Abduction with Arm Straight  - 1 x daily - 7 x weekly - 3 sets - 1 reps - 30 sec hold  - Doorway Pec Stretch at 60 Elevation  - 1 x daily - 7 x weekly - 3 sets - 2 reps - 30 sec hold  - Standing Lower Cervical and Upper Thoracic Stretch  - 1 x daily - 7 x weekly - 3 sets - 2 reps - 30 sec hold  - Seated Upper Trapezius Stretch  - 1 x daily - 7 x weekly - 3 sets - 10 reps  - TL Sidebending Stretch - Arms Crossed  - 1 x daily - 7 x weekly - 3 sets - 2 reps - 30 sec hold  - Gentle Levator Scapulae Stretch  - 1 x daily - 7 x weekly - 3 sets - 2 reps - 30 sec hold       Charges: 1 manual 1 ther eex       Total Timed Treatment: 45 min  Total Treatment Time: 45 min

## 2024-10-21 ENCOUNTER — OFFICE VISIT (OUTPATIENT)
Dept: PHYSICAL THERAPY | Age: 69
End: 2024-10-21
Attending: INTERNAL MEDICINE
Payer: MEDICARE

## 2024-10-21 PROCEDURE — 97110 THERAPEUTIC EXERCISES: CPT

## 2024-10-21 PROCEDURE — 97140 MANUAL THERAPY 1/> REGIONS: CPT

## 2024-10-21 NOTE — PROGRESS NOTES
Diagnosis:   Chronic pain of both shoulders (M25.511,G89.29,M25.512)  Impingement and bicipital tendonitis       Referring Provider: Grazyna Nice  Date of Evaluation:    10/8/24    Precautions:  None Next MD visit:   none scheduled  Date of Surgery: n/a   Insurance Primary/Secondary: MEDICARE / [place]DEMNITY     # Auth Visits: Medical Necessity, progress notes every 10 visits            Subjective: Pt states her shoulder is ok, achy this AM.     Pain: 6/10      Objective:   All of the below objective measures are from evaluation for reference:  AROM: (* denotes performed with pain)  Shoulder    Flexion: R 145; L 154  Abduction: R 65*; L 85  ER: R 34*; L 55  IR: R 50*; L 65         Assessment: Added scaption and chin tuck with scap set for improved postural control, mod tactile feedback for reduced UT recruitment.       Goals:   (to be met in 10 visits)   Pt will report improved ability to sleep without waking due to shoulder pain   Pt will improve shoulder flexion AROM to >150 degrees to be able to reach into overhead cabinets without pain or restriction   Pt will improve shoulder abduction AROM to >110 degrees to improve ability to don deodorant, don/doff shirts, and wash hair   Pt will increase shoulder AROM ER to >45 to be able to reach and fasten seatbelt   Pt will increase shoulder AROM IR to >60 to be able to reach in back pocket, tuck in shirt, and turn steering wheel without pain  Pt will improve shoulder strength throughout to 4/5 to improve function with reaching overhead   Pt will demonstrate increased mid/low trap strength to 4/5 to promote improved shoulder mechanics and stabilization with lifting and reaching   Pt will be independent and compliant with comprehensive HEP to maintain progress achieved in PT     Plan: Address ROM deficits, postural control, periscapular strength and neck and chest flexibility for return to PLOF.  Date: 10/14/2024  TX#: 2/10 Date: 10/21/24                TX#: 3/10  Date:                 TX#: 4/ Date:                 TX#: 5/ Date:   Tx#: 6/   MANUAL:  STM R UT, levator, c spine paraspinals, rhomboids 15 mins   Supine manual GH grade 3-4 posterior/inferior glide 2 mins    MANUAL:  STM R UT, levator, c spine paraspinals, rhomboids 15 mins   Supine manual GH grade 3-4 posterior/inferior glide 2 mins       NEURO RE ED:  SL scap clock 10x2 NEURO RE ED:  SL scap clock 10x2  Chin tucks with scap set 3\" hold 15x      THERE EX: 28  SL ER 10x2  Supine manual levator, UT stretch 30 sec 3x     Supine PROM R shoulder EOR stretching 10 mins  Supine manual pec stretch 30 sec 3x  Self stretch rhomboid and UT 30 sec 3x  Standing t band row low and standard 10x2 red band THERE EX: 30  UBE 3/3  BUE ER red band 15x  Supine manual levator, UT stretch 30 sec 3x     Supine PROM R shoulder EOR stretching 10 mins  Supine manual pec stretch 30 sec 3x  Self stretch rhomboid and UT 30 sec 3x  Standing t band row low and standard, scaption 10x2 red band             HEP: Access Code: [email or web address]  URL: [email or web address]  Date: 10/08/2024  Prepared by: Rachelle Smith     Exercises  - Seated Scapular Retraction  - 1 x daily - 7 x weekly - 3 sets - 10 reps - 5 sec hold  - Doorway Pec Stretch at 60 Degrees Abduction with Arm Straight  - 1 x daily - 7 x weekly - 3 sets - 1 reps - 30 sec hold  - Doorway Pec Stretch at 60 Elevation  - 1 x daily - 7 x weekly - 3 sets - 2 reps - 30 sec hold  - Standing Lower Cervical and Upper Thoracic Stretch  - 1 x daily - 7 x weekly - 3 sets - 2 reps - 30 sec hold  - Seated Upper Trapezius Stretch  - 1 x daily - 7 x weekly - 3 sets - 10 reps  - TL Sidebending Stretch - Arms Crossed  - 1 x daily - 7 x weekly - 3 sets - 2 reps - 30 sec hold  - Gentle Levator Scapulae Stretch  - 1 x daily - 7 x weekly - 3 sets - 2 reps - 30 sec hold       Charges: 1 manual 2 ther ex       Total Timed Treatment: 47 min  Total Treatment Time: 50 min

## 2024-10-31 ENCOUNTER — OFFICE VISIT (OUTPATIENT)
Dept: PHYSICAL THERAPY | Age: 69
End: 2024-10-31
Attending: INTERNAL MEDICINE
Payer: MEDICARE

## 2024-10-31 PROCEDURE — 97140 MANUAL THERAPY 1/> REGIONS: CPT

## 2024-10-31 PROCEDURE — 97110 THERAPEUTIC EXERCISES: CPT

## 2024-10-31 NOTE — PROGRESS NOTES
Diagnosis:   Chronic pain of both shoulders (M25.511,G89.29,M25.512)  Impingement and bicipital tendonitis       Referring Provider: Grazyna Nice  Date of Evaluation:    10/8/24    Precautions:  None Next MD visit:   none scheduled  Date of Surgery: n/a   Insurance Primary/Secondary: MEDICARE / [place] INDEMNITY     # Auth Visits: Medical Necessity, progress notes every 10 visits            Subjective: Pt states her shoulder is about the same. Has a little cold adding to pain and fatigue.    Pain: 4/10      Objective:   All of the below objective measures are from evaluation for reference:  AROM: (* denotes performed with pain)  Shoulder    Flexion: R 145; L 154  Abduction: R 65*; L 85  ER: R 34*; L 55  IR: R 50*; L 65         Assessment: Mod tactile and verbal cues for reduced UT recruitment and hike on ER.     Goals:   (to be met in 10 visits)   Pt will report improved ability to sleep without waking due to shoulder pain   Pt will improve shoulder flexion AROM to >150 degrees to be able to reach into overhead cabinets without pain or restriction   Pt will improve shoulder abduction AROM to >110 degrees to improve ability to don deodorant, don/doff shirts, and wash hair   Pt will increase shoulder AROM ER to >45 to be able to reach and fasten seatbelt   Pt will increase shoulder AROM IR to >60 to be able to reach in back pocket, tuck in shirt, and turn steering wheel without pain  Pt will improve shoulder strength throughout to 4/5 to improve function with reaching overhead   Pt will demonstrate increased mid/low trap strength to 4/5 to promote improved shoulder mechanics and stabilization with lifting and reaching   Pt will be independent and compliant with comprehensive HEP to maintain progress achieved in PT     Plan: Progress periscapular strength bicep strength and neck and chest foft tissue extensibility for return to PLOF.  Add to HEP 11/5/24.   Date: 10/14/2024  TX#: 2/10 Date: 10/21/24                TX#:  3/10 Date:  10/31/24               TX#: 4/10 Date:                 TX#: 5/ Date:   Tx#: 6/   MANUAL:  STM R UT, levator, c spine paraspinals, rhomboids 15 mins   Supine manual GH grade 3-4 posterior/inferior glide 2 mins    MANUAL:  STM R UT, levator, c spine paraspinals, rhomboids 15 mins   Supine manual GH grade 3-4 posterior/inferior glide 2 mins  MANUAL:  STM R UT, levator, c spine paraspinals, rhomboids and bicep cross friction and effleurage 15 mins   Supine manual GH grade 3-4 posterior/inferior glide 2 mins      NEURO RE ED:  SL scap clock 10x2 NEURO RE ED:  SL scap clock 10x2  Chin tucks with scap set 3\" hold 15x NEURO RE ED:  SL scap clock 10x2  Chin tucks with scap set 3\" hold 15x     THERE EX: 28  SL ER 10x2  Supine manual levator, UT stretch 30 sec 3x     Supine PROM R shoulder EOR stretching 10 mins  Supine manual pec stretch 30 sec 3x  Self stretch rhomboid and UT 30 sec 3x  Standing t band row low and standard 10x2 red band THERE EX: 30  UBE 3/3  BUE ER red band 15x  Supine manual levator, UT stretch 30 sec 3x     Supine PROM R shoulder EOR stretching 10 mins  Supine manual pec stretch 30 sec 3x  Self stretch rhomboid and UT 30 sec 3x  Standing t band row low and standard, scaption 10x2 red band THERE EX: 30  Nu step BUE and BLEs level 3 8 min  SL ER 10x3  BUE ER red band 15x2  Supine manual levator, UT stretch 30 sec 3x  Supine PROM R shoulder EOR stretching 10 mins  Supine manual pec stretch 30 sec 3x  Self stretch rhomboid and UT 30 sec 3x  Eccentric bicep curl 3# RUE seated 10x2            HEP: Access Code: [email or web address]  URL: [email or web address]  Date: 10/08/2024  Prepared by: Rachelle Smith     Exercises  - Seated Scapular Retraction  - 1 x daily - 7 x weekly - 3 sets - 10 reps - 5 sec hold  - Doorway Pec Stretch at 60 Degrees Abduction with Arm Straight  - 1 x daily - 7 x weekly - 3 sets - 1 reps - 30 sec hold  - Doorway Pec Stretch at 60 Elevation  - 1 x daily - 7 x weekly -  3 sets - 2 reps - 30 sec hold  - Standing Lower Cervical and Upper Thoracic Stretch  - 1 x daily - 7 x weekly - 3 sets - 2 reps - 30 sec hold  - Seated Upper Trapezius Stretch  - 1 x daily - 7 x weekly - 3 sets - 10 reps  - TL Sidebending Stretch - Arms Crossed  - 1 x daily - 7 x weekly - 3 sets - 2 reps - 30 sec hold  - Gentle Levator Scapulae Stretch  - 1 x daily - 7 x weekly - 3 sets - 2 reps - 30 sec hold       Charges: 1 manual 2 ther ex       Total Timed Treatment: 45 min  Total Treatment Time: 45 min

## 2024-11-04 NOTE — PROGRESS NOTES
Diagnosis:   Chronic pain of both shoulders (M25.511,G89.29,M25.512)  Impingement and bicipital tendonitis       Referring Provider: Grazyna Nice  Date of Evaluation:    10/8/24    Precautions:  None Next MD visit:   none scheduled  Date of Surgery: n/a   Insurance Primary/Secondary: MEDICARE / [employer] [place] INDEMNITY     # Auth Visits: Medical Necessity, progress notes every 10 visits            Subjective: Pt states her knees are feeling almost worst than her shoulders. She states she has been sleeping better and feels that helps.    Pain: 4/10 shoulders knees 5/10      Objective:   All of the below objective measures are from evaluation for reference:  AROM: (* denotes performed with pain)  Shoulder    Flexion: R 145; L 154  Abduction: R 65*; L 85  ER: R 34*; L 55  IR: R 50*; L 65         Assessment: Added band rows, min cues for t spine ext and reduced UT recruitment.    Goals:   (to be met in 10 visits)   Pt will report improved ability to sleep without waking due to shoulder pain   Pt will improve shoulder flexion AROM to >150 degrees to be able to reach into overhead cabinets without pain or restriction   Pt will improve shoulder abduction AROM to >110 degrees to improve ability to don deodorant, don/doff shirts, and wash hair   Pt will increase shoulder AROM ER to >45 to be able to reach and fasten seatbelt   Pt will increase shoulder AROM IR to >60 to be able to reach in back pocket, tuck in shirt, and turn steering wheel without pain  Pt will improve shoulder strength throughout to 4/5 to improve function with reaching overhead   Pt will demonstrate increased mid/low trap strength to 4/5 to promote improved shoulder mechanics and stabilization with lifting and reaching   Pt will be independent and compliant with comprehensive HEP to maintain progress achieved in PT     Plan: Progress periscapular strength bicep strength and neck and chest soft tissue extensibility for return to PLOF.  Add to HEP 11/5/24.   Date:  10/14/2024  TX#: 2/10 Date: 10/21/24                TX#: 3/10 Date:  10/31/24               TX#: 4/10 Date:  11/5/24               TX#: 5/10 Date:   Tx#: 6/   MANUAL:  STM R UT, levator, c spine paraspinals, rhomboids 15 mins   Supine manual GH grade 3-4 posterior/inferior glide 2 mins    MANUAL:  STM R UT, levator, c spine paraspinals, rhomboids 15 mins   Supine manual GH grade 3-4 posterior/inferior glide 2 mins  MANUAL:  STM R UT, levator, c spine paraspinals, rhomboids and bicep cross friction and effleurage 15 mins   Supine manual GH grade 3-4 posterior/inferior glide 2 mins  MANUAL: 13 min  STM R UT, levator, c spine paraspinals, rhomboids and bicep cross friction and effleurage 10 mins   Supine manual GH grade 3-4 posterior/inferior glide 3 mins     NEURO RE ED:  SL scap clock 10x2 NEURO RE ED:  SL scap clock 10x2  Chin tucks with scap set 3\" hold 15x NEURO RE ED:  SL scap clock 10x2  Chin tucks with scap set 3\" hold 15x NEURO RE ED:  SL scap clock 10x2  Chin tucks with scap set 3\" hold 15x  Standing yellow SB roll up with End range stretch 30 sec 3x after 10x2 flexion    THERE EX: 28  SL ER 10x2  Supine manual levator, UT stretch 30 sec 3x     Supine PROM R shoulder EOR stretching 10 mins  Supine manual pec stretch 30 sec 3x  Self stretch rhomboid and UT 30 sec 3x  Standing t band row low and standard 10x2 red band THERE EX: 30  UBE 3/3  BUE ER red band 15x  Supine manual levator, UT stretch 30 sec 3x     Supine PROM R shoulder EOR stretching 10 mins  Supine manual pec stretch 30 sec 3x  Self stretch rhomboid and UT 30 sec 3x  Standing t band row low and standard, scaption 10x2 red band THERE EX: 30  Nu step BUE and BLEs level 3 8 min  SL ER 10x3  BUE ER red band 15x2  Supine manual levator, UT stretch 30 sec 3x  Supine PROM R shoulder EOR stretching 10 mins  Supine manual pec stretch 30 sec 3x  Self stretch rhomboid and UT 30 sec 3x  Eccentric bicep curl 3# RUE seated 10x2 THERE EX: 30  Nu step BUE and BLEs  level 3 8 min  SL ER 10x3  BUE ER red band 15x2  Supine manual levator, UT stretch 30 sec 3x  Supine PROM R shoulder EOR stretching 10 mins  Supine manual pec stretch 30 sec 3x  Self stretch rhomboid and UT 30 sec 3x  Eccentric bicep curl 3# RUE seated 10x3  Red band row and shoulder ext 10x2             HEP: Access Code: [email or web address]  URL: [email or web address]  Date: 10/08/2024  Prepared by: Rachelle Smith     Exercises  - Seated Scapular Retraction  - 1 x daily - 7 x weekly - 3 sets - 10 reps - 5 sec hold  - Doorway Pec Stretch at 60 Degrees Abduction with Arm Straight  - 1 x daily - 7 x weekly - 3 sets - 1 reps - 30 sec hold  - Doorway Pec Stretch at 60 Elevation  - 1 x daily - 7 x weekly - 3 sets - 2 reps - 30 sec hold  - Standing Lower Cervical and Upper Thoracic Stretch  - 1 x daily - 7 x weekly - 3 sets - 2 reps - 30 sec hold  - Seated Upper Trapezius Stretch  - 1 x daily - 7 x weekly - 3 sets - 10 reps  - TL Sidebending Stretch - Arms Crossed  - 1 x daily - 7 x weekly - 3 sets - 2 reps - 30 sec hold  - Gentle Levator Scapulae Stretch  - 1 x daily - 7 x weekly - 3 sets - 2 reps - 30 sec hold  - Shoulder External Rotation and Scapular Retraction with Resistance  - 4 x weekly - 2 sets - 15 reps  - Standing Single Arm Eccentric Bicep Curl Pronated then Supinated  - 4 x weekly - 3 sets - 10 reps  - Standing Bilateral Low Shoulder Row with Anchored Resistance  - 4 x weekly - 2 sets - 10 reps  - Shoulder extension with resistance - Neutral  - 4 x weekly - 2 sets - 10 reps     Charges: 1 manual 2 ther ex       Total Timed Treatment: 45 min  Total Treatment Time: 45 min

## 2024-11-05 ENCOUNTER — OFFICE VISIT (OUTPATIENT)
Dept: PHYSICAL THERAPY | Age: 69
End: 2024-11-05
Attending: INTERNAL MEDICINE
Payer: MEDICARE

## 2024-11-05 PROCEDURE — 97140 MANUAL THERAPY 1/> REGIONS: CPT

## 2024-11-05 PROCEDURE — 97110 THERAPEUTIC EXERCISES: CPT

## 2024-11-09 DIAGNOSIS — I10 ESSENTIAL HYPERTENSION WITH GOAL BLOOD PRESSURE LESS THAN 140/90: ICD-10-CM

## 2024-11-09 DIAGNOSIS — E78.00 HYPERCHOLESTEREMIA: ICD-10-CM

## 2024-11-09 DIAGNOSIS — E11.9 TYPE 2 DIABETES MELLITUS WITHOUT COMPLICATION, WITHOUT LONG-TERM CURRENT USE OF INSULIN (HCC): ICD-10-CM

## 2024-11-09 DIAGNOSIS — M17.0 PRIMARY OSTEOARTHRITIS OF BOTH KNEES: ICD-10-CM

## 2024-11-11 RX ORDER — GLIMEPIRIDE 2 MG/1
4 TABLET ORAL
Qty: 180 TABLET | Refills: 0 | Status: SHIPPED | OUTPATIENT
Start: 2024-11-11

## 2024-11-11 RX ORDER — MELOXICAM 15 MG/1
15 TABLET ORAL DAILY
Qty: 90 TABLET | Refills: 0 | Status: SHIPPED | OUTPATIENT
Start: 2024-11-11

## 2024-11-11 RX ORDER — ROSUVASTATIN CALCIUM 5 MG/1
5 TABLET, COATED ORAL NIGHTLY
Qty: 90 TABLET | Refills: 0 | Status: SHIPPED | OUTPATIENT
Start: 2024-11-11

## 2024-11-11 RX ORDER — PIOGLITAZONE 30 MG/1
30 TABLET ORAL DAILY
Qty: 90 TABLET | Refills: 0 | Status: SHIPPED | OUTPATIENT
Start: 2024-11-11

## 2024-11-11 RX ORDER — LISINOPRIL 20 MG/1
20 TABLET ORAL DAILY
Qty: 90 TABLET | Refills: 0 | Status: SHIPPED | OUTPATIENT
Start: 2024-11-11 | End: 2025-02-09

## 2024-11-11 RX ORDER — BISOPROLOL FUMARATE AND HYDROCHLOROTHIAZIDE 10; 6.25 MG/1; MG/1
2 TABLET ORAL DAILY
Qty: 180 TABLET | Refills: 0 | Status: SHIPPED | OUTPATIENT
Start: 2024-11-11 | End: 2025-02-09

## 2024-11-11 NOTE — TELEPHONE ENCOUNTER
Last office visit:  8/28/24    Future Appointments   Date Time Provider Department Center   11/14/2024 11:30 AM Alie Smith PT [facility] [place]   11/22/2024 10:00 AM Alie Smith, PT [facility]   11/26/2024 11:30 AM Alie Smiht, PT [facility] [place]   12/2/2024 11:30 AM Alie Smith, PT [facility] [place]   12/5/2024 11:30 AM Alie Smith PT [facility] [place]   12/9/2024 11:30 AM Alie Smith, PT [facility]   12/12/2024 11:30 AM Alie Smith, PT [facility]     LISINOPRIL 20 MG Oral Tab     Hypertension Medications Protocol [id number]/09/2024 09:35 AM   Protocol Details CMP or BMP in past 12 months    Last BP reading less than 140/90    In person appointment or virtual visit in the past 12 mos or appointment in next 3 mos    EGFRCR or GFRNAA > 50      Last filled:  8/14/24  #90  Last labs:  8/27/24  eGFR-Cr: 79  Last BP:  132/74    GLIMEPIRIDE 2 MG Oral Tab     Diabetes Medication Protocol Mzkdcg3511/09/2024 09:35 AM   Protocol Details Last A1C < 7.5 and within past 6 months    In person appointment or virtual visit in the past 6 mos or appointment in next 3 mos    Microalbumin procedure in past 12 months or taking ACE/ARB    EGFRCR or GFRNAA > 50    GFR in the past 12 months      Last filled:  8/14/24 #180  Last labs:  8/27/24   A1C: 6.2  eGFR-Cr: 79 microalbum done     MELOXICAM 15 MG Oral Tab     Non-Narcotic Pain Medication Protocol Ufbpms6311/09/2024 09:35 AM   Protocol Details In person appointment or virtual visit in the past 6 mos or appointment in next 3 mos      Last filled:  8/14/24  #90  Last labs:  8/27/24     BISOPROLOL-HYDROCHLOROTHIAZIDE 10-6.25 MG Oral Tab     Hypertension Medications Protocol Pykgye3011/09/2024 09:35 AM   Protocol Details CMP or BMP in past 12 months    Last BP reading less than 140/90    In person appointment or virtual visit in the past 12 mos or appointment in next 3 mos    EGFRCR or GFRNAA > 50      Last filled:  8/14/24  #180   Last labs: 8/27/24 eGFR-Cr:  79  Last BP:  132/74     PIOGLITAZONE 30 MG Oral Tab     Diabetes Medication Protocol Komswo6611/09/2024 09:35 AM   Protocol Details Last A1C < 7.5 and within past 6 months    In person appointment or virtual visit in the past 6 mos or appointment in next 3 mos    Microalbumin procedure in past 12 months or taking ACE/ARB    EGFRCR or GFRNAA > 50    GFR in the past 12 months      Last filled:  8/14/24  #90  Last labs:  8/27/24 A1C: 6.2  eGFR-Cr: 79 microalbum done      ROSUVASTATIN 5 MG Oral Tab     Cholesterol Medication Protocol Fmmhvj7011/09/2024 09:35 AM   Protocol Details ALT < 80    ALT resulted within past year    Lipid panel within past 12 months    In person appointment or virtual visit in the past 12 mos or appointment in next 3 mos      Last filled:  8/14/24  #90  Last labs:  8/27/24  ALT: 21  lipid done

## 2024-11-14 ENCOUNTER — OFFICE VISIT (OUTPATIENT)
Dept: PHYSICAL THERAPY | Age: 69
End: 2024-11-14
Attending: INTERNAL MEDICINE
Payer: MEDICARE

## 2024-11-14 PROCEDURE — 97140 MANUAL THERAPY 1/> REGIONS: CPT

## 2024-11-14 PROCEDURE — 97110 THERAPEUTIC EXERCISES: CPT

## 2024-11-14 NOTE — PROGRESS NOTES
Diagnosis:   Chronic pain of both shoulders (M25.511,G89.29,M25.512)  Impingement and bicipital tendonitis       Referring Provider: Grazyna Nice  Date of Evaluation:    10/8/24    Precautions:  None Next MD visit:   none scheduled  Date of Surgery: n/a   Insurance Primary/Secondary: MEDICARE / [employer] [place] INDEMNITY     # Auth Visits: Medical Necessity, progress notes every 10 visits            Subjective: Pt states she is sleeping better, overall improvement in shoulders but knees remain painful.    Pain: 4/10 shoulders knees 5/10      Objective:   All of the below objective measures are from evaluation for reference:  AROM: (* denotes performed with pain)  Shoulder    Flexion: R 145; L 154  Abduction: R 65*; L 85  ER: R 34*; L 55  IR: R 50*; L 65         Assessment: Focus on posture, t spine EXT and scap set prior to PRE as pt tends to protract B limiting AROM.    Goals:   (to be met in 10 visits)   Pt will report improved ability to sleep without waking due to shoulder pain   Pt will improve shoulder flexion AROM to >150 degrees to be able to reach into overhead cabinets without pain or restriction   Pt will improve shoulder abduction AROM to >110 degrees to improve ability to don deodorant, don/doff shirts, and wash hair   Pt will increase shoulder AROM ER to >45 to be able to reach and fasten seatbelt   Pt will increase shoulder AROM IR to >60 to be able to reach in back pocket, tuck in shirt, and turn steering wheel without pain  Pt will improve shoulder strength throughout to 4/5 to improve function with reaching overhead   Pt will demonstrate increased mid/low trap strength to 4/5 to promote improved shoulder mechanics and stabilization with lifting and reaching   Pt will be independent and compliant with comprehensive HEP to maintain progress achieved in PT     Plan: Progress periscapular strength bicep strength and neck and chest soft tissue extensibility for return to PLOF.  Add to HEP 11/5/24.   Date:  10/14/2024  TX#: 2/10 Date: 10/21/24                TX#: 3/10 Date:  10/31/24               TX#: 4/10 Date:  11/5/24               TX#: 5/10 Date: 11/14/24  Tx#: 6/10   MANUAL:  STM R UT, levator, c spine paraspinals, rhomboids 15 mins   Supine manual GH grade 3-4 posterior/inferior glide 2 mins    MANUAL:  STM R UT, levator, c spine paraspinals, rhomboids 15 mins   Supine manual GH grade 3-4 posterior/inferior glide 2 mins  MANUAL:  STM R UT, levator, c spine paraspinals, rhomboids and bicep cross friction and effleurage 15 mins   Supine manual GH grade 3-4 posterior/inferior glide 2 mins  MANUAL: 13 min  STM R UT, levator, c spine paraspinals, rhomboids and bicep cross friction and effleurage 10 mins   Supine manual GH grade 3-4 posterior/inferior glide 3 mins  MANUAL: 13 min  STM R UT, levator, c spine paraspinals, rhomboids and bicep cross friction and effleurage 10 mins   Supine manual GH grade 3-4 posterior/inferior glide 3 mins    NEURO RE ED:  SL scap clock 10x2 NEURO RE ED:  SL scap clock 10x2  Chin tucks with scap set 3\" hold 15x NEURO RE ED:  SL scap clock 10x2  Chin tucks with scap set 3\" hold 15x NEURO RE ED:  SL scap clock 10x2  Chin tucks with scap set 3\" hold 15x  Standing yellow SB roll up with End range stretch 30 sec 3x after 10x2 flexion NEURO RE ED:  Chin tucks with scap set 3\" hold 15x  Standing yellow SB roll up with End range stretch 30 sec 3x after 10x2 flexion   THERE EX: 28  SL ER 10x2  Supine manual levator, UT stretch 30 sec 3x     Supine PROM R shoulder EOR stretching 10 mins  Supine manual pec stretch 30 sec 3x  Self stretch rhomboid and UT 30 sec 3x  Standing t band row low and standard 10x2 red band THERE EX: 30  UBE 3/3  BUE ER red band 15x  Supine manual levator, UT stretch 30 sec 3x     Supine PROM R shoulder EOR stretching 10 mins  Supine manual pec stretch 30 sec 3x  Self stretch rhomboid and UT 30 sec 3x  Standing t band row low and standard, scaption 10x2 red band THERE EX:  30  Nu step BUE and BLEs level 3 8 min  SL ER 10x3  BUE ER red band 15x2  Supine manual levator, UT stretch 30 sec 3x  Supine PROM R shoulder EOR stretching 10 mins  Supine manual pec stretch 30 sec 3x  Self stretch rhomboid and UT 30 sec 3x  Eccentric bicep curl 3# RUE seated 10x2 THERE EX: 30  Nu step BUE and BLEs level 3 8 min  SL ER 10x3  BUE ER red band 15x2  Supine manual levator, UT stretch 30 sec 3x  Supine PROM R shoulder EOR stretching 10 mins  Supine manual pec stretch 30 sec 3x  Self stretch rhomboid and UT 30 sec 3x  Eccentric bicep curl 3# RUE seated 10x3  Red band row and shoulder ext 10x2   THERE EX: 30  Nu step BUE and BLEs level 3 8 min  SL ER 10x3  BUE ER red band 15x2  Supine manual levator, UT stretch 30 sec 3x  Supine PROM R shoulder EOR stretching 10 mins  Supine manual pec stretch 30 sec 3x  Self stretch rhomboid and UT 30 sec 3x  Eccentric bicep curl 3# RUE seated 10x3  Red band row and shoulder ext 10x2  SB yellow AAROM FLEX 10x3          HEP: Access Code: [email or web address]  URL: [email or web address]  Date: 10/08/2024  Prepared by: Rachelle Smith     Exercises  - Seated Scapular Retraction  - 1 x daily - 7 x weekly - 3 sets - 10 reps - 5 sec hold  - Doorway Pec Stretch at 60 Degrees Abduction with Arm Straight  - 1 x daily - 7 x weekly - 3 sets - 1 reps - 30 sec hold  - Doorway Pec Stretch at 60 Elevation  - 1 x daily - 7 x weekly - 3 sets - 2 reps - 30 sec hold  - Standing Lower Cervical and Upper Thoracic Stretch  - 1 x daily - 7 x weekly - 3 sets - 2 reps - 30 sec hold  - Seated Upper Trapezius Stretch  - 1 x daily - 7 x weekly - 3 sets - 10 reps  - TL Sidebending Stretch - Arms Crossed  - 1 x daily - 7 x weekly - 3 sets - 2 reps - 30 sec hold  - Gentle Levator Scapulae Stretch  - 1 x daily - 7 x weekly - 3 sets - 2 reps - 30 sec hold  - Shoulder External Rotation and Scapular Retraction with Resistance  - 4 x weekly - 2 sets - 15 reps  - Standing Single Arm Eccentric Bicep  Curl Pronated then Supinated  - 4 x weekly - 3 sets - 10 reps  - Standing Bilateral Low Shoulder Row with Anchored Resistance  - 4 x weekly - 2 sets - 10 reps  - Shoulder extension with resistance - Neutral  - 4 x weekly - 2 sets - 10 reps     Charges: 1 manual 2 ther ex       Total Timed Treatment: 45 min  Total Treatment Time: 45 min

## 2024-11-22 ENCOUNTER — OFFICE VISIT (OUTPATIENT)
Dept: PHYSICAL THERAPY | Age: 69
End: 2024-11-22
Attending: INTERNAL MEDICINE
Payer: MEDICARE

## 2024-11-22 PROCEDURE — 97112 NEUROMUSCULAR REEDUCATION: CPT

## 2024-11-22 PROCEDURE — 97110 THERAPEUTIC EXERCISES: CPT

## 2024-11-22 NOTE — PROGRESS NOTES
Diagnosis:   Chronic pain of both shoulders (M25.511,G89.29,M25.512)  Impingement and bicipital tendonitis       Referring Provider: Grazyna Nice  Date of Evaluation:    10/8/24    Precautions:  None Next MD visit:   none scheduled  Date of Surgery: n/a   Insurance Primary/Secondary: MEDICARE / [employer] [place] INDEMNITY     # Auth Visits: Medical Necessity, progress notes every 10 visits            Subjective: Pt states her shoulders are feeling better, her knees are more bothersome, depending on the day.     Pain: 4/10 shoulders knees 5/10      Objective:   All of the below objective measures are from evaluation for reference:  AROM: (* denotes performed with pain)  Shoulder    Flexion: R 145; L 154  Abduction: R 65*; L 85  ER: R 34*; L 55  IR: R 50*; L 65         Assessment: Pt progressing with form on rows, still require cues for scap position on ER/IR.    Goals:   (to be met in 10 visits)   Pt will report improved ability to sleep without waking due to shoulder pain   Pt will improve shoulder flexion AROM to >150 degrees to be able to reach into overhead cabinets without pain or restriction   Pt will improve shoulder abduction AROM to >110 degrees to improve ability to don deodorant, don/doff shirts, and wash hair   Pt will increase shoulder AROM ER to >45 to be able to reach and fasten seatbelt   Pt will increase shoulder AROM IR to >60 to be able to reach in back pocket, tuck in shirt, and turn steering wheel without pain  Pt will improve shoulder strength throughout to 4/5 to improve function with reaching overhead   Pt will demonstrate increased mid/low trap strength to 4/5 to promote improved shoulder mechanics and stabilization with lifting and reaching   Pt will be independent and compliant with comprehensive HEP to maintain progress achieved in PT     Plan: Progress t spine mobility and shoulder strength for improved postural control.  Date:  10/31/24               TX#: 4/10 Date:  11/5/24               TX#:  5/10 Date: 11/14/24  Tx#: 6/10 Date: 11/22/24  TX#: 7/10   MANUAL:  STM R UT, levator, c spine paraspinals, rhomboids and bicep cross friction and effleurage 15 mins   Supine manual GH grade 3-4 posterior/inferior glide 2 mins  MANUAL: 13 min  STM R UT, levator, c spine paraspinals, rhomboids and bicep cross friction and effleurage 10 mins   Supine manual GH grade 3-4 posterior/inferior glide 3 mins  MANUAL: 13 min  STM R UT, levator, c spine paraspinals, rhomboids and bicep cross friction and effleurage 10 mins   Supine manual GH grade 3-4 posterior/inferior glide 3 mins  MANUAL: 13 min  STM R UT, levator, c spine paraspinals, rhomboids and bicep cross friction and effleurage 10 mins   Supine manual GH grade 3-4 posterior/inferior glide 3 mins    NEURO RE ED:  SL scap clock 10x2  Chin tucks with scap set 3\" hold 15x NEURO RE ED:  SL scap clock 10x2  Chin tucks with scap set 3\" hold 15x  Standing yellow SB roll up with End range stretch 30 sec 3x after 10x2 flexion NEURO RE ED:  Chin tucks with scap set 3\" hold 15x  Standing yellow SB roll up with End range stretch 30 sec 3x after 10x2 flexion NEURO RE ED: 8 min  Chin tucks with scap set 3\" hold 15x  Standing yellow SB roll up with End range stretch 30 sec 3x after 10x2 flexion  SL scap clocks 10x2   THERE EX: 30  Nu step BUE and BLEs level 3 8 min  SL ER 10x3  BUE ER red band 15x2  Supine manual levator, UT stretch 30 sec 3x  Supine PROM R shoulder EOR stretching 10 mins  Supine manual pec stretch 30 sec 3x  Self stretch rhomboid and UT 30 sec 3x  Eccentric bicep curl 3# RUE seated 10x2 THERE EX: 30  Nu step BUE and BLEs level 3 8 min  SL ER 10x3  BUE ER red band 15x2  Supine manual levator, UT stretch 30 sec 3x  Supine PROM R shoulder EOR stretching 10 mins  Supine manual pec stretch 30 sec 3x  Self stretch rhomboid and UT 30 sec 3x  Eccentric bicep curl 3# RUE seated 10x3  Red band row and shoulder ext 10x2   THERE EX: 30  Nu step BUE and BLEs level 3 8 min  SL  ER 10x3  BUE ER red band 15x2  Supine manual levator, UT stretch 30 sec 3x  Supine PROM R shoulder EOR stretching 10 mins  Supine manual pec stretch 30 sec 3x  Self stretch rhomboid and UT 30 sec 3x  Eccentric bicep curl 3# RUE seated 10x3  Red band row and shoulder ext 10x2  SB yellow AAROM FLEX 10x3 THERE EX:  24 min  Nu step BUE and BLEs level 3 8 min  SL ER 10x3 1#  BUE ER red band 15x2  Supine manual levator, UT stretch 30 sec 3x  Supine PROM R shoulder EOR stretching 10 mins  Supine manual pec stretch 30 sec 3x  Eccentric bicep curl 3# RUE seated 10x3  Red band row and shoulder ext 10x2  SB yellow AAROM FLEX 10x3  Yellow SB low trap lift off 12x  Standing doorway pec stretch 30 sec 3x  Red band IR/ER 10x          HEP: Access Code: [email or web address]  URL: [email or web address]    Date: 10/08/2024  Prepared by: Rachelle Smith     Exercises  - Seated Scapular Retraction  - 1 x daily - 7 x weekly - 3 sets - 10 reps - 5 sec hold  - Doorway Pec Stretch at 60 Degrees Abduction with Arm Straight  - 1 x daily - 7 x weekly - 3 sets - 1 reps - 30 sec hold  - Doorway Pec Stretch at 60 Elevation  - 1 x daily - 7 x weekly - 3 sets - 2 reps - 30 sec hold  - Standing Lower Cervical and Upper Thoracic Stretch  - 1 x daily - 7 x weekly - 3 sets - 2 reps - 30 sec hold  - Seated Upper Trapezius Stretch  - 1 x daily - 7 x weekly - 3 sets - 10 reps  - TL Sidebending Stretch - Arms Crossed  - 1 x daily - 7 x weekly - 3 sets - 2 reps - 30 sec hold  - Gentle Levator Scapulae Stretch  - 1 x daily - 7 x weekly - 3 sets - 2 reps - 30 sec hold  - Shoulder External Rotation and Scapular Retraction with Resistance  - 4 x weekly - 2 sets - 15 reps  - Standing Single Arm Eccentric Bicep Curl Pronated then Supinated  - 4 x weekly - 3 sets - 10 reps  - Standing Bilateral Low Shoulder Row with Anchored Resistance  - 4 x weekly - 2 sets - 10 reps  - Shoulder extension with resistance - Neutral  - 4 x weekly - 2 sets - 10 reps   -  Doorway Pec Stretch at 60 Degrees Abduction with Arm Straight  - 1 x daily - 7 x weekly - 3 sets - 3 reps - 30 sec hold  Charges: 1 neuro re ed 2 ther ex       Total Timed Treatment: 45 min  Total Treatment Time: 45 min

## 2024-11-26 ENCOUNTER — OFFICE VISIT (OUTPATIENT)
Dept: PHYSICAL THERAPY | Age: 69
End: 2024-11-26
Attending: INTERNAL MEDICINE
Payer: MEDICARE

## 2024-11-26 PROCEDURE — 97110 THERAPEUTIC EXERCISES: CPT

## 2024-11-26 PROCEDURE — 97112 NEUROMUSCULAR REEDUCATION: CPT

## 2024-11-26 NOTE — PROGRESS NOTES
Diagnosis:   Chronic pain of both shoulders (M25.511,G89.29,M25.512)  Impingement and bicipital tendonitis       Referring Provider: Grazyna Nice  Date of Evaluation:    10/8/24    Precautions:  None Next MD visit:   none scheduled  Date of Surgery: n/a   Insurance Primary/Secondary: MEDICARE / [employer] [place] INDEMNITY     # Auth Visits: Medical Necessity, progress notes every 10 visits            Subjective: Pt states her shoulders feel pretty good, knees are the most painful.     Pain: 4/10  knees       Objective:   All of the below objective measures are from evaluation for reference:  AROM: (* denotes performed with pain)  Shoulder    Flexion: R 145; L 154  Abduction: R 65*; L 85  ER: R 34*; L 55  IR: R 50*; L 65         AssessmentAdded scap wall clock to challenge stability and endurance. Min cues for posture throughout PREs.    Goals:   (to be met in 10 visits)   Pt will report improved ability to sleep without waking due to shoulder pain   Pt will improve shoulder flexion AROM to >150 degrees to be able to reach into overhead cabinets without pain or restriction   Pt will improve shoulder abduction AROM to >110 degrees to improve ability to don deodorant, don/doff shirts, and wash hair   Pt will increase shoulder AROM ER to >45 to be able to reach and fasten seatbelt   Pt will increase shoulder AROM IR to >60 to be able to reach in back pocket, tuck in shirt, and turn steering wheel without pain  Pt will improve shoulder strength throughout to 4/5 to improve function with reaching overhead   Pt will demonstrate increased mid/low trap strength to 4/5 to promote improved shoulder mechanics and stabilization with lifting and reaching   Pt will be independent and compliant with comprehensive HEP to maintain progress achieved in PT     Plan: Progress t spine mobility and shoulder strength for improved postural control. Add t spine extension over chair back next session.   Date:  10/31/24               TX#: 4/10 Date:   11/5/24               TX#: 5/10 Date: 11/14/24  Tx#: 6/10 Date: 11/22/24  TX#: 7/10 Date: 11/26/24  TX#: 8/10   MANUAL:  STM R UT, levator, c spine paraspinals, rhomboids and bicep cross friction and effleurage 15 mins   Supine manual GH grade 3-4 posterior/inferior glide 2 mins  MANUAL: 13 min  STM R UT, levator, c spine paraspinals, rhomboids and bicep cross friction and effleurage 10 mins   Supine manual GH grade 3-4 posterior/inferior glide 3 mins  MANUAL: 13 min  STM R UT, levator, c spine paraspinals, rhomboids and bicep cross friction and effleurage 10 mins   Supine manual GH grade 3-4 posterior/inferior glide 3 mins  MANUAL: 13 min  STM R UT, levator, c spine paraspinals, rhomboids and bicep cross friction and effleurage 10 mins   Supine manual GH grade 3-4 posterior/inferior glide 3 mins  MANUAL: 10 min  STM R UT, levator, c spine paraspinals, rhomboids and bicep cross friction and effleurage 8 mins   Supine manual GH grade 3-4 posterior/inferior glide 2 mins    NEURO RE ED:  SL scap clock 10x2  Chin tucks with scap set 3\" hold 15x NEURO RE ED:  SL scap clock 10x2  Chin tucks with scap set 3\" hold 15x  Standing yellow SB roll up with End range stretch 30 sec 3x after 10x2 flexion NEURO RE ED:  Chin tucks with scap set 3\" hold 15x  Standing yellow SB roll up with End range stretch 30 sec 3x after 10x2 flexion NEURO RE ED: 8 min  Chin tucks with scap set 3\" hold 15x  Standing yellow SB roll up with End range stretch 30 sec 3x after 10x2 flexion  SL scap clocks 10x2 NEURO RE ED: 8 min  Chin tucks with scap set 3\" hold 15x  Standing yellow SB roll up with End range stretch 30 sec 3x after 10x2 flexion  Yellow band wall clock 5x2 each arm    THERE EX: 30  Nu step BUE and BLEs level 3 8 min  SL ER 10x3  BUE ER red band 15x2  Supine manual levator, UT stretch 30 sec 3x  Supine PROM R shoulder EOR stretching 10 mins  Supine manual pec stretch 30 sec 3x  Self stretch rhomboid and UT 30 sec 3x  Eccentric bicep curl  3# RUE seated 10x2 THERE EX: 30  Nu step BUE and BLEs level 3 8 min  SL ER 10x3  BUE ER red band 15x2  Supine manual levator, UT stretch 30 sec 3x  Supine PROM R shoulder EOR stretching 10 mins  Supine manual pec stretch 30 sec 3x  Self stretch rhomboid and UT 30 sec 3x  Eccentric bicep curl 3# RUE seated 10x3  Red band row and shoulder ext 10x2   THERE EX: 30  Nu step BUE and BLEs level 3 8 min  SL ER 10x3  BUE ER red band 15x2  Supine manual levator, UT stretch 30 sec 3x  Supine PROM R shoulder EOR stretching 10 mins  Supine manual pec stretch 30 sec 3x  Self stretch rhomboid and UT 30 sec 3x  Eccentric bicep curl 3# RUE seated 10x3  Red band row and shoulder ext 10x2  SB yellow AAROM FLEX 10x3 THERE EX:  24 min  Nu step BUE and BLEs level 3 8 min  SL ER 10x3 1#  BUE ER red band 15x2  Supine manual levator, UT stretch 30 sec 3x  Supine PROM R shoulder EOR stretching 10 mins  Supine manual pec stretch 30 sec 3x  Eccentric bicep curl 3# RUE seated 10x3  Red band row and shoulder ext 10x2  SB yellow AAROM FLEX 10x3  Yellow SB low trap lift off 12x  Standing doorway pec stretch 30 sec 3x  Red band IR/ER 10x  THERE EX:  27 min  Nu step BUE and BLEs level 3 10 min  SL ER 10x3 1#  BUE ER red band 15x2  Supine manual levator, UT stretch 30 sec 3x  Supine PROM R shoulder EOR stretching 10 mins  Supine manual pec stretch 30 sec 3x  Eccentric bicep curl 3# RUE seated 10x3  Red band row and shoulder ext, scaption 10x2  SB yellow AAROM FLEX 10x3  Yellow SB low trap lift off 15x  Standing doorway pec stretch 30 sec 3x  Red band IR/ER 10x2 ea           HEP: Access Code: [email or web address]  URL: [email or web address]    Date: 10/08/2024  Prepared by: Rachelle Smith     Exercises  - Seated Scapular Retraction  - 1 x daily - 7 x weekly - 3 sets - 10 reps - 5 sec hold  - Doorway Pec Stretch at 60 Degrees Abduction with Arm Straight  - 1 x daily - 7 x weekly - 3 sets - 1 reps - 30 sec hold  - Doorway Pec Stretch at 60  Elevation  - 1 x daily - 7 x weekly - 3 sets - 2 reps - 30 sec hold  - Standing Lower Cervical and Upper Thoracic Stretch  - 1 x daily - 7 x weekly - 3 sets - 2 reps - 30 sec hold  - Seated Upper Trapezius Stretch  - 1 x daily - 7 x weekly - 3 sets - 10 reps  - TL Sidebending Stretch - Arms Crossed  - 1 x daily - 7 x weekly - 3 sets - 2 reps - 30 sec hold  - Gentle Levator Scapulae Stretch  - 1 x daily - 7 x weekly - 3 sets - 2 reps - 30 sec hold  - Shoulder External Rotation and Scapular Retraction with Resistance  - 4 x weekly - 2 sets - 15 reps  - Standing Single Arm Eccentric Bicep Curl Pronated then Supinated  - 4 x weekly - 3 sets - 10 reps  - Standing Bilateral Low Shoulder Row with Anchored Resistance  - 4 x weekly - 2 sets - 10 reps  - Shoulder extension with resistance - Neutral  - 4 x weekly - 2 sets - 10 reps   - Doorway Pec Stretch at 60 Degrees Abduction with Arm Straight  - 1 x daily - 7 x weekly - 3 sets - 3 reps - 30 sec hold  - Wall Clock with Theraband  - 4 x weekly - 3 sets - 5 rep  Charges: 1 neuro re ed 2 ther ex       Total Timed Treatment: 45 min  Total Treatment Time: 45 min

## 2024-12-02 ENCOUNTER — APPOINTMENT (OUTPATIENT)
Dept: PHYSICAL THERAPY | Age: 69
End: 2024-12-02
Attending: INTERNAL MEDICINE
Payer: MEDICARE

## 2024-12-05 ENCOUNTER — OFFICE VISIT (OUTPATIENT)
Dept: PHYSICAL THERAPY | Age: 69
End: 2024-12-05
Attending: INTERNAL MEDICINE
Payer: MEDICARE

## 2024-12-05 PROCEDURE — 97112 NEUROMUSCULAR REEDUCATION: CPT

## 2024-12-05 PROCEDURE — 97110 THERAPEUTIC EXERCISES: CPT

## 2024-12-05 NOTE — PROGRESS NOTES
Diagnosis:   Chronic pain of both shoulders (M25.511,G89.29,M25.512)  Impingement and bicipital tendonitis       Referring Provider: Grazyna Nice  Date of Evaluation:    10/8/24    Precautions:  None Next MD visit:   none scheduled  Date of Surgery: n/a   Insurance Primary/Secondary: MEDICARE / [employer] [place] INDEMNITY     # Auth Visits: Medical Necessity, progress notes every 10 visits            Subjective: Pt states her shoulders feel pretty good, knees are the most painful.     Pain: 4/10  knees       Objective:   All of the below objective measures are from evaluation for reference:  AROM: (* denotes performed with pain)  Shoulder    Flexion: R 145; L 154  Abduction: R 65*; L 85  ER: R 34*; L 55  IR: R 50*; L 65         Assessment Added t spine extensions over chair back to improve upper back mobility and facilitate greater flexion ROM. Improving strength noted, more reps completed before cueing required.    Goals:   (to be met in 10 visits)   Pt will report improved ability to sleep without waking due to shoulder pain   Pt will improve shoulder flexion AROM to >150 degrees to be able to reach into overhead cabinets without pain or restriction   Pt will improve shoulder abduction AROM to >110 degrees to improve ability to don deodorant, don/doff shirts, and wash hair   Pt will increase shoulder AROM ER to >45 to be able to reach and fasten seatbelt   Pt will increase shoulder AROM IR to >60 to be able to reach in back pocket, tuck in shirt, and turn steering wheel without pain  Pt will improve shoulder strength throughout to 4/5 to improve function with reaching overhead   Pt will demonstrate increased mid/low trap strength to 4/5 to promote improved shoulder mechanics and stabilization with lifting and reaching   Pt will be independent and compliant with comprehensive HEP to maintain progress achieved in PT     Plan: Progress t spine mobility and shoulder strength for improved postural control. Add t spine extension  over chair back next session.   Date: 11/14/24  Tx#: 6/10 Date: 11/22/24  TX#: 7/10 Date: 11/26/24  TX#: 8/10 Date: 12/5/24  TX#: 9/10   MANUAL: 13 min  STM R UT, levator, c spine paraspinals, rhomboids and bicep cross friction and effleurage 10 mins   Supine manual GH grade 3-4 posterior/inferior glide 3 mins  MANUAL: 13 min  STM R UT, levator, c spine paraspinals, rhomboids and bicep cross friction and effleurage 10 mins   Supine manual GH grade 3-4 posterior/inferior glide 3 mins  MANUAL: 10 min  STM R UT, levator, c spine paraspinals, rhomboids and bicep cross friction and effleurage 8 mins   Supine manual GH grade 3-4 posterior/inferior glide 2 mins  MANUAL: 10 min  STM R UT, levator, c spine paraspinals, rhomboids and bicep cross friction and effleurage 8 mins   Supine manual GH grade 3-4 posterior/inferior glide 2 mins    NEURO RE ED:  Chin tucks with scap set 3\" hold 15x  Standing yellow SB roll up with End range stretch 30 sec 3x after 10x2 flexion NEURO RE ED: 8 min  Chin tucks with scap set 3\" hold 15x  Standing yellow SB roll up with End range stretch 30 sec 3x after 10x2 flexion  SL scap clocks 10x2 NEURO RE ED: 8 min  Chin tucks with scap set 3\" hold 15x  Standing yellow SB roll up with End range stretch 30 sec 3x after 10x2 flexion  Yellow band wall clock 5x2 each arm  NEURO RE ED: 8 min  Chin tucks with scap set 3\" hold 15x  Standing yellow SB roll up with End range stretch 30 sec 3x after 10x2 flexion  Yellow band wall clock 8x each arm    THERE EX: 30  Nu step BUE and BLEs level 3 8 min  SL ER 10x3  BUE ER red band 15x2  Supine manual levator, UT stretch 30 sec 3x  Supine PROM R shoulder EOR stretching 10 mins  Supine manual pec stretch 30 sec 3x  Self stretch rhomboid and UT 30 sec 3x  Eccentric bicep curl 3# RUE seated 10x3  Red band row and shoulder ext 10x2  SB yellow AAROM FLEX 10x3 THERE EX:  24 min  Nu step BUE and BLEs level 3 8 min  SL ER 10x3 1#  BUE ER red band 15x2  Supine manual  levator, UT stretch 30 sec 3x  Supine PROM R shoulder EOR stretching 10 mins  Supine manual pec stretch 30 sec 3x  Eccentric bicep curl 3# RUE seated 10x3  Red band row and shoulder ext 10x2  SB yellow AAROM FLEX 10x3  Yellow SB low trap lift off 12x  Standing doorway pec stretch 30 sec 3x  Red band IR/ER 10x  THERE EX:  27 min  Nu step BUE and BLEs level 3 10 min  SL ER 10x3 1#  BUE ER red band 15x2  Supine manual levator, UT stretch 30 sec 3x  Supine PROM R shoulder EOR stretching 10 mins  Supine manual pec stretch 30 sec 3x  Eccentric bicep curl 3# RUE seated 10x3  Red band row and shoulder ext, scaption 10x2  SB yellow AAROM FLEX 10x3  Yellow SB low trap lift off 15x  Standing doorway pec stretch 30 sec 3x  Red band IR/ER 10x2 ea  THERE EX:  27 min  Nu step BUE and BLEs level 3 10 min  SL ER 10x3 1#  BUE ER red band 15x2  Supine manual levator, UT stretch 30 sec 3x  Supine PROM   R shoulder EOR stretching 10 mins  Supine manual pec stretch 30 sec 3x  Eccentric bicep curl 3# RUE seated 10x3  Red band row and shoulder ext, scaption 10x2  SB yellow AAROM FLEX 10x3  Yellow SB low trap lift off 15x  Standing doorway pec stretch 30 sec 3x  Red band IR/ER 10x2 ea  Red band lat pull down 10x2  T spine EXT 10x2 seated over chair back         HEP: Access Code: [email or web address]  URL: [email or web address]    Date: 10/08/2024  Prepared by: Rachelle Smith     Exercises  - Seated Scapular Retraction  - 1 x daily - 7 x weekly - 3 sets - 10 reps - 5 sec hold  - Doorway Pec Stretch at 60 Degrees Abduction with Arm Straight  - 1 x daily - 7 x weekly - 3 sets - 1 reps - 30 sec hold  - Doorway Pec Stretch at 60 Elevation  - 1 x daily - 7 x weekly - 3 sets - 2 reps - 30 sec hold  - Standing Lower Cervical and Upper Thoracic Stretch  - 1 x daily - 7 x weekly - 3 sets - 2 reps - 30 sec hold  - Seated Upper Trapezius Stretch  - 1 x daily - 7 x weekly - 3 sets - 10 reps  - TL Sidebending Stretch - Arms Crossed  - 1 x daily  - 7 x weekly - 3 sets - 2 reps - 30 sec hold  - Gentle Levator Scapulae Stretch  - 1 x daily - 7 x weekly - 3 sets - 2 reps - 30 sec hold  - Shoulder External Rotation and Scapular Retraction with Resistance  - 4 x weekly - 2 sets - 15 reps  - Standing Single Arm Eccentric Bicep Curl Pronated then Supinated  - 4 x weekly - 3 sets - 10 reps  - Standing Bilateral Low Shoulder Row with Anchored Resistance  - 4 x weekly - 2 sets - 10 reps  - Shoulder extension with resistance - Neutral  - 4 x weekly - 2 sets - 10 reps   - Doorway Pec Stretch at 60 Degrees Abduction with Arm Straight  - 1 x daily - 7 x weekly - 3 sets - 3 reps - 30 sec hold  - Wall Clock with Theraband  - 4 x weekly - 3 sets - 5 rep  Charges: 1 neuro re ed 2 ther ex       Total Timed Treatment: 45 min  Total Treatment Time: 45 min

## 2024-12-06 ENCOUNTER — TELEPHONE (OUTPATIENT)
Dept: PHYSICAL THERAPY | Facility: HOSPITAL | Age: 69
End: 2024-12-06

## 2024-12-09 ENCOUNTER — APPOINTMENT (OUTPATIENT)
Dept: PHYSICAL THERAPY | Age: 69
End: 2024-12-09
Attending: INTERNAL MEDICINE
Payer: MEDICARE

## 2024-12-12 ENCOUNTER — OFFICE VISIT (OUTPATIENT)
Dept: PHYSICAL THERAPY | Age: 69
End: 2024-12-12
Attending: INTERNAL MEDICINE
Payer: MEDICARE

## 2024-12-12 PROCEDURE — 97110 THERAPEUTIC EXERCISES: CPT

## 2024-12-12 PROCEDURE — 97112 NEUROMUSCULAR REEDUCATION: CPT

## 2024-12-12 NOTE — PROGRESS NOTES
Diagnosis:   Chronic pain of both shoulders (M25.511,G89.29,M25.512)  Impingement and bicipital tendonitis       Referring Provider: Grazyna Nice  Date of Evaluation:    10/8/24    Precautions:  None Next MD visit:   none scheduled  Date of Surgery: n/a   Insurance Primary/Secondary: MEDICARE / [employer] [place] INDEMNITY     # Auth Visits: Medical Necessity, progress notes every 10 visits           Discharge Summary  Pt has attended 10 visits in Physical Therapy.     Subjective: Pt states her shoulders feel good, knees even a little better and ready for d/c with HEP.     Pain: 3/10  knees       Objective:   12/12/24 AROM: (* denotes performed with pain)  Shoulder    Flexion: R 157; L 154  Abduction: R 143*; L 85  ER: R 68; L 55  IR: R 71*; L 65     All of the below objective measures are from evaluation for reference:  AROM: (* denotes performed with pain)  Shoulder    Flexion: R 145; L 154  Abduction: R 65*; L 85  ER: R 34*; L 55  IR: R 50*; L 65         Assessment   Pt to be d/c from skilled PT 2/2 meeting all goals. Pt has made gains in ROM and strength as evidenced by increased AROM R shoulder flexion from 145 to 157, ABD from 65 to  143, ER from 34 to 68 deg and IR from 50 to 71 deg. Pt has improved functional strength as evidenced by return to ADLs without restriction or compensation at shoulder and ability to sleep through the night. QuickDASH score improvement of 15% by discharge noted. Pt has HEP to maintain/progress strength, ROM  independently.       Goals:   (to be met in 10 visits)   Pt will report improved ability to sleep without waking due to shoulder pain 12/12/24- GOAL MET  Pt will improve shoulder flexion AROM to >150 degrees to be able to reach into overhead cabinets without pain or restriction 12/12/24- GOAL MET  Pt will improve shoulder abduction AROM to >110 degrees to improve ability to don deodorant, don/doff shirts, and wash hair 12/12/24- GOAL ME  Pt will increase shoulder AROM ER to >45 to be able  to reach and fasten seatbelt 12/12/24- GOAL MET  Pt will increase shoulder AROM IR to >60 to be able to reach in back pocket, tuck in shirt, and turn steering wheel without pain  12/12/24- GOAL MET  Pt will improve shoulder strength throughout to 4/5 to improve function with reaching overhead  12/12/24- GOAL MET  Pt will demonstrate increased mid/low trap strength to 4/5 to promote improved shoulder mechanics and stabilization with lifting and reaching 12/12/24- GOAL MET  Pt will be independent and compliant with comprehensive HEP to maintain progress achieved in PT 12/12/24- GOAL MET    Plan: D/C.   Date: 11/14/24  Tx#: 6/10 Date: 11/22/24  TX#: 7/10 Date: 11/26/24  TX#: 8/10 Date: 12/5/24  TX#: 9/10 Date: 12/12/24  TX#: 10/10   MANUAL: 13 min  STM R UT, levator, c spine paraspinals, rhomboids and bicep cross friction and effleurage 10 mins   Supine manual GH grade 3-4 posterior/inferior glide 3 mins  MANUAL: 13 min  STM R UT, levator, c spine paraspinals, rhomboids and bicep cross friction and effleurage 10 mins   Supine manual GH grade 3-4 posterior/inferior glide 3 mins  MANUAL: 10 min  STM R UT, levator, c spine paraspinals, rhomboids and bicep cross friction and effleurage 8 mins   Supine manual GH grade 3-4 posterior/inferior glide 2 mins  MANUAL: 10 min  STM R UT, levator, c spine paraspinals, rhomboids and bicep cross friction and effleurage 8 mins   Supine manual GH grade 3-4 posterior/inferior glide 2 mins  MANUAL: 10 min  STM R UT, levator, c spine paraspinals, rhomboids and bicep cross friction and effleurage 8 mins   Supine manual GH grade 3-4 posterior/inferior glide 2 mins    NEURO RE ED:  Chin tucks with scap set 3\" hold 15x  Standing yellow SB roll up with End range stretch 30 sec 3x after 10x2 flexion NEURO RE ED: 8 min  Chin tucks with scap set 3\" hold 15x  Standing yellow SB roll up with End range stretch 30 sec 3x after 10x2 flexion  SL scap clocks 10x2 NEURO RE ED: 8 min  Chin tucks with scap  set 3\" hold 15x  Standing yellow SB roll up with End range stretch 30 sec 3x after 10x2 flexion  Yellow band wall clock 5x2 each arm  NEURO RE ED: 8 min  Chin tucks with scap set 3\" hold 15x  Standing yellow SB roll up with End range stretch 30 sec 3x after 10x2 flexion  Yellow band wall clock 8x each arm  NEURO RE ED: 8 min  Chin tucks with scap set 3\" hold 15x  Standing yellow SB roll up with End range stretch 30 sec 3x after 10x2 flexion  Yellow band wall clock 8x each arm    THERE EX: 30  Nu step BUE and BLEs level 3 8 min  SL ER 10x3  BUE ER red band 15x2  Supine manual levator, UT stretch 30 sec 3x  Supine PROM R shoulder EOR stretching 10 mins  Supine manual pec stretch 30 sec 3x  Self stretch rhomboid and UT 30 sec 3x  Eccentric bicep curl 3# RUE seated 10x3  Red band row and shoulder ext 10x2  SB yellow AAROM FLEX 10x3 THERE EX:  24 min  Nu step BUE and BLEs level 3 8 min  SL ER 10x3 1#  BUE ER red band 15x2  Supine manual levator, UT stretch 30 sec 3x  Supine PROM R shoulder EOR stretching 10 mins  Supine manual pec stretch 30 sec 3x  Eccentric bicep curl 3# RUE seated 10x3  Red band row and shoulder ext 10x2  SB yellow AAROM FLEX 10x3  Yellow SB low trap lift off 12x  Standing doorway pec stretch 30 sec 3x  Red band IR/ER 10x  THERE EX:  27 min  Nu step BUE and BLEs level 3 10 min  SL ER 10x3 1#  BUE ER red band 15x2  Supine manual levator, UT stretch 30 sec 3x  Supine PROM R shoulder EOR stretching 10 mins  Supine manual pec stretch 30 sec 3x  Eccentric bicep curl 3# RUE seated 10x3  Red band row and shoulder ext, scaption 10x2  SB yellow AAROM FLEX 10x3  Yellow SB low trap lift off 15x  Standing doorway pec stretch 30 sec 3x  Red band IR/ER 10x2 ea  THERE EX:  27 min  Nu step BUE and BLEs level 3 10 min  SL ER 10x3 1#  BUE ER red band 15x2  Supine manual levator, UT stretch 30 sec 3x  Supine PROM   R shoulder EOR stretching 10 mins  Supine manual pec stretch 30 sec 3x  Eccentric bicep curl 3# RUE seated  10x3  Red band row and shoulder ext, scaption 10x2  SB yellow AAROM FLEX 10x3  Yellow SB low trap lift off 15x  Standing doorway pec stretch 30 sec 3x  Red band IR/ER 10x2 ea  Red band lat pull down 10x2  T spine EXT 10x2 seated over chair back THERE EX:  27 min  Nu step BUE and BLEs level 3 10 min  SL ER 10x3 1#  BUE ER red band 15x2  Supine manual levator, UT stretch 30 sec 3x  Supine PROM R shoulder EOR stretching 10 mins  Supine manual pec stretch 30 sec 3x  Eccentric bicep curl 3# RUE seated 10x3  Red band row and shoulder ext, scaption 10x2  SB yellow AAROM FLEX 10x3  Yellow SB low trap lift off 15x  Standing doorway pec stretch 30 sec 3x  Red band IR/ER 10x2 ea  Red band lat pull down 10x2  T spine EXT 10x2 seated over chair back          HEP: Access Code: [email or web address]  URL: [email or web address]    Date: 10/08/2024  Prepared by: Rachelle Smith     Exercises  - Seated Scapular Retraction  - 1 x daily - 7 x weekly - 3 sets - 10 reps - 5 sec hold  - Doorway Pec Stretch at 60 Degrees Abduction with Arm Straight  - 1 x daily - 7 x weekly - 3 sets - 1 reps - 30 sec hold  - Doorway Pec Stretch at 60 Elevation  - 1 x daily - 7 x weekly - 3 sets - 2 reps - 30 sec hold  - Standing Lower Cervical and Upper Thoracic Stretch  - 1 x daily - 7 x weekly - 3 sets - 2 reps - 30 sec hold  - Seated Upper Trapezius Stretch  - 1 x daily - 7 x weekly - 3 sets - 10 reps  - TL Sidebending Stretch - Arms Crossed  - 1 x daily - 7 x weekly - 3 sets - 2 reps - 30 sec hold  - Gentle Levator Scapulae Stretch  - 1 x daily - 7 x weekly - 3 sets - 2 reps - 30 sec hold  - Shoulder External Rotation and Scapular Retraction with Resistance  - 4 x weekly - 2 sets - 15 reps  - Standing Single Arm Eccentric Bicep Curl Pronated then Supinated  - 4 x weekly - 3 sets - 10 reps  - Standing Bilateral Low Shoulder Row with Anchored Resistance  - 4 x weekly - 2 sets - 10 reps  - Shoulder extension with resistance - Neutral  - 4 x weekly  - 2 sets - 10 reps   - Doorway Pec Stretch at 60 Degrees Abduction with Arm Straight  - 1 x daily - 7 x weekly - 3 sets - 3 reps - 30 sec hold  - Wall Clock with Theraband  - 4 x weekly - 3 sets - 5 rep  Charges: 1 neuro re ed 2 ther ex       Total Timed Treatment: 45 min  Total Treatment Time: 45 min  QuickDASH Outcome Score  Score: 22.73 % (10/7/2024  9:08 AM)    Post QuickDASH Outcome Score  Post Score: 6.82 % (12/12/2024 11:56 AM)    15.91 % improvement    Plan: Discontinue skilled Physical Therapy     Patient/Family/Caregiver was advised of these findings, precautions, and treatment options and has agreed to actively participate in planning and for this course of care.    Thank you for your referral. If you have any questions, please contact me at Dept: [phone].    Sincerely,  Electronically signed by therapist: Alie Smith PT, DPT    Physician's certification required:  No  Please co-sign or sign and return this letter via fax as soon as possible to [phone].   I certify the need for these services furnished under this plan of treatment and while under my care.    X___________________________________________________ Date____________________    Certification From: 12/12/2024  To:3/12/2025

## 2025-01-02 RX ORDER — METFORMIN HYDROCHLORIDE 750 MG/1
750 TABLET, EXTENDED RELEASE ORAL
Qty: 180 TABLET | Refills: 0 | Status: SHIPPED | OUTPATIENT
Start: 2025-01-02

## 2025-02-09 DIAGNOSIS — I10 ESSENTIAL HYPERTENSION WITH GOAL BLOOD PRESSURE LESS THAN 140/90: ICD-10-CM

## 2025-02-09 DIAGNOSIS — E11.9 TYPE 2 DIABETES MELLITUS WITHOUT COMPLICATION, WITHOUT LONG-TERM CURRENT USE OF INSULIN (HCC): ICD-10-CM

## 2025-02-09 DIAGNOSIS — E78.00 HYPERCHOLESTEREMIA: ICD-10-CM

## 2025-02-10 RX ORDER — LISINOPRIL 20 MG/1
20 TABLET ORAL DAILY
Qty: 90 TABLET | Refills: 0 | Status: SHIPPED | OUTPATIENT
Start: 2025-02-10

## 2025-02-10 RX ORDER — GLIMEPIRIDE 2 MG/1
4 TABLET ORAL
Qty: 180 TABLET | Refills: 0 | Status: SHIPPED | OUTPATIENT
Start: 2025-02-10

## 2025-02-10 RX ORDER — PIOGLITAZONE 30 MG/1
30 TABLET ORAL DAILY
Qty: 90 TABLET | Refills: 0 | Status: SHIPPED | OUTPATIENT
Start: 2025-02-10

## 2025-02-10 RX ORDER — ROSUVASTATIN CALCIUM 5 MG/1
5 TABLET, COATED ORAL NIGHTLY
Qty: 90 TABLET | Refills: 0 | Status: SHIPPED | OUTPATIENT
Start: 2025-02-10

## 2025-02-10 RX ORDER — BISOPROLOL FUMARATE AND HYDROCHLOROTHIAZIDE 10; 6.25 MG/1; MG/1
2 TABLET ORAL DAILY
Qty: 180 TABLET | Refills: 0 | Status: SHIPPED | OUTPATIENT
Start: 2025-02-10 | End: 2025-05-11

## 2025-02-10 NOTE — TELEPHONE ENCOUNTER
LOV: 24    LAST LAB:24    LAST RX  ROSUVASTATIN 5 MG Oral Tab 90 tablet 0 2024 --   Sig:   TAKE 1 TABLET(5 MG) BY MOUTH EVERY       Medication Quantity Refills Start End   PIOGLITAZONE 30 MG Oral Tab 90 tablet 0 2024 --   Sig:   TAKE 1 TABLET(30 MG) BY MOUTH DAILY       GLIMEPIRIDE 2 MG Oral Tab 180 tablet 0 2024 --   Sig:   TAKE 2 TABLETS(4 MG) BY MOUTH BEFORE       Medication Quantity Refills Start End   lisinopril 20 MG Oral Tab () 90 tablet 0 2024   Sig:   Take 1 tablet (20 mg total) by mouth daily.       Medication Quantity Refills Start End   BISOPROLOL-HYDROCHLOROTHIAZIDE 10-6.25 MG Oral Tab () 180 tablet 0 2024   Sig:   TAKE 2 TABLETS BY MOUTH DAILY         Next OV:No future appointments.       PROTOCOL    Cholesterol Medication Protocol [id number] 10:34 AM   Protocol Details ALT < 80    ALT resulted within past year    Lipid panel within past 12 months    In person appointment or virtual visit in the past 12 mos or appointment in next 3 mos    Medication is active on med list         Hypertension Medications Protocol [id number] 10:34 AM   Protocol Details CMP or BMP in past 12 months    Last BP reading less than 140/90    In person appointment or virtual visit in the past 12 mos or appointment in next 3 mos    EGFRCR or GFRNAA > 50    Medication is active on med list          Diabetes Medication Protocol [id number] 10:34 AM   Protocol Details Last A1C < 7.5 and within past 6 months    In person appointment or virtual visit in the past 6 mos or appointment in next 3 mos    Microalbumin procedure in past 12 months or taking ACE/ARB    EGFRCR or GFRNAA > 50    GFR in the past 12 months    Medication is active on med list

## 2025-03-07 DIAGNOSIS — E11.9 TYPE 2 DIABETES MELLITUS WITHOUT COMPLICATION, WITHOUT LONG-TERM CURRENT USE OF INSULIN (HCC): Primary | ICD-10-CM

## 2025-03-07 DIAGNOSIS — M17.0 PRIMARY OSTEOARTHRITIS OF BOTH KNEES: ICD-10-CM

## 2025-03-07 DIAGNOSIS — Z12.31 ENCOUNTER FOR SCREENING MAMMOGRAM FOR MALIGNANT NEOPLASM OF BREAST: ICD-10-CM

## 2025-03-07 NOTE — TELEPHONE ENCOUNTER
Faxed request for refill on Meloxicam 15 m    LOV 8-28-24    LAST LAB  8-27-24    LAST RX  2-9-25  #30    Next OV  No future appointments.

## 2025-03-08 RX ORDER — MELOXICAM 15 MG/1
15 TABLET ORAL DAILY
Qty: 90 TABLET | Refills: 0 | Status: SHIPPED | OUTPATIENT
Start: 2025-03-08 | End: 2025-06-05

## 2025-03-12 NOTE — TELEPHONE ENCOUNTER
Last Office Visit: 6/18/20  Last Refill: 8/26/20  Last Labs: A1C 6.6% 6/18/20, lipid 3/4/20, normal range
(4) no limitation

## 2025-03-13 ENCOUNTER — HOSPITAL ENCOUNTER (OUTPATIENT)
Dept: MAMMOGRAPHY | Age: 70
Discharge: HOME | End: 2025-03-13
Attending: INTERNAL MEDICINE
Payer: MEDICARE

## 2025-03-13 DIAGNOSIS — Z12.31 ENCOUNTER FOR SCREENING MAMMOGRAM FOR MALIGNANT NEOPLASM OF BREAST: ICD-10-CM

## 2025-03-13 PROCEDURE — 77067 SCR MAMMO BI INCL CAD: CPT | Performed by: INTERNAL MEDICINE

## 2025-03-13 PROCEDURE — 77063 BREAST TOMOSYNTHESIS BI: CPT | Performed by: INTERNAL MEDICINE

## 2025-05-09 DIAGNOSIS — E78.00 HYPERCHOLESTEREMIA: ICD-10-CM

## 2025-05-09 DIAGNOSIS — I10 ESSENTIAL HYPERTENSION WITH GOAL BLOOD PRESSURE LESS THAN 140/90: ICD-10-CM

## 2025-05-09 DIAGNOSIS — E11.9 TYPE 2 DIABETES MELLITUS WITHOUT COMPLICATION, WITHOUT LONG-TERM CURRENT USE OF INSULIN (HCC): ICD-10-CM

## 2025-05-10 RX ORDER — BISOPROLOL FUMARATE AND HYDROCHLOROTHIAZIDE 10; 6.25 MG/1; MG/1
2 TABLET ORAL DAILY
Qty: 180 TABLET | Refills: 0 | Status: SHIPPED | OUTPATIENT
Start: 2025-05-10 | End: 2025-08-06

## 2025-05-10 RX ORDER — LISINOPRIL 20 MG/1
20 TABLET ORAL DAILY
Qty: 90 TABLET | Refills: 0 | Status: SHIPPED | OUTPATIENT
Start: 2025-05-10 | End: 2025-08-06

## 2025-05-10 RX ORDER — ROSUVASTATIN CALCIUM 5 MG/1
5 TABLET, COATED ORAL NIGHTLY
Qty: 90 TABLET | Refills: 0 | Status: SHIPPED | OUTPATIENT
Start: 2025-05-10

## 2025-05-11 RX ORDER — PIOGLITAZONE 30 MG/1
30 TABLET ORAL DAILY
Qty: 90 TABLET | Refills: 0 | Status: SHIPPED | OUTPATIENT
Start: 2025-05-11

## 2025-05-11 RX ORDER — GLIMEPIRIDE 2 MG/1
4 TABLET ORAL
Qty: 180 TABLET | Refills: 0 | Status: SHIPPED | OUTPATIENT
Start: 2025-05-11 | End: 2025-08-06

## 2025-05-15 ENCOUNTER — OFFICE VISIT (OUTPATIENT)
Dept: FAMILY MEDICINE CLINIC | Facility: CLINIC | Age: 70
End: 2025-05-15
Payer: MEDICARE

## 2025-05-15 ENCOUNTER — LABORATORY ENCOUNTER (OUTPATIENT)
Dept: LAB | Age: 70
End: 2025-05-15
Attending: INTERNAL MEDICINE
Payer: MEDICARE

## 2025-05-15 VITALS
BODY MASS INDEX: 47 KG/M2 | RESPIRATION RATE: 18 BRPM | DIASTOLIC BLOOD PRESSURE: 74 MMHG | SYSTOLIC BLOOD PRESSURE: 136 MMHG | HEART RATE: 58 BPM | TEMPERATURE: 98 F | OXYGEN SATURATION: 96 % | WEIGHT: 250.25 LBS

## 2025-05-15 DIAGNOSIS — I10 ESSENTIAL HYPERTENSION: ICD-10-CM

## 2025-05-15 DIAGNOSIS — E78.00 HYPERCHOLESTEREMIA: ICD-10-CM

## 2025-05-15 DIAGNOSIS — E11.9 TYPE 2 DIABETES MELLITUS WITHOUT COMPLICATION, WITHOUT LONG-TERM CURRENT USE OF INSULIN (HCC): ICD-10-CM

## 2025-05-15 DIAGNOSIS — E66.01 MORBID OBESITY WITH BMI OF 45.0-49.9, ADULT (HCC): Primary | ICD-10-CM

## 2025-05-15 LAB
ALBUMIN SERPL-MCNC: 5.1 G/DL (ref 3.2–4.8)
ALBUMIN/GLOB SERPL: 1.8 {RATIO} (ref 1–2)
ALP LIVER SERPL-CCNC: 139 U/L (ref 55–142)
ALT SERPL-CCNC: 73 U/L (ref 10–49)
ANION GAP SERPL CALC-SCNC: 8 MMOL/L (ref 0–18)
AST SERPL-CCNC: 64 U/L (ref ?–34)
BILIRUB SERPL-MCNC: 0.5 MG/DL (ref 0.2–1.1)
BUN BLD-MCNC: 16 MG/DL (ref 9–23)
CALCIUM BLD-MCNC: 10 MG/DL (ref 8.7–10.6)
CHLORIDE SERPL-SCNC: 104 MMOL/L (ref 98–112)
CHOLEST SERPL-MCNC: 138 MG/DL (ref ?–200)
CO2 SERPL-SCNC: 30 MMOL/L (ref 21–32)
CREAT BLD-MCNC: 0.89 MG/DL (ref 0.55–1.02)
CREAT UR-SCNC: 76.1 MG/DL
EGFRCR SERPLBLD CKD-EPI 2021: 70 ML/MIN/1.73M2 (ref 60–?)
EST. AVERAGE GLUCOSE BLD GHB EST-MCNC: 123 MG/DL (ref 68–126)
FASTING PATIENT LIPID ANSWER: YES
FASTING STATUS PATIENT QL REPORTED: YES
GLOBULIN PLAS-MCNC: 2.9 G/DL (ref 2–3.5)
GLUCOSE BLD-MCNC: 105 MG/DL (ref 70–99)
HBA1C MFR BLD: 5.9 % (ref ?–5.7)
HDLC SERPL-MCNC: 76 MG/DL (ref 40–59)
LDLC SERPL CALC-MCNC: 40 MG/DL (ref ?–100)
MICROALBUMIN UR-MCNC: 1.1 MG/DL
MICROALBUMIN/CREAT 24H UR-RTO: 14.5 UG/MG (ref ?–30)
NONHDLC SERPL-MCNC: 62 MG/DL (ref ?–130)
OSMOLALITY SERPL CALC.SUM OF ELEC: 296 MOSM/KG (ref 275–295)
POTASSIUM SERPL-SCNC: 4.4 MMOL/L (ref 3.5–5.1)
PROT SERPL-MCNC: 8 G/DL (ref 5.7–8.2)
SODIUM SERPL-SCNC: 142 MMOL/L (ref 136–145)
TRIGL SERPL-MCNC: 128 MG/DL (ref 30–149)
VLDLC SERPL CALC-MCNC: 18 MG/DL (ref 0–30)

## 2025-05-15 PROCEDURE — 80061 LIPID PANEL: CPT

## 2025-05-15 PROCEDURE — 99214 OFFICE O/P EST MOD 30 MIN: CPT | Performed by: INTERNAL MEDICINE

## 2025-05-15 PROCEDURE — 82570 ASSAY OF URINE CREATININE: CPT

## 2025-05-15 PROCEDURE — 36415 COLL VENOUS BLD VENIPUNCTURE: CPT

## 2025-05-15 PROCEDURE — 80053 COMPREHEN METABOLIC PANEL: CPT

## 2025-05-15 PROCEDURE — 83036 HEMOGLOBIN GLYCOSYLATED A1C: CPT

## 2025-05-15 PROCEDURE — G2211 COMPLEX E/M VISIT ADD ON: HCPCS | Performed by: INTERNAL MEDICINE

## 2025-05-15 PROCEDURE — 82043 UR ALBUMIN QUANTITATIVE: CPT

## 2025-05-15 RX ORDER — DULAGLUTIDE 0.75 MG/.5ML
0.75 INJECTION, SOLUTION SUBCUTANEOUS WEEKLY
Qty: 2 ML | Refills: 0 | Status: SHIPPED | OUTPATIENT
Start: 2025-05-15 | End: 2025-06-14

## 2025-05-15 NOTE — PROGRESS NOTES
HPI:   Nicole Ruiz is a 69 year old female who presents for recheck of her diabetes. Patient’s FBS have been 120-160. Last visit with ophthalmologist was last year.  Pt has been checking her feet on a regular basis. Pt denies any tingling of the feet. Pt denies any issues with depression. Pt complains of weight gain and severe knee pain.  Gel injections did not work, she will get a steroid injection before she goes on vacation. We spoke extensively about a GLP1 for diabetes to help with disease modification and weight loss.     Wt Readings from Last 6 Encounters:   05/15/25 250 lb 4 oz (113.5 kg)   08/28/24 255 lb 4 oz (115.8 kg)   11/29/23 260 lb 2 oz (118 kg)   11/15/23 258 lb 8 oz (117.3 kg)   08/30/23 254 lb 4 oz (115.3 kg)   08/24/23 256 lb 6.4 oz (116.3 kg)     Body mass index is 47.28 kg/m².     Lab Results   Component Value Date    A1C 6.2 (H) 08/27/2024    A1C 6.1 (H) 08/29/2023    A1C 6.3 (H) 03/28/2023     Lab Results   Component Value Date    CHOLEST 153 08/27/2024    CHOLEST 135 08/29/2023    CHOLEST 140 03/28/2023     Lab Results   Component Value Date    HDL 71 (H) 08/27/2024    HDL 71 (H) 08/29/2023    HDL 76 (H) 03/28/2023     Lab Results   Component Value Date    LDL 64 08/27/2024    LDL 41 08/29/2023    LDL 45 03/28/2023     Lab Results   Component Value Date    TRIG 99 08/27/2024    TRIG 135 08/29/2023    TRIG 106 03/28/2023     Lab Results   Component Value Date    AST 19 08/27/2024    AST 19 08/29/2023    AST 22 03/28/2023     Lab Results   Component Value Date    ALT 21 08/27/2024    ALT 30 08/29/2023    ALT 35 03/28/2023     Malb/Cre Calc   Date Value Ref Range Status   08/27/2024 15.8 <=30.0 ug/mg Final     Comment:     <30 ug/mg creatinine       Normal     ug/mg creatinine   Microalbuminuria   >300 ug/mg creatinine      Albuminuria       08/30/2023 13.9 <=30.0 ug/mg Final     Comment:     <30 ug/mg creatinine       Normal     ug/mg creatinine   Microalbuminuria    >300 ug/mg creatinine      Albuminuria       08/25/2022 14.9 <=30.0 ug/mg Final     Comment:     <30 ug/mg creatinine       Normal     ug/mg creatinine   Microalbuminuria   >300 ug/mg creatinine      Albuminuria           Current Medications[1]   Past Medical History[2]   Past Surgical History[3]   Social History: Short Social Hx on File[4]  Exercise: none.  Diet: doesn't watch     REVIEW OF SYSTEMS:   GENERAL HEALTH: feels well otherwise  SKIN: denies any unusual skin lesions or rashes  RESPIRATORY: denies shortness of breath with exertion  CARDIOVASCULAR: denies chest pain on exertion  GI: denies abdominal pain and denies heartburn  NEURO: denies headaches    EXAM:   /74   Pulse 58   Temp 98.2 °F (36.8 °C) (Temporal)   Resp 18   Wt 250 lb 4 oz (113.5 kg)   SpO2 96%   BMI 47.28 kg/m²   GENERAL: well developed, well nourished,in no apparent distress  SKIN: no rashes,no suspicious lesions  NECK: supple,no adenopathy,no bruits  LUNGS: clear to auscultation  CARDIO: RRR without murmur  GI: good BS's,no masses, HSM or tenderness  EXTREMITIES: no cyanosis, clubbing or edema  Bilateral barefoot skin diabetic exam is normal, visualized feet and the appearance is normal.  Bilateral monofilament/sensation of both feet is normal.  Pulsation pedal pulse exam of both lower legs/feet is normal as well.  NEURO: sensation is intact to monofilament     ASSESSMENT AND PLAN:   Nicole Ruiz is a 69 year old female who presents for a recheck of her diabetes. Diabetic control is good, but needs weight reduction for knee replacement and DM diease modification.    Recommendations are: continue present meds, check HgbA1C, check fasting lipids and CMP, lose wgt with carbohydrate controlled diet and exercise, refer to Ophthamology, check feet daily.  The patient indicates understanding of these issues and agrees to the plan.         [1]   Current Outpatient Medications   Medication Sig Dispense Refill     PIOGLITAZONE 30 MG Oral Tab TAKE 1 TABLET(30 MG) BY MOUTH DAILY 90 tablet 0    GLIMEPIRIDE 2 MG Oral Tab TAKE 2 TABLETS(4 MG) BY MOUTH BEFORE BREAKFAST 180 tablet 0    ROSUVASTATIN 5 MG Oral Tab TAKE 1 TABLET(5 MG) BY MOUTH EVERY NIGHT 90 tablet 0    BISOPROLOL-HYDROCHLOROTHIAZIDE 10-6.25 MG Oral Tab TAKE 2 TABLETS BY MOUTH DAILY 180 tablet 0    LISINOPRIL 20 MG Oral Tab TAKE 1 TABLET(20 MG) BY MOUTH DAILY 90 tablet 0    Meloxicam 15 MG Oral Tab Take 1 tablet (15 mg total) by mouth daily. 90 tablet 0    metFORMIN  MG Oral Tablet 24 Hr Take 1 tablet (750 mg total) by mouth daily with breakfast. 180 tablet 0    latanoprost 0.005 % Ophthalmic Solution Place 1 drop into both eyes nightly.     [2]   Past Medical History:   Arthritis    Cataract    Diabetes mellitus (HCC)    Essential hypertension    Glaucoma    Glomerulonephritis    Hearing impairment    hearing aids bilateral    Hearing loss    High cholesterol    History of cholecystectomy    Pain in joints    Sleep apnea    CPAP    Sleep disturbance    sleep apena    Type II or unspecified type diabetes mellitus without mention of complication, not stated as uncontrolled        Visual impairment    cataract surgery hx   [3]   Past Surgical History:  Procedure Laterality Date    Adenoidectomy      Cholecystectomy      Colonoscopy N/A 10/25/2022    Procedure: COLONOSCOPY;  Surgeon: Lev Sawyer MD;  Location:  ENDOSCOPY          Tonsillectomy     [4]   Social History  Socioeconomic History    Marital status:    Tobacco Use    Smoking status: Never    Smokeless tobacco: Never   Vaping Use    Vaping status: Never Used   Substance and Sexual Activity    Alcohol use: Yes     Alcohol/week: 2.0 standard drinks of alcohol     Types: 1 Glasses of wine, 1 Standard drinks or equivalent per week     Comment: occasionally    Drug use: No   Social History Narrative    ** Merged History Encounter **

## 2025-06-05 DIAGNOSIS — M17.0 PRIMARY OSTEOARTHRITIS OF BOTH KNEES: ICD-10-CM

## 2025-06-05 RX ORDER — MELOXICAM 15 MG/1
15 TABLET ORAL DAILY
Qty: 90 TABLET | Refills: 0 | Status: SHIPPED | OUTPATIENT
Start: 2025-06-05

## 2025-06-05 NOTE — TELEPHONE ENCOUNTER
Non-Narcotic Pain Medication Protocol Passed 06/05/2025 10:43 AM   Protocol Details In person appointment or virtual visit in the past 6 mos or appointment in next 3 mos    Medication is active on med list          Last office visit:  05/15/25  Last refill:  03/08/24  #90, no refills     Future Appointments   Date Time Provider Department Center   8/4/2025 11:00 AM Grazyna Nice MD [facility] [place]

## 2025-06-09 ENCOUNTER — TELEPHONE (OUTPATIENT)
Dept: FAMILY MEDICINE CLINIC | Facility: CLINIC | Age: 70
End: 2025-06-09

## 2025-06-09 RX ORDER — DULAGLUTIDE 0.75 MG/.5ML
0.75 INJECTION, SOLUTION SUBCUTANEOUS WEEKLY
Qty: 2 ML | Refills: 0 | Status: SHIPPED | OUTPATIENT
Start: 2025-06-09 | End: 2025-07-09

## 2025-06-09 NOTE — TELEPHONE ENCOUNTER
Patient said that she took her last dose of Trulicity 0.75mg. Should she stay on the same dose or increase? She has lost 8 pounds since she started the medication. She is a pre-diabetic and does not check blood sugars at home, last Hgba1c was 5.9.

## 2025-06-09 NOTE — TELEPHONE ENCOUNTER
Okay to stay on same dose of Trulicity.  Be sure to incorporate daily aerobic activity to accelerate weight loss, avoid starches such as breads, pasta, potatoes etc.

## 2025-06-25 RX ORDER — METFORMIN HYDROCHLORIDE 750 MG/1
750 TABLET, EXTENDED RELEASE ORAL
Qty: 180 TABLET | Refills: 0 | Status: SHIPPED | OUTPATIENT
Start: 2025-06-25

## 2025-06-25 NOTE — TELEPHONE ENCOUNTER
Last refill: 01/02/25  Qty 180  W/ 0 refills  Last ov: 05/15/25  Last labs 05/15/25    Requested Prescriptions     Pending Prescriptions Disp Refills    METFORMIN  MG Oral Tablet 24 Hr [Pharmacy Med Name: METFORMIN ER 750MG 24HR TABS] 180 tablet 0     Sig: TAKE 1 TABLET BY MOUTH DAILY WITH BREAKFAST     Future Appointments   Date Time Provider Department Center   7/10/2025  8:45 AM [facility] [address]   8/4/2025 11:00 AM Grazyna Nice MD [facility] [place]

## 2025-07-08 ENCOUNTER — TELEPHONE (OUTPATIENT)
Dept: FAMILY MEDICINE CLINIC | Facility: CLINIC | Age: 70
End: 2025-07-08

## 2025-07-08 RX ORDER — DULAGLUTIDE 0.75 MG/.5ML
0.75 INJECTION, SOLUTION SUBCUTANEOUS WEEKLY
Qty: 2 ML | Refills: 0 | Status: SHIPPED | OUTPATIENT
Start: 2025-07-08 | End: 2025-07-10

## 2025-07-08 NOTE — TELEPHONE ENCOUNTER
Last OV and A1c on 5/15/25.  The 5/17/25 message/ lab result says to repeat labs and f/u OV 3 months later--in August.

## 2025-07-10 ENCOUNTER — HOSPITAL ENCOUNTER (OUTPATIENT)
Dept: ULTRASOUND IMAGING | Age: 70
Discharge: HOME | End: 2025-07-10
Attending: INTERNAL MEDICINE
Payer: MEDICARE

## 2025-07-10 DIAGNOSIS — R79.89 ELEVATED LFTS: ICD-10-CM

## 2025-07-10 PROCEDURE — 76700 US EXAM ABDOM COMPLETE: CPT | Performed by: INTERNAL MEDICINE

## 2025-07-26 ENCOUNTER — TELEPHONE (OUTPATIENT)
Dept: FAMILY MEDICINE CLINIC | Facility: CLINIC | Age: 70
End: 2025-07-26

## 2025-08-04 ENCOUNTER — OFFICE VISIT (OUTPATIENT)
Dept: FAMILY MEDICINE CLINIC | Facility: CLINIC | Age: 70
End: 2025-08-04

## 2025-08-04 ENCOUNTER — LABORATORY ENCOUNTER (OUTPATIENT)
Dept: LAB | Age: 70
End: 2025-08-04
Attending: INTERNAL MEDICINE

## 2025-08-04 VITALS
HEIGHT: 61 IN | WEIGHT: 243.25 LBS | TEMPERATURE: 98 F | SYSTOLIC BLOOD PRESSURE: 126 MMHG | HEART RATE: 58 BPM | OXYGEN SATURATION: 97 % | BODY MASS INDEX: 45.93 KG/M2 | RESPIRATION RATE: 18 BRPM | DIASTOLIC BLOOD PRESSURE: 74 MMHG

## 2025-08-04 DIAGNOSIS — M17.0 PRIMARY OSTEOARTHRITIS OF BOTH KNEES: ICD-10-CM

## 2025-08-04 DIAGNOSIS — G47.33 OBSTRUCTIVE SLEEP APNEA: ICD-10-CM

## 2025-08-04 DIAGNOSIS — E78.00 HYPERCHOLESTEREMIA: ICD-10-CM

## 2025-08-04 DIAGNOSIS — R53.83 OTHER FATIGUE: ICD-10-CM

## 2025-08-04 DIAGNOSIS — M25.512 CHRONIC PAIN OF BOTH SHOULDERS: ICD-10-CM

## 2025-08-04 DIAGNOSIS — Z00.00 ENCOUNTER FOR ANNUAL HEALTH EXAMINATION: ICD-10-CM

## 2025-08-04 DIAGNOSIS — E11.9 TYPE 2 DIABETES MELLITUS WITHOUT COMPLICATION, WITHOUT LONG-TERM CURRENT USE OF INSULIN (HCC): Primary | ICD-10-CM

## 2025-08-04 DIAGNOSIS — M25.511 CHRONIC PAIN OF BOTH SHOULDERS: ICD-10-CM

## 2025-08-04 DIAGNOSIS — I10 ESSENTIAL HYPERTENSION: ICD-10-CM

## 2025-08-04 DIAGNOSIS — E66.01 MORBID OBESITY WITH BMI OF 45.0-49.9, ADULT (HCC): ICD-10-CM

## 2025-08-04 DIAGNOSIS — Z12.31 ENCOUNTER FOR SCREENING MAMMOGRAM FOR MALIGNANT NEOPLASM OF BREAST: ICD-10-CM

## 2025-08-04 DIAGNOSIS — G89.29 CHRONIC PAIN OF BOTH SHOULDERS: ICD-10-CM

## 2025-08-04 LAB
ALBUMIN SERPL-MCNC: 4.5 G/DL (ref 3.2–4.8)
ALBUMIN/GLOB SERPL: 1.7 (ref 1–2)
ALP LIVER SERPL-CCNC: 63 U/L (ref 55–142)
ALT SERPL-CCNC: 31 U/L (ref 10–49)
ANION GAP SERPL CALC-SCNC: 7 MMOL/L (ref 0–18)
AST SERPL-CCNC: 25 U/L (ref ?–34)
BASOPHILS # BLD AUTO: 0.04 X10(3) UL (ref 0–0.2)
BASOPHILS NFR BLD AUTO: 0.6 %
BILIRUB SERPL-MCNC: 0.5 MG/DL (ref 0.2–1.1)
BUN BLD-MCNC: 20 MG/DL (ref 9–23)
CALCIUM BLD-MCNC: 10 MG/DL (ref 8.7–10.6)
CHLORIDE SERPL-SCNC: 103 MMOL/L (ref 98–112)
CO2 SERPL-SCNC: 33 MMOL/L (ref 21–32)
CREAT BLD-MCNC: 0.9 MG/DL (ref 0.55–1.02)
EGFRCR SERPLBLD CKD-EPI 2021: 69 ML/MIN/1.73M2 (ref 60–?)
EOSINOPHIL # BLD AUTO: 0.21 X10(3) UL (ref 0–0.7)
EOSINOPHIL NFR BLD AUTO: 3.3 %
ERYTHROCYTE [DISTWIDTH] IN BLOOD BY AUTOMATED COUNT: 13.7 %
FASTING STATUS PATIENT QL REPORTED: YES
GLOBULIN PLAS-MCNC: 2.7 G/DL (ref 2–3.5)
GLUCOSE BLD-MCNC: 111 MG/DL (ref 70–99)
HCT VFR BLD AUTO: 38.4 % (ref 35–48)
HGB BLD-MCNC: 12.1 G/DL (ref 12–16)
IMM GRANULOCYTES # BLD AUTO: 0.01 X10(3) UL (ref 0–1)
IMM GRANULOCYTES NFR BLD: 0.2 %
LYMPHOCYTES # BLD AUTO: 1.99 X10(3) UL (ref 1–4)
LYMPHOCYTES NFR BLD AUTO: 31.6 %
MCH RBC QN AUTO: 31.3 PG (ref 26–34)
MCHC RBC AUTO-ENTMCNC: 31.5 G/DL (ref 31–37)
MCV RBC AUTO: 99.2 FL (ref 80–100)
MONOCYTES # BLD AUTO: 0.54 X10(3) UL (ref 0.1–1)
MONOCYTES NFR BLD AUTO: 8.6 %
NEUTROPHILS # BLD AUTO: 3.51 X10 (3) UL (ref 1.5–7.7)
NEUTROPHILS # BLD AUTO: 3.51 X10(3) UL (ref 1.5–7.7)
NEUTROPHILS NFR BLD AUTO: 55.7 %
OSMOLALITY SERPL CALC.SUM OF ELEC: 299 MOSM/KG (ref 275–295)
PLATELET # BLD AUTO: 221 10(3)UL (ref 150–450)
POTASSIUM SERPL-SCNC: 4.4 MMOL/L (ref 3.5–5.1)
PROT SERPL-MCNC: 7.2 G/DL (ref 5.7–8.2)
RBC # BLD AUTO: 3.87 X10(6)UL (ref 3.8–5.3)
SODIUM SERPL-SCNC: 143 MMOL/L (ref 136–145)
T4 FREE SERPL-MCNC: 1.4 NG/DL (ref 0.8–1.7)
TSI SER-ACNC: 2.26 UIU/ML (ref 0.55–4.78)
WBC # BLD AUTO: 6.3 X10(3) UL (ref 4–11)

## 2025-08-04 PROCEDURE — 84443 ASSAY THYROID STIM HORMONE: CPT

## 2025-08-04 PROCEDURE — G0439 PPPS, SUBSEQ VISIT: HCPCS | Performed by: INTERNAL MEDICINE

## 2025-08-04 PROCEDURE — 36415 COLL VENOUS BLD VENIPUNCTURE: CPT

## 2025-08-04 PROCEDURE — 80053 COMPREHEN METABOLIC PANEL: CPT

## 2025-08-04 PROCEDURE — 85025 COMPLETE CBC W/AUTO DIFF WBC: CPT

## 2025-08-04 PROCEDURE — 84439 ASSAY OF FREE THYROXINE: CPT

## 2025-08-04 RX ORDER — DULAGLUTIDE 1.5 MG/.5ML
1.5 INJECTION, SOLUTION SUBCUTANEOUS WEEKLY
Qty: 2 ML | Refills: 0 | Status: SHIPPED | OUTPATIENT
Start: 2025-08-04 | End: 2025-08-04

## 2025-08-04 RX ORDER — DULAGLUTIDE 1.5 MG/.5ML
1.5 INJECTION, SOLUTION SUBCUTANEOUS WEEKLY
Qty: 2 ML | Refills: 0 | Status: SHIPPED | OUTPATIENT
Start: 2025-08-04 | End: 2025-08-05

## 2025-08-06 DIAGNOSIS — E78.00 HYPERCHOLESTEREMIA: ICD-10-CM

## 2025-08-06 DIAGNOSIS — E11.9 TYPE 2 DIABETES MELLITUS WITHOUT COMPLICATION, WITHOUT LONG-TERM CURRENT USE OF INSULIN (HCC): ICD-10-CM

## 2025-08-06 DIAGNOSIS — I10 ESSENTIAL HYPERTENSION WITH GOAL BLOOD PRESSURE LESS THAN 140/90: ICD-10-CM

## 2025-08-06 RX ORDER — BISOPROLOL FUMARATE AND HYDROCHLOROTHIAZIDE 10; 6.25 MG/1; MG/1
2 TABLET ORAL DAILY
Qty: 180 TABLET | Refills: 0 | Status: SHIPPED | OUTPATIENT
Start: 2025-08-06 | End: 2025-11-04

## 2025-08-06 RX ORDER — GLIMEPIRIDE 2 MG/1
4 TABLET ORAL
Qty: 180 TABLET | Refills: 0 | Status: SHIPPED | OUTPATIENT
Start: 2025-08-06

## 2025-08-06 RX ORDER — LISINOPRIL 20 MG/1
20 TABLET ORAL DAILY
Qty: 90 TABLET | Refills: 0 | Status: SHIPPED | OUTPATIENT
Start: 2025-08-06

## 2025-08-12 ENCOUNTER — TELEPHONE (OUTPATIENT)
Dept: FAMILY MEDICINE CLINIC | Facility: CLINIC | Age: 70
End: 2025-08-12

## (undated) DIAGNOSIS — E78.00 HYPERCHOLESTEREMIA: ICD-10-CM

## (undated) DIAGNOSIS — E11.9 TYPE 2 DIABETES MELLITUS WITHOUT COMPLICATION, WITHOUT LONG-TERM CURRENT USE OF INSULIN (HCC): ICD-10-CM

## (undated) DEVICE — 3M™ RED DOT™ MONITORING ELECTRODE WITH FOAM TAPE AND STICKY GEL, 50/BAG, 20/CASE, 72/PLT 2570: Brand: RED DOT™

## (undated) DEVICE — FILTERLINE NASAL ADULT O2/CO2

## (undated) DEVICE — ENDOSCOPY PACK - LOWER: Brand: MEDLINE INDUSTRIES, INC.

## (undated) DEVICE — Device: Brand: DEFENDO AIR/WATER/SUCTION AND BIOPSY VALVE

## (undated) DEVICE — 1200CC GUARDIAN II: Brand: GUARDIAN

## (undated) NOTE — MR AVS SNAPSHOT
[facility]  [address] [address]  [phone]               Thank you for choosing us for your health care visit with Delmer Grande MD.  We are glad to serve you and happy to provide you with this summary o Hemoglobin A1C [E]    Complete by:  Jan 17, 2017 (Approximate)    Assoc Dx:  Type 2 diabetes mellitus without complication, without long-term current use of insulin ([facility] [E11.9]           Comp Metabolic Panel (14) [E]    Complete by:  Jan 17, 2017 (Appro are inactive.      HOW TO GET STARTED: HOW TO STAY MOTIVATED:   Start activities slowly and build up over time Do what you like   Get your heart pumping – brisk walking, biking, swimming Even 10 minute increments are effective and add up over the week   2 ½